# Patient Record
Sex: FEMALE | Race: WHITE | NOT HISPANIC OR LATINO | Employment: OTHER | URBAN - METROPOLITAN AREA
[De-identification: names, ages, dates, MRNs, and addresses within clinical notes are randomized per-mention and may not be internally consistent; named-entity substitution may affect disease eponyms.]

---

## 2022-03-04 ENCOUNTER — HOSPITAL ENCOUNTER (EMERGENCY)
Facility: MEDICAL CENTER | Age: 67
End: 2022-03-04
Attending: EMERGENCY MEDICINE
Payer: MEDICARE

## 2022-03-04 VITALS
HEIGHT: 66 IN | OXYGEN SATURATION: 93 % | HEART RATE: 88 BPM | SYSTOLIC BLOOD PRESSURE: 136 MMHG | DIASTOLIC BLOOD PRESSURE: 64 MMHG | TEMPERATURE: 97 F | WEIGHT: 267 LBS | BODY MASS INDEX: 42.91 KG/M2 | RESPIRATION RATE: 19 BRPM

## 2022-03-04 DIAGNOSIS — K31.84 GASTROPARESIS: ICD-10-CM

## 2022-03-04 DIAGNOSIS — E86.0 DEHYDRATION: ICD-10-CM

## 2022-03-04 LAB
ALBUMIN SERPL BCP-MCNC: 3.9 G/DL (ref 3.2–4.9)
ALBUMIN/GLOB SERPL: 1.1 G/DL
ALP SERPL-CCNC: 81 U/L (ref 30–99)
ALT SERPL-CCNC: 20 U/L (ref 2–50)
ANION GAP SERPL CALC-SCNC: 14 MMOL/L (ref 7–16)
AST SERPL-CCNC: 16 U/L (ref 12–45)
BASOPHILS # BLD AUTO: 0.4 % (ref 0–1.8)
BASOPHILS # BLD: 0.03 K/UL (ref 0–0.12)
BILIRUB SERPL-MCNC: 0.7 MG/DL (ref 0.1–1.5)
BUN SERPL-MCNC: 29 MG/DL (ref 8–22)
CALCIUM SERPL-MCNC: 9.3 MG/DL (ref 8.5–10.5)
CHLORIDE SERPL-SCNC: 108 MMOL/L (ref 96–112)
CO2 SERPL-SCNC: 19 MMOL/L (ref 20–33)
CREAT SERPL-MCNC: 1.29 MG/DL (ref 0.5–1.4)
EOSINOPHIL # BLD AUTO: 0.07 K/UL (ref 0–0.51)
EOSINOPHIL NFR BLD: 0.8 % (ref 0–6.9)
ERYTHROCYTE [DISTWIDTH] IN BLOOD BY AUTOMATED COUNT: 47.5 FL (ref 35.9–50)
GLOBULIN SER CALC-MCNC: 3.4 G/DL (ref 1.9–3.5)
GLUCOSE SERPL-MCNC: 201 MG/DL (ref 65–99)
HCT VFR BLD AUTO: 44.9 % (ref 37–47)
HGB BLD-MCNC: 14.4 G/DL (ref 12–16)
IMM GRANULOCYTES # BLD AUTO: 0.02 K/UL (ref 0–0.11)
IMM GRANULOCYTES NFR BLD AUTO: 0.2 % (ref 0–0.9)
LIPASE SERPL-CCNC: 6 U/L (ref 11–82)
LYMPHOCYTES # BLD AUTO: 1.35 K/UL (ref 1–4.8)
LYMPHOCYTES NFR BLD: 15.8 % (ref 22–41)
MCH RBC QN AUTO: 28.7 PG (ref 27–33)
MCHC RBC AUTO-ENTMCNC: 32.1 G/DL (ref 33.6–35)
MCV RBC AUTO: 89.6 FL (ref 81.4–97.8)
MONOCYTES # BLD AUTO: 0.64 K/UL (ref 0–0.85)
MONOCYTES NFR BLD AUTO: 7.5 % (ref 0–13.4)
NEUTROPHILS # BLD AUTO: 6.44 K/UL (ref 2–7.15)
NEUTROPHILS NFR BLD: 75.3 % (ref 44–72)
NRBC # BLD AUTO: 0 K/UL
NRBC BLD-RTO: 0 /100 WBC
PLATELET # BLD AUTO: 344 K/UL (ref 164–446)
PMV BLD AUTO: 10.2 FL (ref 9–12.9)
POTASSIUM SERPL-SCNC: 4.7 MMOL/L (ref 3.6–5.5)
PROT SERPL-MCNC: 7.3 G/DL (ref 6–8.2)
RBC # BLD AUTO: 5.01 M/UL (ref 4.2–5.4)
SODIUM SERPL-SCNC: 141 MMOL/L (ref 135–145)
WBC # BLD AUTO: 8.6 K/UL (ref 4.8–10.8)

## 2022-03-04 PROCEDURE — 700111 HCHG RX REV CODE 636 W/ 250 OVERRIDE (IP): Performed by: EMERGENCY MEDICINE

## 2022-03-04 PROCEDURE — 83690 ASSAY OF LIPASE: CPT

## 2022-03-04 PROCEDURE — 96376 TX/PRO/DX INJ SAME DRUG ADON: CPT

## 2022-03-04 PROCEDURE — 96374 THER/PROPH/DIAG INJ IV PUSH: CPT

## 2022-03-04 PROCEDURE — 80053 COMPREHEN METABOLIC PANEL: CPT

## 2022-03-04 PROCEDURE — 85025 COMPLETE CBC W/AUTO DIFF WBC: CPT

## 2022-03-04 PROCEDURE — 99284 EMERGENCY DEPT VISIT MOD MDM: CPT

## 2022-03-04 PROCEDURE — 36415 COLL VENOUS BLD VENIPUNCTURE: CPT

## 2022-03-04 PROCEDURE — 96375 TX/PRO/DX INJ NEW DRUG ADDON: CPT

## 2022-03-04 RX ORDER — METOCLOPRAMIDE 10 MG/1
10 TABLET ORAL 4 TIMES DAILY PRN
Qty: 30 TABLET | Refills: 3 | Status: ON HOLD | OUTPATIENT
Start: 2022-03-04 | End: 2022-06-18

## 2022-03-04 RX ORDER — DIPHENHYDRAMINE HYDROCHLORIDE 50 MG/ML
25 INJECTION INTRAMUSCULAR; INTRAVENOUS ONCE
Status: COMPLETED | OUTPATIENT
Start: 2022-03-04 | End: 2022-03-04

## 2022-03-04 RX ORDER — HALOPERIDOL 5 MG/ML
5 INJECTION INTRAMUSCULAR ONCE
Status: COMPLETED | OUTPATIENT
Start: 2022-03-04 | End: 2022-03-04

## 2022-03-04 RX ORDER — DIPHENHYDRAMINE HYDROCHLORIDE 50 MG/ML
50 INJECTION INTRAMUSCULAR; INTRAVENOUS ONCE
Status: COMPLETED | OUTPATIENT
Start: 2022-03-04 | End: 2022-03-04

## 2022-03-04 RX ORDER — OMEPRAZOLE 40 MG/1
40 CAPSULE, DELAYED RELEASE ORAL DAILY
Qty: 30 CAPSULE | Refills: 3 | Status: ON HOLD | OUTPATIENT
Start: 2022-03-04 | End: 2022-06-18

## 2022-03-04 RX ADMIN — DIPHENHYDRAMINE HYDROCHLORIDE 50 MG: 50 INJECTION INTRAMUSCULAR; INTRAVENOUS at 14:14

## 2022-03-04 RX ADMIN — DIPHENHYDRAMINE HYDROCHLORIDE 25 MG: 50 INJECTION INTRAMUSCULAR; INTRAVENOUS at 13:18

## 2022-03-04 RX ADMIN — HALOPERIDOL LACTATE 5 MG: 5 INJECTION, SOLUTION INTRAMUSCULAR at 13:19

## 2022-03-04 ASSESSMENT — PAIN DESCRIPTION - DESCRIPTORS: DESCRIPTORS: CRAMPING;SHARP

## 2022-03-04 NOTE — ED TRIAGE NOTES
.  Chief Complaint   Patient presents with   • Abdominal Pain     X 2-3 days upper abdomen    • Nausea   • Diarrhea     Denies blood in stool   pt to triage with s/o. Above c/c .

## 2022-03-04 NOTE — DISCHARGE INSTRUCTIONS
There is a good chance that the marijuana your smoking is contributing to your pain and your vomiting.  This is a syndrome called cannabinoid hyperemesis syndrome.  The only way to be sure that it is not the contributing factor is to abstain completely, meaning do not smoke at all for the next 6 months.  If your symptoms persist despite this then it may be developing gastroparesis.  I have given you follow-up information for our gastroenterologist.  Otherwise please follow-up with your primary care physician

## 2022-03-04 NOTE — ED PROVIDER NOTES
ED Provider Note    CHIEF COMPLAINT  Chief Complaint   Patient presents with   • Abdominal Pain     X 2-3 days upper abdomen    • Nausea   • Diarrhea     Denies blood in stool       HPI  Yaima Willis is a 66 y.o. female who presents with ongoing longstanding epigastric and left upper quadrant abdominal pain.  Patient reports she has had these symptoms for over years and years.  She reports that she takes Pepcid occasionally and smokes marijuana for her symptoms.  She reports they are ongoing.  Patient does not take a antacid daily.  Patient reports that when she vomits it is very loud and painful.  She reports that it is typically dry heaving or minimal fluids.  She denies any hematemesis or bilious emesis.  Patient reports that she has had diarrhea for around the last week.  Patient denies any bloody or black stool.  Patient denies any fevers or chills.  Patient is status post cholecystectomy and appendectomy years ago.  Patient denies any associated chest pain.  She denies any associated shortness of breath.  She denies any association of the pain with exertion.  Patient reports symptoms are identical to prior episodes of this.  She continues to smoke marijuana at least 4 times a day.  Patient has longstanding history of diabetes.  Patient reports that Reglan was actually very helpful for her symptoms but she ran out a while back.    REVIEW OF SYSTEMS  ROS    See HPI for further details. All other systems are negative.     PAST MEDICAL HISTORY   has a past medical history of Diabetes, Hypertension, and Seizure disorder (HCC).    SOCIAL HISTORY  Social History     Tobacco Use   • Smoking status: Current Every Day Smoker   • Smokeless tobacco: Not on file   Substance and Sexual Activity   • Alcohol use: Yes   • Drug use: No   • Sexual activity: Not on file       SURGICAL HISTORY  patient denies any surgical history    CURRENT MEDICATIONS  Home Medications     Reviewed by Pearl Dela Cruz R.N. (Registered Nurse)  "on 03/04/22 at 1118  Med List Status: Partial   Medication Last Dose Status   hydrocodone-acetaminophen (VICODIN) 5-500 MG TABS  Active                ALLERGIES  Allergies   Allergen Reactions   • Other Drug      Pt states she is allergic to \"ALL\" -javad like lidocaine       PHYSICAL EXAM  Vitals:    03/04/22 1112   BP: 110/74   Pulse: 91   Resp: 16   Temp: 36.1 °C (97 °F)   SpO2: 91%       Physical Exam  Constitutional:       Appearance: She is well-developed.   HENT:      Head: Normocephalic and atraumatic.   Eyes:      Conjunctiva/sclera: Conjunctivae normal.   Cardiovascular:      Rate and Rhythm: Normal rate and regular rhythm.   Pulmonary:      Effort: Pulmonary effort is normal.      Breath sounds: Normal breath sounds.   Abdominal:      General: Bowel sounds are normal. There is no distension.      Palpations: Abdomen is soft.      Tenderness: There is abdominal tenderness in the epigastric area and left upper quadrant. There is no rebound.   Musculoskeletal:      Cervical back: Normal range of motion and neck supple.   Skin:     General: Skin is warm and dry.      Findings: No rash.   Neurological:      Mental Status: She is alert and oriented to person, place, and time.   Psychiatric:         Behavior: Behavior normal.           DIAGNOSTIC STUDIES / PROCEDURES      LABS  Results for orders placed or performed during the hospital encounter of 03/04/22   CBC WITH DIFFERENTIAL   Result Value Ref Range    WBC 8.6 4.8 - 10.8 K/uL    RBC 5.01 4.20 - 5.40 M/uL    Hemoglobin 14.4 12.0 - 16.0 g/dL    Hematocrit 44.9 37.0 - 47.0 %    MCV 89.6 81.4 - 97.8 fL    MCH 28.7 27.0 - 33.0 pg    MCHC 32.1 (L) 33.6 - 35.0 g/dL    RDW 47.5 35.9 - 50.0 fL    Platelet Count 344 164 - 446 K/uL    MPV 10.2 9.0 - 12.9 fL    Neutrophils-Polys 75.30 (H) 44.00 - 72.00 %    Lymphocytes 15.80 (L) 22.00 - 41.00 %    Monocytes 7.50 0.00 - 13.40 %    Eosinophils 0.80 0.00 - 6.90 %    Basophils 0.40 0.00 - 1.80 %    Immature Granulocytes " 0.20 0.00 - 0.90 %    Nucleated RBC 0.00 /100 WBC    Neutrophils (Absolute) 6.44 2.00 - 7.15 K/uL    Lymphs (Absolute) 1.35 1.00 - 4.80 K/uL    Monos (Absolute) 0.64 0.00 - 0.85 K/uL    Eos (Absolute) 0.07 0.00 - 0.51 K/uL    Baso (Absolute) 0.03 0.00 - 0.12 K/uL    Immature Granulocytes (abs) 0.02 0.00 - 0.11 K/uL    NRBC (Absolute) 0.00 K/uL   COMP METABOLIC PANEL   Result Value Ref Range    Sodium 141 135 - 145 mmol/L    Potassium 4.7 3.6 - 5.5 mmol/L    Chloride 108 96 - 112 mmol/L    Co2 19 (L) 20 - 33 mmol/L    Anion Gap 14.0 7.0 - 16.0    Glucose 201 (H) 65 - 99 mg/dL    Bun 29 (H) 8 - 22 mg/dL    Creatinine 1.29 0.50 - 1.40 mg/dL    Calcium 9.3 8.5 - 10.5 mg/dL    AST(SGOT) 16 12 - 45 U/L    ALT(SGPT) 20 2 - 50 U/L    Alkaline Phosphatase 81 30 - 99 U/L    Total Bilirubin 0.7 0.1 - 1.5 mg/dL    Albumin 3.9 3.2 - 4.9 g/dL    Total Protein 7.3 6.0 - 8.2 g/dL    Globulin 3.4 1.9 - 3.5 g/dL    A-G Ratio 1.1 g/dL   LIPASE   Result Value Ref Range    Lipase 6 (L) 11 - 82 U/L   ESTIMATED GFR   Result Value Ref Range    GFR If African American 50 (A) >60 mL/min/1.73 m 2    GFR If Non  41 (A) >60 mL/min/1.73 m 2         RADIOLOGY  No orders to display           COURSE & MEDICAL DECISION MAKING  Pertinent Labs & Imaging studies reviewed. (See chart for details)    Patient with symptoms most consistent with gastritis versus pancreatitis versus gastroparesis versus cannabinoid hyperemesis syndrome.  Patient without any cardiopulmonary symptoms.  Patient will have basic labs for further risk stratification.  Given my high suspicion of gastroparesis versus cannabinoid hyperemesis syndrome I have consented patient for Haldol and Benadryl for symptom management.  She reports she has had this in the past and this did help her symptoms significantly.  Patient reports she is very anxious as her  is also here in the emergency department being seen for hernia.  Patient is otherwise well-appearing with  reassuring vitals.  Patient feeling improved following Haldol but she does have some mild akathisia.  Her Benadryl has been repeated which is resolved her symptoms.  Have stressed the importance of her abstaining from marijuana.  I have given her prescription for Reglan.  Patient has been able to eat and drink here in the emergency department.  Patient also prescribed omeprazole.  I have placed a referral for gastroenterology and the ED follow-up order so patient can get a primary care physician for ongoing management.       The patient will return for worsening symptoms and is stable at the time of discharge. The patient verbalizes understanding and will comply.    FINAL IMPRESSION    1. Dehydration    2. Gastroparesis               Electronically signed by: Haroon King M.D., 3/4/2022 1:03 PM

## 2022-06-11 ENCOUNTER — APPOINTMENT (OUTPATIENT)
Dept: CARDIOLOGY | Facility: MEDICAL CENTER | Age: 67
DRG: 637 | End: 2022-06-11
Attending: INTERNAL MEDICINE
Payer: MEDICARE

## 2022-06-11 ENCOUNTER — APPOINTMENT (OUTPATIENT)
Dept: RADIOLOGY | Facility: MEDICAL CENTER | Age: 67
DRG: 637 | End: 2022-06-11
Attending: INTERNAL MEDICINE
Payer: MEDICARE

## 2022-06-11 ENCOUNTER — HOSPITAL ENCOUNTER (INPATIENT)
Facility: MEDICAL CENTER | Age: 67
LOS: 7 days | DRG: 637 | End: 2022-06-18
Attending: INTERNAL MEDICINE | Admitting: INTERNAL MEDICINE
Payer: MEDICARE

## 2022-06-11 DIAGNOSIS — E10.10 DKA, TYPE 1, NOT AT GOAL (HCC): ICD-10-CM

## 2022-06-11 DIAGNOSIS — I10 HYPERTENSION, UNSPECIFIED TYPE: ICD-10-CM

## 2022-06-11 DIAGNOSIS — K29.71 GASTRITIS WITH HEMORRHAGE, UNSPECIFIED CHRONICITY, UNSPECIFIED GASTRITIS TYPE: ICD-10-CM

## 2022-06-11 DIAGNOSIS — I95.9 HYPOTENSION, UNSPECIFIED HYPOTENSION TYPE: ICD-10-CM

## 2022-06-11 DIAGNOSIS — N17.9 ACUTE RENAL FAILURE, UNSPECIFIED ACUTE RENAL FAILURE TYPE (HCC): ICD-10-CM

## 2022-06-11 DIAGNOSIS — R53.1 WEAKNESS: ICD-10-CM

## 2022-06-11 PROBLEM — E11.10 DKA (DIABETIC KETOACIDOSIS) (HCC): Status: ACTIVE | Noted: 2022-06-11

## 2022-06-11 PROBLEM — K92.0 HEMATEMESIS: Status: ACTIVE | Noted: 2022-06-11

## 2022-06-11 PROBLEM — A41.9 SEPSIS (HCC): Status: ACTIVE | Noted: 2022-06-11

## 2022-06-11 PROBLEM — R07.9 CHEST PAIN: Status: ACTIVE | Noted: 2022-06-11

## 2022-06-11 LAB
ABO + RH BLD: NORMAL
ABO GROUP BLD: NORMAL
ALBUMIN SERPL BCP-MCNC: 2.9 G/DL (ref 3.2–4.9)
ALBUMIN/GLOB SERPL: 1 G/DL
ALP SERPL-CCNC: 102 U/L (ref 30–99)
ALT SERPL-CCNC: 8 U/L (ref 2–50)
ANION GAP SERPL CALC-SCNC: 12 MMOL/L (ref 7–16)
ANION GAP SERPL CALC-SCNC: 17 MMOL/L (ref 7–16)
ANION GAP SERPL CALC-SCNC: 9 MMOL/L (ref 7–16)
APPEARANCE UR: CLEAR
AST SERPL-CCNC: 21 U/L (ref 12–45)
BACTERIA #/AREA URNS HPF: ABNORMAL /HPF
BASE EXCESS BLDA CALC-SCNC: -1 MMOL/L (ref -4–3)
BASE EXCESS BLDV CALC-SCNC: -2 MMOL/L
BASOPHILS # BLD AUTO: 0.1 % (ref 0–1.8)
BASOPHILS # BLD AUTO: 0.3 % (ref 0–1.8)
BASOPHILS # BLD: 0.03 K/UL (ref 0–0.12)
BASOPHILS # BLD: 0.09 K/UL (ref 0–0.12)
BILIRUB SERPL-MCNC: 0.3 MG/DL (ref 0.1–1.5)
BILIRUB UR QL STRIP.AUTO: ABNORMAL
BLD GP AB SCN SERPL QL: NORMAL
BODY TEMPERATURE: 96.8 CENTIGRADE
BODY TEMPERATURE: ABNORMAL DEGREES
BUN SERPL-MCNC: 60 MG/DL (ref 8–22)
BUN SERPL-MCNC: 65 MG/DL (ref 8–22)
BUN SERPL-MCNC: 74 MG/DL (ref 8–22)
CALCIUM SERPL-MCNC: 8.1 MG/DL (ref 8.4–10.2)
CALCIUM SERPL-MCNC: 8.1 MG/DL (ref 8.4–10.2)
CALCIUM SERPL-MCNC: 8.2 MG/DL (ref 8.4–10.2)
CHLORIDE SERPL-SCNC: 103 MMOL/L (ref 96–112)
CHLORIDE SERPL-SCNC: 104 MMOL/L (ref 96–112)
CHLORIDE SERPL-SCNC: 99 MMOL/L (ref 96–112)
CHOLEST SERPL-MCNC: 101 MG/DL (ref 100–199)
CO2 BLDA-SCNC: 26 MMOL/L (ref 20–33)
CO2 SERPL-SCNC: 19 MMOL/L (ref 20–33)
CO2 SERPL-SCNC: 22 MMOL/L (ref 20–33)
CO2 SERPL-SCNC: 24 MMOL/L (ref 20–33)
COLOR UR: YELLOW
CREAT SERPL-MCNC: 1.61 MG/DL (ref 0.5–1.4)
CREAT SERPL-MCNC: 1.69 MG/DL (ref 0.5–1.4)
CREAT SERPL-MCNC: 1.99 MG/DL (ref 0.5–1.4)
EKG IMPRESSION: NORMAL
EOSINOPHIL # BLD AUTO: 0 K/UL (ref 0–0.51)
EOSINOPHIL # BLD AUTO: 0.03 K/UL (ref 0–0.51)
EOSINOPHIL NFR BLD: 0 % (ref 0–6.9)
EOSINOPHIL NFR BLD: 0.1 % (ref 0–6.9)
ERYTHROCYTE [DISTWIDTH] IN BLOOD BY AUTOMATED COUNT: 43.8 FL (ref 35.9–50)
ERYTHROCYTE [DISTWIDTH] IN BLOOD BY AUTOMATED COUNT: 46.3 FL (ref 35.9–50)
FLUAV RNA SPEC QL NAA+PROBE: NEGATIVE
FLUBV RNA SPEC QL NAA+PROBE: NEGATIVE
GFR SERPLBLD CREATININE-BSD FMLA CKD-EPI: 27 ML/MIN/1.73 M 2
GFR SERPLBLD CREATININE-BSD FMLA CKD-EPI: 33 ML/MIN/1.73 M 2
GFR SERPLBLD CREATININE-BSD FMLA CKD-EPI: 35 ML/MIN/1.73 M 2
GLOBULIN SER CALC-MCNC: 3 G/DL (ref 1.9–3.5)
GLUCOSE BLD STRIP.AUTO-MCNC: 173 MG/DL (ref 65–99)
GLUCOSE BLD STRIP.AUTO-MCNC: 179 MG/DL (ref 65–99)
GLUCOSE BLD STRIP.AUTO-MCNC: 179 MG/DL (ref 65–99)
GLUCOSE BLD STRIP.AUTO-MCNC: 210 MG/DL (ref 65–99)
GLUCOSE BLD STRIP.AUTO-MCNC: 263 MG/DL (ref 65–99)
GLUCOSE BLD STRIP.AUTO-MCNC: 300 MG/DL (ref 65–99)
GLUCOSE BLD STRIP.AUTO-MCNC: 334 MG/DL (ref 65–99)
GLUCOSE BLD STRIP.AUTO-MCNC: 375 MG/DL (ref 65–99)
GLUCOSE BLD STRIP.AUTO-MCNC: 380 MG/DL (ref 65–99)
GLUCOSE BLD STRIP.AUTO-MCNC: 384 MG/DL (ref 65–99)
GLUCOSE SERPL-MCNC: 195 MG/DL (ref 65–99)
GLUCOSE SERPL-MCNC: 243 MG/DL (ref 65–99)
GLUCOSE SERPL-MCNC: 410 MG/DL (ref 65–99)
GLUCOSE UR STRIP.AUTO-MCNC: >=1000 MG/DL
HCO3 BLDA-SCNC: 25.1 MMOL/L (ref 17–25)
HCO3 BLDV-SCNC: 23 MMOL/L (ref 24–28)
HCT VFR BLD AUTO: 28.9 % (ref 37–47)
HCT VFR BLD AUTO: 31.3 % (ref 37–47)
HCT VFR BLD AUTO: 32.8 % (ref 37–47)
HDLC SERPL-MCNC: 31 MG/DL
HGB BLD-MCNC: 10.4 G/DL (ref 12–16)
HGB BLD-MCNC: 11.2 G/DL (ref 12–16)
HGB BLD-MCNC: 9.6 G/DL (ref 12–16)
HYALINE CASTS #/AREA URNS LPF: ABNORMAL /LPF
IMM GRANULOCYTES # BLD AUTO: 0.23 K/UL (ref 0–0.11)
IMM GRANULOCYTES # BLD AUTO: 0.4 K/UL (ref 0–0.11)
IMM GRANULOCYTES NFR BLD AUTO: 1 % (ref 0–0.9)
IMM GRANULOCYTES NFR BLD AUTO: 1.5 % (ref 0–0.9)
KETONES UR STRIP.AUTO-MCNC: 15 MG/DL
LACTATE BLD-SCNC: 1.1 MMOL/L (ref 0.5–2)
LACTATE BLD-SCNC: 2.4 MMOL/L (ref 0.5–2)
LDLC SERPL CALC-MCNC: 47 MG/DL
LEUKOCYTE ESTERASE UR QL STRIP.AUTO: NEGATIVE
LIPASE SERPL-CCNC: 8 U/L (ref 7–58)
LV EJECT FRACT  99904: 70
LV EJECT FRACT MOD 2C 99903: 64.46
LV EJECT FRACT MOD 4C 99902: 73.24
LV EJECT FRACT MOD BP 99901: 67.5
LYMPHOCYTES # BLD AUTO: 1.64 K/UL (ref 1–4.8)
LYMPHOCYTES # BLD AUTO: 2.3 K/UL (ref 1–4.8)
LYMPHOCYTES NFR BLD: 10.3 % (ref 22–41)
LYMPHOCYTES NFR BLD: 6.1 % (ref 22–41)
MAGNESIUM SERPL-MCNC: 2 MG/DL (ref 1.5–2.5)
MAGNESIUM SERPL-MCNC: 2.1 MG/DL (ref 1.5–2.5)
MCH RBC QN AUTO: 27.2 PG (ref 27–33)
MCH RBC QN AUTO: 27.4 PG (ref 27–33)
MCHC RBC AUTO-ENTMCNC: 33.2 G/DL (ref 33.6–35)
MCHC RBC AUTO-ENTMCNC: 34.1 G/DL (ref 33.6–35)
MCV RBC AUTO: 79.6 FL (ref 81.4–97.8)
MCV RBC AUTO: 82.4 FL (ref 81.4–97.8)
MICRO URNS: ABNORMAL
MONOCYTES # BLD AUTO: 1.2 K/UL (ref 0–0.85)
MONOCYTES # BLD AUTO: 1.54 K/UL (ref 0–0.85)
MONOCYTES NFR BLD AUTO: 5.4 % (ref 0–13.4)
MONOCYTES NFR BLD AUTO: 5.7 % (ref 0–13.4)
MUCOUS THREADS #/AREA URNS HPF: ABNORMAL /HPF
NEUTROPHILS # BLD AUTO: 18.48 K/UL (ref 2–7.15)
NEUTROPHILS # BLD AUTO: 23.18 K/UL (ref 2–7.15)
NEUTROPHILS NFR BLD: 83.1 % (ref 44–72)
NEUTROPHILS NFR BLD: 86.4 % (ref 44–72)
NITRITE UR QL STRIP.AUTO: POSITIVE
NRBC # BLD AUTO: 0 K/UL
NRBC # BLD AUTO: 0 K/UL
NRBC BLD-RTO: 0 /100 WBC
NRBC BLD-RTO: 0 /100 WBC
PCO2 BLDA: 46.6 MMHG (ref 26–37)
PCO2 BLDV: 41.3 MMHG (ref 41–51)
PH BLDA: 7.34 [PH] (ref 7.4–7.5)
PH BLDV: 7.37 [PH] (ref 7.31–7.45)
PH TEMP ADJ BLDA: 7.34 [PH] (ref 7.4–7.5)
PH UR STRIP.AUTO: 5.5 [PH] (ref 5–8)
PHOSPHATE SERPL-MCNC: 2.7 MG/DL (ref 2.5–4.5)
PHOSPHATE SERPL-MCNC: 3.2 MG/DL (ref 2.5–4.5)
PLATELET # BLD AUTO: 250 K/UL (ref 164–446)
PLATELET # BLD AUTO: 275 K/UL (ref 164–446)
PMV BLD AUTO: 11.7 FL (ref 9–12.9)
PMV BLD AUTO: 12 FL (ref 9–12.9)
PO2 BLDA: 65 MMHG (ref 64–87)
PO2 BLDV: 54.8 MMHG (ref 25–40)
PO2 TEMP ADJ BLDA: 65 MMHG (ref 64–87)
POTASSIUM SERPL-SCNC: 3.4 MMOL/L (ref 3.6–5.5)
POTASSIUM SERPL-SCNC: 3.5 MMOL/L (ref 3.6–5.5)
POTASSIUM SERPL-SCNC: 4 MMOL/L (ref 3.6–5.5)
PROCALCITONIN SERPL-MCNC: 1.17 NG/ML
PROT SERPL-MCNC: 5.9 G/DL (ref 6–8.2)
PROT UR QL STRIP: NEGATIVE MG/DL
RBC # BLD AUTO: 3.8 M/UL (ref 4.2–5.4)
RBC # BLD AUTO: 4.12 M/UL (ref 4.2–5.4)
RBC # URNS HPF: ABNORMAL /HPF
RBC UR QL AUTO: ABNORMAL
RH BLD: NORMAL
RSV RNA SPEC QL NAA+PROBE: NEGATIVE
SAO2 % BLDA: 91 % (ref 93–99)
SAO2 % BLDV: 86.8 %
SARS-COV-2 RNA RESP QL NAA+PROBE: NOTDETECTED
SODIUM SERPL-SCNC: 135 MMOL/L (ref 135–145)
SODIUM SERPL-SCNC: 137 MMOL/L (ref 135–145)
SODIUM SERPL-SCNC: 137 MMOL/L (ref 135–145)
SP GR UR STRIP.AUTO: 1.01
SPECIMEN DRAWN FROM PATIENT: ABNORMAL
SPECIMEN SOURCE: 3
TRIGL SERPL-MCNC: 115 MG/DL (ref 0–149)
TROPONIN T SERPL-MCNC: 424 NG/L (ref 6–19)
TROPONIN T SERPL-MCNC: 724 NG/L (ref 6–19)
UNIDENT CRYS URNS QL MICRO: ABNORMAL /HPF
WBC # BLD AUTO: 22.3 K/UL (ref 4.8–10.8)
WBC # BLD AUTO: 26.9 K/UL (ref 4.8–10.8)
WBC #/AREA URNS HPF: ABNORMAL /HPF

## 2022-06-11 PROCEDURE — 82962 GLUCOSE BLOOD TEST: CPT | Mod: 91

## 2022-06-11 PROCEDURE — 70450 CT HEAD/BRAIN W/O DYE: CPT | Mod: MG

## 2022-06-11 PROCEDURE — C9803 HOPD COVID-19 SPEC COLLECT: HCPCS | Performed by: INTERNAL MEDICINE

## 2022-06-11 PROCEDURE — 71045 X-RAY EXAM CHEST 1 VIEW: CPT

## 2022-06-11 PROCEDURE — 86900 BLOOD TYPING SEROLOGIC ABO: CPT

## 2022-06-11 PROCEDURE — C1751 CATH, INF, PER/CENT/MIDLINE: HCPCS

## 2022-06-11 PROCEDURE — 85014 HEMATOCRIT: CPT

## 2022-06-11 PROCEDURE — 93005 ELECTROCARDIOGRAM TRACING: CPT | Performed by: INTERNAL MEDICINE

## 2022-06-11 PROCEDURE — 700102 HCHG RX REV CODE 250 W/ 637 OVERRIDE(OP): Performed by: INTERNAL MEDICINE

## 2022-06-11 PROCEDURE — 80061 LIPID PANEL: CPT

## 2022-06-11 PROCEDURE — 86850 RBC ANTIBODY SCREEN: CPT

## 2022-06-11 PROCEDURE — 83036 HEMOGLOBIN GLYCOSYLATED A1C: CPT

## 2022-06-11 PROCEDURE — 84100 ASSAY OF PHOSPHORUS: CPT | Mod: 91

## 2022-06-11 PROCEDURE — A9270 NON-COVERED ITEM OR SERVICE: HCPCS | Performed by: INTERNAL MEDICINE

## 2022-06-11 PROCEDURE — 93010 ELECTROCARDIOGRAM REPORT: CPT | Performed by: INTERNAL MEDICINE

## 2022-06-11 PROCEDURE — 770022 HCHG ROOM/CARE - ICU (200)

## 2022-06-11 PROCEDURE — 700111 HCHG RX REV CODE 636 W/ 250 OVERRIDE (IP): Performed by: INTERNAL MEDICINE

## 2022-06-11 PROCEDURE — 36556 INSERT NON-TUNNEL CV CATH: CPT

## 2022-06-11 PROCEDURE — 36556 INSERT NON-TUNNEL CV CATH: CPT | Mod: RT | Performed by: INTERNAL MEDICINE

## 2022-06-11 PROCEDURE — 85025 COMPLETE CBC W/AUTO DIFF WBC: CPT | Mod: 91

## 2022-06-11 PROCEDURE — 84145 PROCALCITONIN (PCT): CPT

## 2022-06-11 PROCEDURE — 82272 OCCULT BLD FECES 1-3 TESTS: CPT

## 2022-06-11 PROCEDURE — 700105 HCHG RX REV CODE 258: Performed by: INTERNAL MEDICINE

## 2022-06-11 PROCEDURE — 99291 CRITICAL CARE FIRST HOUR: CPT | Mod: 25 | Performed by: INTERNAL MEDICINE

## 2022-06-11 PROCEDURE — 80053 COMPREHEN METABOLIC PANEL: CPT

## 2022-06-11 PROCEDURE — 86901 BLOOD TYPING SEROLOGIC RH(D): CPT

## 2022-06-11 PROCEDURE — 83735 ASSAY OF MAGNESIUM: CPT | Mod: 91

## 2022-06-11 PROCEDURE — 93306 TTE W/DOPPLER COMPLETE: CPT

## 2022-06-11 PROCEDURE — 36600 WITHDRAWAL OF ARTERIAL BLOOD: CPT

## 2022-06-11 PROCEDURE — 87040 BLOOD CULTURE FOR BACTERIA: CPT | Mod: 91

## 2022-06-11 PROCEDURE — 81001 URINALYSIS AUTO W/SCOPE: CPT

## 2022-06-11 PROCEDURE — 93306 TTE W/DOPPLER COMPLETE: CPT | Mod: 26 | Performed by: INTERNAL MEDICINE

## 2022-06-11 PROCEDURE — 94760 N-INVAS EAR/PLS OXIMETRY 1: CPT

## 2022-06-11 PROCEDURE — 80048 BASIC METABOLIC PNL TOTAL CA: CPT | Mod: 91

## 2022-06-11 PROCEDURE — 83690 ASSAY OF LIPASE: CPT

## 2022-06-11 PROCEDURE — 02HV33Z INSERTION OF INFUSION DEVICE INTO SUPERIOR VENA CAVA, PERCUTANEOUS APPROACH: ICD-10-PCS | Performed by: INTERNAL MEDICINE

## 2022-06-11 PROCEDURE — 82803 BLOOD GASES ANY COMBINATION: CPT

## 2022-06-11 PROCEDURE — 0241U HCHG SARS-COV-2 COVID-19 NFCT DS RESP RNA 4 TRGT MIC: CPT

## 2022-06-11 PROCEDURE — C9113 INJ PANTOPRAZOLE SODIUM, VIA: HCPCS | Performed by: INTERNAL MEDICINE

## 2022-06-11 PROCEDURE — 85018 HEMOGLOBIN: CPT

## 2022-06-11 PROCEDURE — 83605 ASSAY OF LACTIC ACID: CPT

## 2022-06-11 PROCEDURE — 84484 ASSAY OF TROPONIN QUANT: CPT | Mod: 91

## 2022-06-11 RX ORDER — POTASSIUM CHLORIDE 7.45 MG/ML
10 INJECTION INTRAVENOUS
Status: COMPLETED | OUTPATIENT
Start: 2022-06-11 | End: 2022-06-11

## 2022-06-11 RX ORDER — SODIUM CHLORIDE, SODIUM LACTATE, POTASSIUM CHLORIDE, CALCIUM CHLORIDE 600; 310; 30; 20 MG/100ML; MG/100ML; MG/100ML; MG/100ML
INJECTION, SOLUTION INTRAVENOUS CONTINUOUS
Status: DISCONTINUED | OUTPATIENT
Start: 2022-06-11 | End: 2022-06-12

## 2022-06-11 RX ORDER — LORAZEPAM 2 MG/ML
0.5 INJECTION INTRAMUSCULAR
Status: DISCONTINUED | OUTPATIENT
Start: 2022-06-11 | End: 2022-06-12

## 2022-06-11 RX ORDER — PANTOPRAZOLE SODIUM 40 MG/10ML
40 INJECTION, POWDER, LYOPHILIZED, FOR SOLUTION INTRAVENOUS 2 TIMES DAILY
Status: DISCONTINUED | OUTPATIENT
Start: 2022-06-11 | End: 2022-06-18 | Stop reason: HOSPADM

## 2022-06-11 RX ORDER — DEXTROSE AND SODIUM CHLORIDE 10; .45 G/100ML; G/100ML
INJECTION, SOLUTION INTRAVENOUS CONTINUOUS
Status: DISCONTINUED | OUTPATIENT
Start: 2022-06-11 | End: 2022-06-12

## 2022-06-11 RX ORDER — BISACODYL 10 MG
10 SUPPOSITORY, RECTAL RECTAL
Status: DISCONTINUED | OUTPATIENT
Start: 2022-06-11 | End: 2022-06-12

## 2022-06-11 RX ORDER — ACETAMINOPHEN 325 MG/1
650 TABLET ORAL EVERY 6 HOURS PRN
Status: DISCONTINUED | OUTPATIENT
Start: 2022-06-11 | End: 2022-06-12

## 2022-06-11 RX ORDER — SODIUM CHLORIDE 9 MG/ML
2000 INJECTION, SOLUTION INTRAVENOUS ONCE
Status: DISCONTINUED | OUTPATIENT
Start: 2022-06-11 | End: 2022-06-11

## 2022-06-11 RX ORDER — HYDRALAZINE HYDROCHLORIDE 20 MG/ML
10 INJECTION INTRAMUSCULAR; INTRAVENOUS EVERY 4 HOURS PRN
Status: DISCONTINUED | OUTPATIENT
Start: 2022-06-11 | End: 2022-06-12

## 2022-06-11 RX ORDER — AZITHROMYCIN 250 MG/1
500 TABLET, FILM COATED ORAL DAILY
Status: DISCONTINUED | OUTPATIENT
Start: 2022-06-11 | End: 2022-06-11

## 2022-06-11 RX ORDER — SODIUM CHLORIDE 9 MG/ML
1000 INJECTION, SOLUTION INTRAVENOUS ONCE
Status: COMPLETED | OUTPATIENT
Start: 2022-06-11 | End: 2022-06-11

## 2022-06-11 RX ORDER — NOREPINEPHRINE BITARTRATE 0.03 MG/ML
INJECTION, SOLUTION INTRAVENOUS
Status: ACTIVE
Start: 2022-06-11 | End: 2022-06-12

## 2022-06-11 RX ORDER — MAGNESIUM SULFATE HEPTAHYDRATE 40 MG/ML
4 INJECTION, SOLUTION INTRAVENOUS
Status: DISCONTINUED | OUTPATIENT
Start: 2022-06-11 | End: 2022-06-12

## 2022-06-11 RX ORDER — ONDANSETRON 2 MG/ML
4 INJECTION INTRAMUSCULAR; INTRAVENOUS EVERY 4 HOURS PRN
Status: DISCONTINUED | OUTPATIENT
Start: 2022-06-11 | End: 2022-06-17

## 2022-06-11 RX ORDER — HEPARIN SODIUM 5000 [USP'U]/ML
5000 INJECTION, SOLUTION INTRAVENOUS; SUBCUTANEOUS EVERY 8 HOURS
Status: DISCONTINUED | OUTPATIENT
Start: 2022-06-11 | End: 2022-06-11

## 2022-06-11 RX ORDER — LORAZEPAM 2 MG/ML
INJECTION INTRAMUSCULAR
Status: ACTIVE
Start: 2022-06-11 | End: 2022-06-12

## 2022-06-11 RX ORDER — ATORVASTATIN CALCIUM 40 MG/1
40 TABLET, FILM COATED ORAL EVERY EVENING
Status: DISCONTINUED | OUTPATIENT
Start: 2022-06-11 | End: 2022-06-12

## 2022-06-11 RX ORDER — POLYETHYLENE GLYCOL 3350 17 G/17G
1 POWDER, FOR SOLUTION ORAL
Status: DISCONTINUED | OUTPATIENT
Start: 2022-06-11 | End: 2022-06-12

## 2022-06-11 RX ORDER — MAGNESIUM SULFATE HEPTAHYDRATE 40 MG/ML
2 INJECTION, SOLUTION INTRAVENOUS
Status: DISCONTINUED | OUTPATIENT
Start: 2022-06-11 | End: 2022-06-12

## 2022-06-11 RX ORDER — NYSTATIN 100000 [USP'U]/G
POWDER TOPICAL 2 TIMES DAILY
Status: COMPLETED | OUTPATIENT
Start: 2022-06-11 | End: 2022-06-13

## 2022-06-11 RX ORDER — AMOXICILLIN 250 MG
2 CAPSULE ORAL 2 TIMES DAILY
Status: DISCONTINUED | OUTPATIENT
Start: 2022-06-11 | End: 2022-06-12

## 2022-06-11 RX ORDER — SODIUM CHLORIDE, SODIUM LACTATE, POTASSIUM CHLORIDE, AND CALCIUM CHLORIDE .6; .31; .03; .02 G/100ML; G/100ML; G/100ML; G/100ML
2000 INJECTION, SOLUTION INTRAVENOUS ONCE
Status: COMPLETED | OUTPATIENT
Start: 2022-06-11 | End: 2022-06-11

## 2022-06-11 RX ORDER — DEXTROSE, SODIUM CHLORIDE, SODIUM LACTATE, POTASSIUM CHLORIDE, AND CALCIUM CHLORIDE 5; .6; .31; .03; .02 G/100ML; G/100ML; G/100ML; G/100ML; G/100ML
INJECTION, SOLUTION INTRAVENOUS CONTINUOUS
Status: DISCONTINUED | OUTPATIENT
Start: 2022-06-11 | End: 2022-06-12

## 2022-06-11 RX ORDER — ONDANSETRON 4 MG/1
4 TABLET, ORALLY DISINTEGRATING ORAL EVERY 4 HOURS PRN
Status: DISCONTINUED | OUTPATIENT
Start: 2022-06-11 | End: 2022-06-17

## 2022-06-11 RX ADMIN — SODIUM CHLORIDE 5 UNITS/HR: 9 INJECTION, SOLUTION INTRAVENOUS at 13:00

## 2022-06-11 RX ADMIN — CEFTRIAXONE SODIUM 2 G: 2 INJECTION, POWDER, FOR SOLUTION INTRAMUSCULAR; INTRAVENOUS at 14:47

## 2022-06-11 RX ADMIN — SODIUM CHLORIDE, SODIUM LACTATE, POTASSIUM CHLORIDE, CALCIUM CHLORIDE AND DEXTROSE MONOHYDRATE: 5; 600; 310; 30; 20 INJECTION, SOLUTION INTRAVENOUS at 18:08

## 2022-06-11 RX ADMIN — POTASSIUM CHLORIDE 10 MEQ: 7.46 INJECTION, SOLUTION INTRAVENOUS at 20:01

## 2022-06-11 RX ADMIN — NYSTATIN: 100000 POWDER TOPICAL at 18:39

## 2022-06-11 RX ADMIN — PANTOPRAZOLE SODIUM 40 MG: 40 INJECTION, POWDER, FOR SOLUTION INTRAVENOUS at 18:39

## 2022-06-11 RX ADMIN — POTASSIUM CHLORIDE 10 MEQ: 7.46 INJECTION, SOLUTION INTRAVENOUS at 15:48

## 2022-06-11 RX ADMIN — POTASSIUM CHLORIDE 10 MEQ: 7.46 INJECTION, SOLUTION INTRAVENOUS at 21:20

## 2022-06-11 RX ADMIN — SODIUM CHLORIDE 1000 ML: 9 INJECTION, SOLUTION INTRAVENOUS at 22:27

## 2022-06-11 RX ADMIN — AZITHROMYCIN MONOHYDRATE 500 MG: 500 INJECTION, POWDER, LYOPHILIZED, FOR SOLUTION INTRAVENOUS at 16:06

## 2022-06-11 RX ADMIN — INSULIN GLARGINE-YFGN 15 UNITS: 100 INJECTION, SOLUTION SUBCUTANEOUS at 21:15

## 2022-06-11 RX ADMIN — SODIUM CHLORIDE, POTASSIUM CHLORIDE, SODIUM LACTATE AND CALCIUM CHLORIDE: 600; 310; 30; 20 INJECTION, SOLUTION INTRAVENOUS at 13:01

## 2022-06-11 RX ADMIN — SODIUM CHLORIDE, POTASSIUM CHLORIDE, SODIUM LACTATE AND CALCIUM CHLORIDE 2000 ML: 600; 310; 30; 20 INJECTION, SOLUTION INTRAVENOUS at 12:34

## 2022-06-11 RX ADMIN — POTASSIUM CHLORIDE 10 MEQ: 7.46 INJECTION, SOLUTION INTRAVENOUS at 13:51

## 2022-06-11 ASSESSMENT — ENCOUNTER SYMPTOMS
ABDOMINAL PAIN: 1
NECK PAIN: 1
CLAUDICATION: 0
MYALGIAS: 1
FEVER: 0
HEMOPTYSIS: 0
SHORTNESS OF BREATH: 1
SPUTUM PRODUCTION: 0
ORTHOPNEA: 0
CONSTIPATION: 0
VOMITING: 1
PALPITATIONS: 0
WEIGHT LOSS: 0
DIARRHEA: 1
BACK PAIN: 1
CHILLS: 1
COUGH: 0
NAUSEA: 1

## 2022-06-11 ASSESSMENT — PAIN DESCRIPTION - PAIN TYPE
TYPE: ACUTE PAIN

## 2022-06-11 ASSESSMENT — COGNITIVE AND FUNCTIONAL STATUS - GENERAL
SUGGESTED CMS G CODE MODIFIER DAILY ACTIVITY: CL
CLIMB 3 TO 5 STEPS WITH RAILING: A LOT
DAILY ACTIVITIY SCORE: 12
PERSONAL GROOMING: A LOT
MOBILITY SCORE: 12
EATING MEALS: A LOT
MOVING FROM LYING ON BACK TO SITTING ON SIDE OF FLAT BED: A LOT
TOILETING: A LOT
WALKING IN HOSPITAL ROOM: A LOT
STANDING UP FROM CHAIR USING ARMS: A LOT
MOVING TO AND FROM BED TO CHAIR: A LOT
TURNING FROM BACK TO SIDE WHILE IN FLAT BAD: A LOT
HELP NEEDED FOR BATHING: A LOT
SUGGESTED CMS G CODE MODIFIER MOBILITY: CL
DRESSING REGULAR UPPER BODY CLOTHING: A LOT
DRESSING REGULAR LOWER BODY CLOTHING: A LOT

## 2022-06-11 ASSESSMENT — LIFESTYLE VARIABLES
HOW MANY TIMES IN THE PAST YEAR HAVE YOU HAD 5 OR MORE DRINKS IN A DAY: 0
TOTAL SCORE: 0
HAVE YOU EVER FELT YOU SHOULD CUT DOWN ON YOUR DRINKING: NO
ALCOHOL_USE: NO
TOTAL SCORE: 0
TOTAL SCORE: 0
AVERAGE NUMBER OF DAYS PER WEEK YOU HAVE A DRINK CONTAINING ALCOHOL: 0
HAVE PEOPLE ANNOYED YOU BY CRITICIZING YOUR DRINKING: NO
EVER FELT BAD OR GUILTY ABOUT YOUR DRINKING: NO
EVER HAD A DRINK FIRST THING IN THE MORNING TO STEADY YOUR NERVES TO GET RID OF A HANGOVER: NO
ON A TYPICAL DAY WHEN YOU DRINK ALCOHOL HOW MANY DRINKS DO YOU HAVE: 0
CONSUMPTION TOTAL: NEGATIVE

## 2022-06-11 ASSESSMENT — PATIENT HEALTH QUESTIONNAIRE - PHQ9
SUM OF ALL RESPONSES TO PHQ9 QUESTIONS 1 AND 2: 0
1. LITTLE INTEREST OR PLEASURE IN DOING THINGS: NOT AT ALL
2. FEELING DOWN, DEPRESSED, IRRITABLE, OR HOPELESS: NOT AT ALL

## 2022-06-11 ASSESSMENT — FIBROSIS 4 INDEX: FIB4 SCORE: 0.69

## 2022-06-11 NOTE — PROGRESS NOTES
Patient brought up by transport on [facility]. Patient confused and A/O to self. Patient able to follow some commands. Unable to complete admission profile at this time. Explained plan of care. Patient did not have any questions. Bed alarm on and call light within reach.

## 2022-06-11 NOTE — PROGRESS NOTES
Megan from Lab called with critical result of Troponin at 424. Critical lab result read back to Megan.   Dr. Nicole notified of critical lab result at 1229.  Critical lab result read back by Dr. Nicole.

## 2022-06-11 NOTE — H&P
Pulmonary History & Physical Note    Date of Service  6/11/2022    Primary Care Physician  Pcp Pt States None    Consultants  None     Code Status  Full Code    Chief Complaint  DKA     History of Presenting Illness  Yaima Willis is a 67 y.o. female who was transferred from [facility] ED on 6/11/2022 for DKA management. Patient with significant history of DM, morbid obesity, and hypertension who presents to [facility] ED with complaints of feeling ill for the past week. Associated with generalized pain, chest pain, diarrhea, SOB, and chills. EKG showed A fib w/ HR ~127 and ST depression in lead I and avL. Labs from OSH notable for WBC 24.8, glucose 939, BUN 76, Cr 2.4, Na 127, K 4.7, Cl 88, Co2 7, and AG 32. She was resuscitated with three liters crystalloid and started on insulin infusion ~10 units/hour. While in the ER awaiting for transfer, RN reported patient had an episode of hematemesis. Transferred to [facility] for further management.    Upon arrival to [facility], patient is confuse but able to answer simple questions. Endorse having chest pain, diffuse body aches, chills and nausea. Ordered labs, 1 liter LR bolus, and started on insulin infusion 0.05 units//kg/hr.        I discussed the plan of care with patient and bedside RN.    Review of Systems  Review of Systems   Constitutional: Positive for chills and malaise/fatigue. Negative for fever and weight loss.   Respiratory: Positive for shortness of breath. Negative for cough, hemoptysis and sputum production.    Cardiovascular: Positive for chest pain. Negative for palpitations, orthopnea and claudication.   Gastrointestinal: Positive for abdominal pain, diarrhea, nausea and vomiting. Negative for constipation.   Genitourinary: Negative for dysuria, frequency and urgency.   Musculoskeletal: Positive for back pain, joint pain, myalgias and neck pain.   All other systems reviewed and are negative.      Past Medical History   has a past medical history of Diabetes,  "Hypertension, and Seizure disorder (HCC).    Surgical History   has no past surgical history on file.     Family History  family history is not on file.   Family history reviewed with patient. There is no family history that is pertinent to the chief complaint.     Social History   reports that she has been smoking. She does not have any smokeless tobacco history on file. She reports current alcohol use. She reports that she does not use drugs.    Allergies  Allergies   Allergen Reactions   • Other Drug      Pt states she is allergic to \"ALL\" -javad like lidocaine       Medications  Prior to Admission Medications   Prescriptions Last Dose Informant Patient Reported? Taking?   hydrocodone-acetaminophen (VICODIN) 5-500 MG TABS   No No   Sig: Take 1-2 Tabs by mouth every four hours as needed for 20 doses.   metoclopramide (REGLAN) 10 MG Tab   No No   Sig: Take 1 Tablet by mouth 4 times a day as needed.   omeprazole (PRILOSEC) 40 MG delayed-release capsule   No No   Sig: Take 1 Capsule by mouth every day.      Facility-Administered Medications: None       Physical Exam  Temp:  [36.6 °C (97.9 °F)] 36.6 °C (97.9 °F)  Pulse:  [83] 83  Resp:  [23] 23  BP: (107)/(57) 107/57  SpO2:  [94 %] 94 %  Blood Pressure : 107/57   Temperature: 36.6 °C (97.9 °F)   Pulse: 83   Respiration: (!) 23   Pulse Oximetry: 94 %       Physical Exam  Constitutional:       Appearance: She is obese. She is ill-appearing.      Comments: Awake and confuse      HENT:      Head: Normocephalic.      Nose: Nose normal.      Mouth/Throat:      Mouth: Mucous membranes are dry.   Eyes:      Pupils: Pupils are equal, round, and reactive to light.   Cardiovascular:      Rate and Rhythm: Normal rate and regular rhythm.      Pulses: Normal pulses.      Heart sounds: No murmur heard.     Comments: Tender to palpation      Pulmonary:      Effort: Pulmonary effort is normal.      Comments: Decrease BS at the base     Abdominal:      Comments: Obese, NT to " palpation, hypoactive bowel sounds      Musculoskeletal:         General: Normal range of motion.      Cervical back: Normal range of motion.      Right lower leg: No edema.   Skin:     General: Skin is dry.      Comments: Poor skin turgor   Neurological:      Comments: Oriented to self but confuse to situation; moving all four extremities            Laboratory:          No results for input(s): ALTSGPT, ASTSGOT, ALKPHOSPHAT, TBILIRUBIN, DBILIRUBIN, GAMMAGT, AMYLASE, LIPASE, ALB, PREALBUMIN, GLUCOSE in the last 72 hours.      No results for input(s): NTPROBNP in the last 72 hours.      No results for input(s): TROPONINT in the last 72 hours.    Imaging:  OUTSIDE IMAGES-DX CHEST   Final Result      DX-CHEST-PORTABLE (1 VIEW)    (Results Pending)       EKG:  My impression is: SR with no evidence of ST T changes     Assessment/Plan:  Justification for Admission Status  I anticipate this patient will require at least two midnights for appropriate medical management, necessitating inpatient admission because DKA    Hematemesis  Assessment & Plan  -- Possible related to intractable N/V causing nasrin dorman tear. Other ddx includes AVM vs PUD.   -- Start protonix 40 IV BID   -- NPO   -- Trend H/H every 6 hours   -- Type and screen   -- Goal Hb > 7      Sepsis (HCC)  Assessment & Plan  This is Severe Sepsis Present on admission  SIRS criteria identified on my evaluation include: Tachycardia, with heart rate greater than 90 BPM, Tachypnea, with respirations greater than 20 per minute and Leukocytosis, with WBC greater than 12,000  Clinical indicators of end organ dysfunction include Creatinine >2.0 (Without ESRD or CKD)  Source is PNA vs UTI   Sepsis protocol initiated  Crystalloid Fluid Administration: Fluid resuscitation ordered per standard protocol - 30 mL/kg per current or ideal body weight  IV antibiotics as appropriate for source of sepsis  Reassessment: I have reassessed the patient's hemodynamic status    Cont  aggressive IVF resuscitation   Start CAP coverage   Check COVID and flu    Check urinary legionella/strep   Check blood and resp cx   Follow up cx data       Chest pain  Assessment & Plan  -- Unclear etiology. Differentials include ACS vs musculoskeletal vs pleuritis.   -- Sepsis workup   -- EKG showed SR w/ no acute ST T changes   -- Trend troponin X 3   -- Start lipitor; holding ASA due to hematemesis    -- Check TTE   -- Monitor on telemetry       Hypertension  Assessment & Plan  -- Not on any home BP meds    -- Goal SBP < 140      Acute renal failure (ARF) ([facility])  Assessment & Plan  -- Secondary to dehydration and severe DKA   -- Cont aggressive crystalloid resuscitation   -- Avoid nephrotoxin agents   -- Daily BMP   -- Monitor UOP   -- Meds dose to GFR       DKA (diabetic ketoacidosis) ([facility])  Assessment & Plan  -- Possible related to sepsis vs NSTEMI vs noncompliance   -- Sepsis workup as below   -- Check troponin and lipase   -- Cont aggressive IVF resuscitation   -- DKA protocol with insulin drip at 0.05 units/kg/hr  -- Every hour Accu-Cheks, every 4 hour BMP, mag, phos  -- Goal Magnesium: >2, Phosphorus: 2, K >4  -- Will transition to sliding scale insulin when anion gap <12, CO2 > 17     Called and spoke to patient's daughter Krystyna. Updated her about patient's critically ill condition with severe DKA complicated with acute encephalopathy and TAO. Confirmed code status to be DNR/DNI but okay for vasopressor, IV fluids, and abx.     The patient remains critically ill.  Critical care time = 49 minutes in directly providing and coordinating critical care and extensive data review.  No time overlap and excludes procedures.      VTE prophylaxis: SCDs/TEDs

## 2022-06-11 NOTE — ASSESSMENT & PLAN NOTE
-- Secondary to dehydration and severe DKA   -- Improving slowly    -- Avoid nephrotoxin agents   -- Daily BMP   -- Monitor UOP   -- Meds dose to GFR

## 2022-06-11 NOTE — ASSESSMENT & PLAN NOTE
This is Severe Sepsis Present on admission  SIRS criteria identified on my evaluation include: Tachycardia, with heart rate greater than 90 BPM, Tachypnea, with respirations greater than 20 per minute and Leukocytosis, with WBC greater than 12,000  Clinical indicators of end organ dysfunction include Creatinine >2.0 (Without ESRD or CKD)  Source is PNA vs UTI   Sepsis protocol initiated  Crystalloid Fluid Administration: Fluid resuscitation ordered per standard protocol - 30 mL/kg per current or ideal body weight  IV antibiotics as appropriate for source of sepsis  Reassessment: I have reassessed the patient's hemodynamic status    On CAP coverage   Follow up urinary legionella/strep   Cx negative to date   Follow up cx data

## 2022-06-11 NOTE — ASSESSMENT & PLAN NOTE
-- Not on any home BP meds    -- Added hydralazine 5 mg IV q4 hr prn SBP > 180  -- Goal SBP < 140

## 2022-06-11 NOTE — PROGRESS NOTES
4 Eyes Skin Assessment Completed by WILLY Crawford and WILLY Sullivan.    Head WDL  Ears WDL  Nose WDL  Mouth WDL  Neck WDL  Breast/Chest WDL  Shoulder Blades WDL  Spine WDL  (R) Arm/Elbow/Hand WDL  (L) Arm/Elbow/Hand WDL  Abdomen WDL  Groin Excoriation  Scrotum/Coccyx/Buttocks Blanching  (R) Leg WDL  (L) Leg WDL  (R) Heel/Foot/Toe Scab  (L) Heel/Foot/Toe WDL          Devices In Places ECG, Blood Pressure Cuff and Pulse Ox      Interventions In Place Pillows and Low Air Loss Mattress    Possible Skin Injury No    Pictures Uploaded Into Epic N/A  Wound Consult Placed N/A  RN Wound Prevention Protocol Ordered No

## 2022-06-11 NOTE — ASSESSMENT & PLAN NOTE
-- Possible related to intractable N/V causing nasrin dorman tear. Other ddx includes AVM vs PUD.   -- On protonix 40 IV BID   -- H/H stable   -- Trend H/H every 12 hours   -- Goal Hb > 7

## 2022-06-11 NOTE — ASSESSMENT & PLAN NOTE
-- Unclear etiology. Differentials include ACS vs musculoskeletal vs pleuritis.   -- Sepsis workup   -- TTE showed normal EF with no WMA    -- Cont lipitor; holding ASA due to hematemesis    -- Monitor on telemetry

## 2022-06-12 ENCOUNTER — APPOINTMENT (OUTPATIENT)
Dept: RADIOLOGY | Facility: MEDICAL CENTER | Age: 67
DRG: 637 | End: 2022-06-12
Attending: INTERNAL MEDICINE
Payer: MEDICARE

## 2022-06-12 PROBLEM — E11.9 DM (DIABETES MELLITUS) (HCC): Status: ACTIVE | Noted: 2022-06-11

## 2022-06-12 PROBLEM — T17.800A ASPIRATION INTO LOWER RESPIRATORY TRACT: Status: ACTIVE | Noted: 2022-06-12

## 2022-06-12 LAB
ANION GAP SERPL CALC-SCNC: 9 MMOL/L (ref 7–16)
BUN SERPL-MCNC: 55 MG/DL (ref 8–22)
CALCIUM SERPL-MCNC: 8.3 MG/DL (ref 8.4–10.2)
CHLORIDE SERPL-SCNC: 106 MMOL/L (ref 96–112)
CO2 SERPL-SCNC: 23 MMOL/L (ref 20–33)
CREAT SERPL-MCNC: 1.58 MG/DL (ref 0.5–1.4)
EST. AVERAGE GLUCOSE BLD GHB EST-MCNC: 306 MG/DL
GFR SERPLBLD CREATININE-BSD FMLA CKD-EPI: 36 ML/MIN/1.73 M 2
GLUCOSE BLD STRIP.AUTO-MCNC: 212 MG/DL (ref 65–99)
GLUCOSE BLD STRIP.AUTO-MCNC: 237 MG/DL (ref 65–99)
GLUCOSE BLD STRIP.AUTO-MCNC: 296 MG/DL (ref 65–99)
GLUCOSE BLD STRIP.AUTO-MCNC: 322 MG/DL (ref 65–99)
GLUCOSE BLD STRIP.AUTO-MCNC: 347 MG/DL (ref 65–99)
GLUCOSE SERPL-MCNC: 237 MG/DL (ref 65–99)
HBA1C MFR BLD: 12.3 % (ref 4–5.6)
HCT VFR BLD AUTO: 32 % (ref 37–47)
HCT VFR BLD AUTO: 32 % (ref 37–47)
HCT VFR BLD AUTO: 32.9 % (ref 37–47)
HCT VFR BLD AUTO: 34.5 % (ref 37–47)
HEMOCCULT STL QL: POSITIVE
HGB BLD-MCNC: 10.3 G/DL (ref 12–16)
HGB BLD-MCNC: 10.3 G/DL (ref 12–16)
HGB BLD-MCNC: 10.6 G/DL (ref 12–16)
HGB BLD-MCNC: 11.4 G/DL (ref 12–16)
LACTATE BLD-SCNC: 1.4 MMOL/L (ref 0.5–2)
POTASSIUM SERPL-SCNC: 3.6 MMOL/L (ref 3.6–5.5)
SODIUM SERPL-SCNC: 138 MMOL/L (ref 135–145)
TROPONIN T SERPL-MCNC: 567 NG/L (ref 6–19)

## 2022-06-12 PROCEDURE — 83605 ASSAY OF LACTIC ACID: CPT

## 2022-06-12 PROCEDURE — 74176 CT ABD & PELVIS W/O CONTRAST: CPT | Mod: ME

## 2022-06-12 PROCEDURE — A9270 NON-COVERED ITEM OR SERVICE: HCPCS | Performed by: INTERNAL MEDICINE

## 2022-06-12 PROCEDURE — 700111 HCHG RX REV CODE 636 W/ 250 OVERRIDE (IP): Performed by: INTERNAL MEDICINE

## 2022-06-12 PROCEDURE — 85014 HEMATOCRIT: CPT

## 2022-06-12 PROCEDURE — 99233 SBSQ HOSP IP/OBS HIGH 50: CPT | Performed by: INTERNAL MEDICINE

## 2022-06-12 PROCEDURE — C9113 INJ PANTOPRAZOLE SODIUM, VIA: HCPCS | Performed by: INTERNAL MEDICINE

## 2022-06-12 PROCEDURE — 700105 HCHG RX REV CODE 258: Performed by: INTERNAL MEDICINE

## 2022-06-12 PROCEDURE — 700102 HCHG RX REV CODE 250 W/ 637 OVERRIDE(OP): Performed by: INTERNAL MEDICINE

## 2022-06-12 PROCEDURE — 85018 HEMOGLOBIN: CPT | Mod: 91

## 2022-06-12 PROCEDURE — 770020 HCHG ROOM/CARE - TELE (206)

## 2022-06-12 PROCEDURE — 82962 GLUCOSE BLOOD TEST: CPT | Mod: 91

## 2022-06-12 RX ORDER — ATORVASTATIN CALCIUM 40 MG/1
40 TABLET, FILM COATED ORAL EVERY EVENING
Status: DISCONTINUED | OUTPATIENT
Start: 2022-06-12 | End: 2022-06-18 | Stop reason: HOSPADM

## 2022-06-12 RX ORDER — POLYETHYLENE GLYCOL 3350 17 G/17G
1 POWDER, FOR SOLUTION ORAL
Status: DISCONTINUED | OUTPATIENT
Start: 2022-06-12 | End: 2022-06-18 | Stop reason: HOSPADM

## 2022-06-12 RX ORDER — BISACODYL 10 MG
10 SUPPOSITORY, RECTAL RECTAL
Status: DISCONTINUED | OUTPATIENT
Start: 2022-06-12 | End: 2022-06-18 | Stop reason: HOSPADM

## 2022-06-12 RX ORDER — HYDROMORPHONE HYDROCHLORIDE 1 MG/ML
0.5 INJECTION, SOLUTION INTRAMUSCULAR; INTRAVENOUS; SUBCUTANEOUS ONCE
Status: COMPLETED | OUTPATIENT
Start: 2022-06-12 | End: 2022-06-12

## 2022-06-12 RX ORDER — LORAZEPAM 2 MG/ML
0.5 INJECTION INTRAMUSCULAR ONCE
Status: COMPLETED | OUTPATIENT
Start: 2022-06-12 | End: 2022-06-12

## 2022-06-12 RX ORDER — AMOXICILLIN 250 MG
2 CAPSULE ORAL 2 TIMES DAILY
Status: DISCONTINUED | OUTPATIENT
Start: 2022-06-12 | End: 2022-06-18 | Stop reason: HOSPADM

## 2022-06-12 RX ORDER — LORAZEPAM 2 MG/ML
0.5 INJECTION INTRAMUSCULAR
Status: DISCONTINUED | OUTPATIENT
Start: 2022-06-11 | End: 2022-06-17

## 2022-06-12 RX ORDER — HYDRALAZINE HYDROCHLORIDE 20 MG/ML
5 INJECTION INTRAMUSCULAR; INTRAVENOUS EVERY 4 HOURS PRN
Status: DISCONTINUED | OUTPATIENT
Start: 2022-06-12 | End: 2022-06-18 | Stop reason: HOSPADM

## 2022-06-12 RX ORDER — HYDRALAZINE HYDROCHLORIDE 20 MG/ML
5 INJECTION INTRAMUSCULAR; INTRAVENOUS ONCE
Status: COMPLETED | OUTPATIENT
Start: 2022-06-12 | End: 2022-06-12

## 2022-06-12 RX ORDER — INSULIN LISPRO 100 [IU]/ML
2-9 INJECTION, SOLUTION INTRAVENOUS; SUBCUTANEOUS EVERY 6 HOURS
Status: DISCONTINUED | OUTPATIENT
Start: 2022-06-12 | End: 2022-06-13

## 2022-06-12 RX ORDER — ACETAMINOPHEN 325 MG/1
650 TABLET ORAL EVERY 6 HOURS PRN
Status: DISCONTINUED | OUTPATIENT
Start: 2022-06-12 | End: 2022-06-18 | Stop reason: HOSPADM

## 2022-06-12 RX ADMIN — PANTOPRAZOLE SODIUM 40 MG: 40 INJECTION, POWDER, FOR SOLUTION INTRAVENOUS at 05:43

## 2022-06-12 RX ADMIN — ONDANSETRON 4 MG: 2 INJECTION INTRAMUSCULAR; INTRAVENOUS at 05:25

## 2022-06-12 RX ADMIN — HYDRALAZINE HYDROCHLORIDE 5 MG: 20 INJECTION INTRAMUSCULAR; INTRAVENOUS at 18:31

## 2022-06-12 RX ADMIN — INSULIN LISPRO 6 UNITS: 100 INJECTION, SOLUTION INTRAVENOUS; SUBCUTANEOUS at 05:49

## 2022-06-12 RX ADMIN — LORAZEPAM 0.5 MG: 2 INJECTION INTRAMUSCULAR; INTRAVENOUS at 23:26

## 2022-06-12 RX ADMIN — PANTOPRAZOLE SODIUM 40 MG: 40 INJECTION, POWDER, FOR SOLUTION INTRAVENOUS at 17:31

## 2022-06-12 RX ADMIN — LORAZEPAM 0.5 MG: 2 INJECTION INTRAMUSCULAR; INTRAVENOUS at 00:01

## 2022-06-12 RX ADMIN — LORAZEPAM 0.5 MG: 2 INJECTION INTRAMUSCULAR; INTRAVENOUS at 14:00

## 2022-06-12 RX ADMIN — INSULIN LISPRO 5 UNITS: 100 INJECTION, SOLUTION INTRAVENOUS; SUBCUTANEOUS at 17:37

## 2022-06-12 RX ADMIN — NYSTATIN: 100000 POWDER TOPICAL at 05:57

## 2022-06-12 RX ADMIN — HYDRALAZINE HYDROCHLORIDE 5 MG: 20 INJECTION INTRAMUSCULAR; INTRAVENOUS at 22:47

## 2022-06-12 RX ADMIN — ATORVASTATIN CALCIUM 40 MG: 40 TABLET, FILM COATED ORAL at 17:30

## 2022-06-12 RX ADMIN — INSULIN LISPRO 6 UNITS: 100 INJECTION, SOLUTION INTRAVENOUS; SUBCUTANEOUS at 11:41

## 2022-06-12 RX ADMIN — AZITHROMYCIN MONOHYDRATE 500 MG: 500 INJECTION, POWDER, LYOPHILIZED, FOR SOLUTION INTRAVENOUS at 05:45

## 2022-06-12 RX ADMIN — LORAZEPAM 0.5 MG: 2 INJECTION INTRAMUSCULAR; INTRAVENOUS at 04:24

## 2022-06-12 RX ADMIN — HYDRALAZINE HYDROCHLORIDE 5 MG: 20 INJECTION INTRAMUSCULAR; INTRAVENOUS at 10:29

## 2022-06-12 RX ADMIN — INSULIN LISPRO 5 UNITS: 100 INJECTION, SOLUTION INTRAVENOUS; SUBCUTANEOUS at 23:13

## 2022-06-12 RX ADMIN — NYSTATIN: 100000 POWDER TOPICAL at 17:31

## 2022-06-12 RX ADMIN — CEFTRIAXONE SODIUM 2 G: 2 INJECTION, POWDER, FOR SOLUTION INTRAMUSCULAR; INTRAVENOUS at 05:43

## 2022-06-12 RX ADMIN — ACETAMINOPHEN 650 MG: 325 TABLET, FILM COATED ORAL at 16:16

## 2022-06-12 RX ADMIN — HYDROMORPHONE HYDROCHLORIDE 0.5 MG: 1 INJECTION, SOLUTION INTRAMUSCULAR; INTRAVENOUS; SUBCUTANEOUS at 13:32

## 2022-06-12 ASSESSMENT — PAIN DESCRIPTION - PAIN TYPE
TYPE: ACUTE PAIN
TYPE: ACUTE PAIN;CHRONIC PAIN
TYPE: ACUTE PAIN

## 2022-06-12 ASSESSMENT — ENCOUNTER SYMPTOMS
CLAUDICATION: 0
NAUSEA: 1
COUGH: 1
NECK PAIN: 1
CHILLS: 0
BACK PAIN: 1
PALPITATIONS: 0
SHORTNESS OF BREATH: 1
MYALGIAS: 1
DIZZINESS: 1
VOMITING: 0
HEADACHES: 1
SPUTUM PRODUCTION: 0
ORTHOPNEA: 0
DIARRHEA: 0
ABDOMINAL PAIN: 0
FEVER: 0

## 2022-06-12 NOTE — PROCEDURES
Central Line Insertion    Date/Time: 6/11/2022 6:08 PM  Performed by: Jacqueline Nicole M.D.  Authorized by: Jacqueline Nicole M.D.     Consent:     Consent obtained:  Verbal    Consent given by: Daughter     Risks discussed:  Incorrect placement, arterial puncture, bleeding, infection, pneumothorax and nerve damage    Alternatives discussed:  Delayed treatment, alternative treatment, observation and no treatment  Pre-procedure details:     Hand hygiene: Hand hygiene performed prior to insertion      Sterile barrier technique: All elements of maximal sterile technique followed      Skin preparation:  ChloraPrep    Skin preparation agent: Skin preparation agent completely dried prior to procedure    Anesthesia:     Anesthesia method:  Local infiltration    Local anesthetic:  Lidocaine 1% w/o epi  Procedure details:     Location:  R internal jugular    Procedural supplies:  Triple lumen    Catheter size:  7 Fr    Landmarks identified: yes      Ultrasound guidance: yes      Sterile ultrasound techniques: Sterile gel and sterile probe covers were used      Number of attempts:  1  Post-procedure details:     Post-procedure:  Dressing applied and line sutured    Assessment:  Blood return through all ports and free fluid flow (Pending CXR )    Patient tolerance of procedure:  Tolerated well, no immediate complications

## 2022-06-12 NOTE — PROGRESS NOTES
Updated  via phone on pt condition, labs and to clarify orders regarding insulin drip and parameters. Order received to stop insulin drip and continue D5LR fluids due to pt vomiting and no po intake. Continue trending H&H and troponins.

## 2022-06-12 NOTE — CARE PLAN
The patient is     Shift Goals  Clinical Goals: GAP < 12, CO2 >17  Patient Goals: FRANCISCO    Progress made toward(s) clinical / shift goals:  ongoing    Patient is not progressing towards the following goals:      Problem: Knowledge Deficit - Standard  Goal: Patient and family/care givers will demonstrate understanding of plan of care, disease process/condition, diagnostic tests and medications  6/11/2022 1912 by Evie Grimm R.N.  Outcome: Not Met  6/11/2022 1911 by Evie Grimm, R.N.  Outcome: Not Met     Problem: Pain - Standard  Goal: Alleviation of pain or a reduction in pain to the patient’s comfort goal  6/11/2022 1912 by Evie Grimm R.N.  Outcome: Not Met  6/11/2022 1911 by Evie Grimm, R.N.  Outcome: Not Met     Problem: Skin Integrity  Goal: Skin integrity is maintained or improved  6/11/2022 1912 by Evie Grimm, R.N.  Outcome: Not Met  6/11/2022 1911 by Evie Grimm, R.N.  Outcome: Not Met     Problem: Fall Risk  Goal: Patient will remain free from falls  6/11/2022 1912 by Evie Grimm, R.N.  Outcome: Not Met  6/11/2022 1911 by Evie Grimm, R.N.  Outcome: Not Met

## 2022-06-12 NOTE — PALLIATIVE CARE
PALLIATIVE CARE:    Dr. Nicole phoned PC Office to discuss pt.     Reviewed pt's POLST over the phone with primary RN Yvette. Apparently signed by pt yesterday with OSH MD for DNR/comfort but then family told MD that pt was confused about comfort care.    Plan:  Palliative Care to complete full ACP consult tomorrow.    Thank you for allowing Palliative Care to support this pt and her family.  Contact [phone] for additional assistance, patient status change, questions or concerns.

## 2022-06-12 NOTE — CARE PLAN
The patient is {Patient Stability:[id number]}    Shift Goals  Clinical Goals: GAP < 12, CO2 >17  Patient Goals: FRANCISCO    Progress made toward(s) clinical / shift goals:  ***    Patient is not progressing towards the following goals:      Problem: Knowledge Deficit - Standard  Goal: Patient and family/care givers will demonstrate understanding of plan of care, disease process/condition, diagnostic tests and medications  Outcome: Not Met     Problem: Pain - Standard  Goal: Alleviation of pain or a reduction in pain to the patient’s comfort goal  Outcome: Not Met     Problem: Skin Integrity  Goal: Skin integrity is maintained or improved  Outcome: Not Met     Problem: Fall Risk  Goal: Patient will remain free from falls  Outcome: Not Met

## 2022-06-12 NOTE — PROGRESS NOTES
Updated  regarding stopping IVF bolus due to pt having increased crackles bilat as well as increase O2 needs from 8L to 12L oxymask. New orders received.

## 2022-06-12 NOTE — PROGRESS NOTES
4 Eyes Skin Assessment Completed by WILLY Martin and Aleshia RN.    Head WDL  Ears WDL  Nose WDL  Mouth WDL  Neck WDL  Breast/Chest WDL  Shoulder Blades WDL  Spine Bruising  (R) Arm/Elbow/Hand WDL  (L) Arm/Elbow/Hand WDL  Abdomen WDL  Groin Redness, rash  Scrotum/Coccyx/Buttocks WDL  (R) Leg WDL  (L) Leg WDL  (R) Heel/Foot/Toe Scab  (L) Heel/Foot/Toe WDL          Devices In Places Oxy Mask      Interventions In Place Low Air Loss Mattress    Possible Skin Injury No    Pictures Uploaded Into Epic N/A  Wound Consult Placed N/A  RN Wound Prevention Protocol Ordered No

## 2022-06-12 NOTE — THERAPY
Missed Therapy     Patient Name: Yaima Willis  Age:  67 y.o., Sex:  female  Medical Record #: [id number]  Today's Date: 6/12/2022 06/12/22 1505   Interdisciplinary Plan of Care Collaboration   IDT Collaboration with  Nursing   Collaboration Comments Order received for a CSE, chart reviewed. Spoke with RN, who reported that pt was given medication and was not awake enough to actively/safely participate. Cortrak intact. Will hold and f/u tomorrow. Thank you.

## 2022-06-12 NOTE — PROGRESS NOTES
Patient found with opened ice pack near mouth area and ice/water all over gown and bed. Oxymask off of patient and O2 sats in upper 80's. When asked if patient had been drinking water out of ice pack, patient said no. Patient also coughing. Reinforced strict NPO status and removed all ice packs from room as precautionary measures. O2 sats back in mid 90s with oxymask back on. Will continue to monitor.

## 2022-06-12 NOTE — PROGRESS NOTES
12-hour chart check complete.    Monitor Summary  Rhythm: SR w/ rare PAC  Rate: 64  Ectopy: PAC  Measurements: 0.16/0.07/0.55

## 2022-06-12 NOTE — PROGRESS NOTES
1740: Patient's BP read 77/44. Nurse went into room to adjust cuff and retake pressure. Repeat BP 91/43. Patient also nonarousable to voice. Patient able to open eyes to sternal rub but would not follow commands. Dr. Nicole notified and ordered STAT ABG. Patient now nonarousable to sternal rub. Dr. Nicole at bedside. Dr. Nicole called family. STAT CT ordered and central line inserted for vasopressor therapy. Patient awoke during central line procedure. Patient responsive and following commands. Patient then taken to CT.     Family updated by charge RN.

## 2022-06-12 NOTE — PROGRESS NOTES
12-hour chart check complete. Patient responsive to voice, following commands, and resting in bed.    Monitor Summary  Rhythm: SR  Rate: 65-77  Ectopy: fPACs, rPVCs  Measurements: 0.20/0.08/0.38

## 2022-06-12 NOTE — ASSESSMENT & PLAN NOTE
-- Concern for aspiration PNA as patient failed bedside swallow evaluation   -- Will make patient strict NPO  -- Cortrack placement    -- SLP consultation   -- HOB elevation   -- Aspiration precaution

## 2022-06-12 NOTE — PROGRESS NOTES
Pulmonary Progress Note    Date of admission  6/11/2022    Chief Complaint  67 y.o. female admitted 6/11/2022 with DKA.     Hospital Course  Yaima Willis is a 67 y.o. female who was transferred from [facility] ED on 6/11/2022 for DKA management. Patient with significant history of DM, morbid obesity, and hypertension who presents to [facility] ED with complaints of feeling ill for the past week. Associated with generalized pain, chest pain, diarrhea, SOB, and chills. EKG showed A fib w/ HR ~127 and ST depression in lead I and avL. Labs from OSH notable for WBC 24.8, glucose 939, BUN 76, Cr 2.4, Na 127, K 4.7, Cl 88, Co2 7, and AG 32. She was resuscitated with three liters crystalloid and started on insulin infusion ~10 units/hour. While in the ER awaiting for transfer, RN reported patient had an episode of hematemesis. Transferred to [facility] for further management.     Upon arrival to [facility], patient is confuse but able to answer simple questions. Endorse having chest pain, diffuse body aches, chills and nausea. Ordered labs, 1 liter LR bolus, and started on insulin infusion 0.05 units//kg/hr.    6/11: Patient became acutely unresponsive with GCS 5 X 15 minutes but protecting airway. ABG -> 7.33/46/65. Emergent RIJ CVC placed and patient woke up following simple commands. Stat CTH showed no acute abnormality. AG closed and stopped insulin infusion. Failed bedside swallow evaluation.     6/12: No acute issues overnight. Renal function improving slowly. On 6 liters oxymask. Net positive 2.4 liters. Cx negative to date. On CAP coverage. Patient is more awake and able to follow simple commands. Patient is requesting to be kept comfortable but does not understanding risks/benefits of comfort care. Spoke to patient's son at bedside and they are planning for a family meeting this afternoon. Palliative care consulted.     Interval Problem Update  Reviewed last 24 hour events:  As above     Review of Systems  Review of  Systems   Constitutional: Positive for malaise/fatigue. Negative for chills and fever.   Respiratory: Positive for cough and shortness of breath. Negative for sputum production.    Cardiovascular: Positive for chest pain. Negative for palpitations, orthopnea and claudication.   Gastrointestinal: Positive for nausea. Negative for abdominal pain, diarrhea and vomiting.   Genitourinary: Negative for dysuria, frequency and urgency.   Musculoskeletal: Positive for back pain, myalgias and neck pain.   Neurological: Positive for dizziness and headaches.   All other systems reviewed and are negative.       Vital Signs for last 24 hours   Temp:  [36 °C (96.8 °F)-37.8 °C (100 °F)] 37.8 °C (100 °F)  Pulse:  [60-84] 80  Resp:  [11-47] 29  BP: ()/() 185/71  SpO2:  [84 %-99 %] 96 %    Hemodynamic parameters for last 24 hours       Respiratory Information for the last 24 hours       Physical Exam   Physical Exam  Constitutional:       Appearance: She is obese. She is ill-appearing.      Comments: Oriented to self and location; not aware of current situation.      HENT:      Head: Normocephalic.      Nose: Nose normal.      Mouth/Throat:      Mouth: Mucous membranes are moist.   Eyes:      Pupils: Pupils are equal, round, and reactive to light.   Cardiovascular:      Rate and Rhythm: Normal rate and regular rhythm.      Pulses: Normal pulses.      Heart sounds: Normal heart sounds. No murmur heard.  Pulmonary:      Comments: Coarse BS bilaterally    Abdominal:      Comments: Obese, nontender, hypoactive bowel sounds   Musculoskeletal:         General: Normal range of motion.      Cervical back: Normal range of motion.      Right lower leg: No edema.      Left lower leg: No edema.   Neurological:      Cranial Nerves: Cranial nerve deficit present.      Comments: Oriented to self and location; not aware of current situation.          Medications  Current Facility-Administered Medications   Medication Dose Route Frequency  Provider Last Rate Last Admin   • LORazepam (ATIVAN) injection 0.5 mg  0.5 mg Intravenous Q3HRS PRN Herman Fields D.O.   0.5 mg at 06/12/22 0424   • insulin GLARGINE (Lantus,Semglee) injection  0.2 Units/kg/day Subcutaneous Q EVENING Jacqueline Nicole M.D.        And   • insulin lispro (AdmeLOG,HumaLOG) injection  2-9 Units Subcutaneous Q6HRS Jacqueline Nicole M.D.   6 Units at 06/12/22 0549    And   • dextrose 10 % BOLUS 25 g  25 g Intravenous Q15 MIN PRN Jacqueline Nicole M.D.       • senna-docusate (PERICOLACE or SENOKOT S) 8.6-50 MG per tablet 2 Tablet  2 Tablet Oral BID Jacqueline Nicole M.D.        And   • polyethylene glycol/lytes (MIRALAX) PACKET 1 Packet  1 Packet Oral QDAY PRN Jacqueline Nicole M.D.        And   • magnesium hydroxide (MILK OF MAGNESIA) suspension 30 mL  30 mL Oral QDAY PRN Jacqueline Nicole M.D.        And   • bisacodyl (DULCOLAX) suppository 10 mg  10 mg Rectal QDAY PRN Jacqueline Nicole M.D.       • acetaminophen (Tylenol) tablet 650 mg  650 mg Oral Q6HRS PRN Jacqueline Nicole M.D.       • ondansetron (ZOFRAN) syringe/vial injection 4 mg  4 mg Intravenous Q4HRS PRN Jacqueline Nicole M.D.   4 mg at 06/12/22 0525   • ondansetron (ZOFRAN ODT) dispertab 4 mg  4 mg Oral Q4HRS PRN Jacqueline Nicole M.D.       • pantoprazole (Protonix) injection 40 mg  40 mg Intravenous BID Jacqueline Nicole M.D.   40 mg at 06/12/22 0543   • cefTRIAXone (Rocephin) 2 g in  mL IVPB  2 g Intravenous Q24HRS Jacqueline Nicole M.D.   Stopped at 06/12/22 0613   • atorvastatin (LIPITOR) tablet 40 mg  40 mg Oral Q EVENING Jacqueline Nicole M.D.       • azithromycin (ZITHROMAX) 500 mg in  mL IVPB  500 mg Intravenous Q24HRS Jacqueline Nicole M.D.   Stopped at 06/12/22 0645   • hydrALAZINE (APRESOLINE) injection 10 mg  10 mg Intravenous Q4HRS PRN Jacqueline Nicole M.D.       • nystatin (MYCOSTATIN) powder   Topical BID Jacqueline Nicole M.D.   Given at 06/12/22 0557       Fluids    Intake/Output Summary (Last 24 hours) at 6/12/2022 1045  Last data filed at 6/12/2022 1000  Gross per 24 hour   Intake  5717.86 ml   Output 2835 ml   Net 2882.86 ml       Laboratory  Recent Labs     06/11/22  1755   ISTATAPH 7.339*   ISTATAPCO2 46.6*   ISTATAPO2 65   ISTATATCO2 26   RXBBDYJ2SPE 91*   ISTATARTHCO3 25.1*   ISTATARTBE -1   ISTATTEMP 98.6 F   ISTATSPEC Arterial   ISTATAPHTC 7.339*   [id number] 65         Recent Labs     06/11/22  1203 06/11/22  1721 06/11/22  1927 06/11/22  2322   SODIUM 135 137 137 138   POTASSIUM 4.0 3.5* 3.4* 3.6   CHLORIDE 99 103 104 106   CO2 19* 22 24 23   BUN 74* 65* 60* 55*   CREATININE 1.99* 1.69* 1.61* 1.58*   MAGNESIUM 2.1  --  2.0  --    PHOSPHORUS 3.2  --  2.7  --    CALCIUM 8.2* 8.1* 8.1* 8.3*     Recent Labs     06/11/22 1203 06/11/22 1721 06/11/22 1927 06/11/22 2322   ALTSGPT 8  --   --   --    ASTSGOT 21  --   --   --    ALKPHOSPHAT 102*  --   --   --    TBILIRUBIN 0.3  --   --   --    LIPASE  --  8  --   --    GLUCOSE 410* 243* 195* 237*     Recent Labs     06/11/22 1203 06/11/22 2322   WBC 26.9* 22.3*   NEUTSPOLYS 86.40* 83.10*   LYMPHOCYTES 6.10* 10.30*   MONOCYTES 5.70 5.40   EOSINOPHILS 0.00 0.10   BASOPHILS 0.30 0.10   ASTSGOT 21  --    ALTSGPT 8  --    ALKPHOSPHAT 102*  --    TBILIRUBIN 0.3  --      Recent Labs     06/11/22 1203 06/11/22 1927 06/11/22 2322 06/12/22  0550   RBC 4.12*  --  3.80*  --    HEMOGLOBIN 11.2* 9.6* 10.4* 11.4*   HEMATOCRIT 32.8* 28.9* 31.3* 34.5*   PLATELETCT 275  --  250  --        Imaging  CTH:   1.  No acute intracranial findings.     2.  Diffuse atrophy    CT abdomen/pelvis:  Moderate lower lobe consolidation on the right, peribronchial opacity on the left is worrisome for aspiration pneumonitis favored over pneumonia     Moderate pericardial effusion     Remote granulomatous disease     Subtle paraduodenal, peripancreatic fat stranding could indicate pancreatitis, duodenitis or may just be chronic, age related. Recommend correlation with pancreatic enzymes     Large intrauterine mass most likely is a leiomyoma but given the large size  recommended gynecologic surgical referral as there is risk of sarcomatous degeneration when large    TTE 6/11:   Normal left ventricular systolic function.  Moderate concentric left ventricular hypertrophy.  No pericardial effusion       Assessment/Plan  Aspiration into lower respiratory tract  Assessment & Plan  -- Concern for aspiration PNA as patient failed bedside swallow evaluation   -- Will make patient strict NPO  -- Cortrack placement    -- SLP consultation   -- HOB elevation   -- Aspiration precaution     Hematemesis  Assessment & Plan  -- Possible related to intractable N/V causing nasrin dorman tear. Other ddx includes AVM vs PUD.   -- On protonix 40 IV BID   -- H/H stable   -- Trend H/H every 12 hours   -- Goal Hb > 7      Sepsis (HCC)  Assessment & Plan  This is Severe Sepsis Present on admission  SIRS criteria identified on my evaluation include: Tachycardia, with heart rate greater than 90 BPM, Tachypnea, with respirations greater than 20 per minute and Leukocytosis, with WBC greater than 12,000  Clinical indicators of end organ dysfunction include Creatinine >2.0 (Without ESRD or CKD)  Source is PNA vs UTI   Sepsis protocol initiated  Crystalloid Fluid Administration: Fluid resuscitation ordered per standard protocol - 30 mL/kg per current or ideal body weight  IV antibiotics as appropriate for source of sepsis  Reassessment: I have reassessed the patient's hemodynamic status    On CAP coverage   Follow up urinary legionella/strep   Cx negative to date   Follow up cx data       Chest pain  Assessment & Plan  -- Unclear etiology. Differentials include ACS vs musculoskeletal vs pleuritis.   -- Sepsis workup   -- TTE showed normal EF with no WMA    -- Cont lipitor; holding ASA due to hematemesis    -- Monitor on telemetry       Hypertension  Assessment & Plan  -- Not on any home BP meds    -- Added hydralazine 5 mg IV q4 hr prn SBP > 180  -- Goal SBP < 140      Acute renal failure (ARF)  ([facility])  Assessment & Plan  -- Secondary to dehydration and severe DKA   -- Improving slowly    -- Avoid nephrotoxin agents   -- Daily BMP   -- Monitor UOP   -- Meds dose to GFR       DM (diabetes mellitus) ([facility])  Assessment & Plan  -- DKA resolve   -- On lantus 15 units   -- On ISS    -- Goal BS < 180      Palliative care consulted for further goals of care discussion.     VTE:  Contraindicated  Ulcer: PPI  Lines: Central Line  Ongoing indication addressed and Padilla Catheter  Ongoing indication addressed    I have performed a physical exam and reviewed and updated ROS and Plan today (6/12/2022). In review of yesterday's note (6/11/2022), there are no changes except as documented above.     Discussed patient condition and risk of morbidity and/or mortality with Family, RN, RT, Pharmacy and Patient

## 2022-06-12 NOTE — CARE PLAN
Problem: Pain - Standard  Goal: Alleviation of pain or a reduction in pain to the patient’s comfort goal  Outcome: Progressing  Flowsheets (Taken 6/12/2022 0050)  Pain Rating Scale (NPRS): 0  Note: Pt denies pain. C/o pain in abdomen after vomiting. Pt resting comfortably on left side     Problem: Skin Integrity  Goal: Skin integrity is maintained or improved  Outcome: Progressing  Note: Skin maintained clean and dry. Pt turned and repositioned     Problem: Fall Risk  Goal: Patient will remain free from falls  Outcome: Progressing  Note: Remains free from falls and injuries. Bed alarm on. Call light in reach   The patient is Unstable - High likelihood or risk of patient condition declining or worsening    Shift Goals  Clinical Goals: Maintain BP and MAP;Transition off insulin drip  Patient Goals: Sleep    Progress made toward(s) clinical / shift goals:  BP and MAP maintained stable

## 2022-06-13 ENCOUNTER — APPOINTMENT (OUTPATIENT)
Dept: RADIOLOGY | Facility: MEDICAL CENTER | Age: 67
DRG: 637 | End: 2022-06-13
Attending: FAMILY MEDICINE
Payer: MEDICARE

## 2022-06-13 PROBLEM — R93.5 ABNORMAL CT OF THE ABDOMEN: Status: ACTIVE | Noted: 2022-06-13

## 2022-06-13 PROBLEM — I48.91 NEW ONSET ATRIAL FIBRILLATION (HCC): Status: ACTIVE | Noted: 2022-06-13

## 2022-06-13 PROBLEM — I31.39 PERICARDIAL EFFUSION: Status: ACTIVE | Noted: 2022-06-13

## 2022-06-13 PROBLEM — J18.9 BILATERAL PNEUMONIA: Status: ACTIVE | Noted: 2022-06-13

## 2022-06-13 PROBLEM — E87.6 HYPOKALEMIA: Status: ACTIVE | Noted: 2022-06-13

## 2022-06-13 PROBLEM — E11.10 DKA (DIABETIC KETOACIDOSIS) (HCC): Status: ACTIVE | Noted: 2022-06-13

## 2022-06-13 PROBLEM — I21.4 NSTEMI (NON-ST ELEVATED MYOCARDIAL INFARCTION) (HCC): Status: ACTIVE | Noted: 2022-06-13

## 2022-06-13 PROBLEM — J96.01 ACUTE RESPIRATORY FAILURE WITH HYPOXEMIA (HCC): Status: ACTIVE | Noted: 2022-06-13

## 2022-06-13 PROBLEM — N85.8 UTERINE MASS: Status: ACTIVE | Noted: 2022-06-13

## 2022-06-13 LAB
ANION GAP SERPL CALC-SCNC: 12 MMOL/L (ref 7–16)
BASOPHILS # BLD AUTO: 0.1 % (ref 0–1.8)
BASOPHILS # BLD: 0.03 K/UL (ref 0–0.12)
BUN SERPL-MCNC: 30 MG/DL (ref 8–22)
CALCIUM SERPL-MCNC: 8.8 MG/DL (ref 8.4–10.2)
CHLORIDE SERPL-SCNC: 110 MMOL/L (ref 96–112)
CO2 SERPL-SCNC: 24 MMOL/L (ref 20–33)
CREAT SERPL-MCNC: 1.35 MG/DL (ref 0.5–1.4)
EOSINOPHIL # BLD AUTO: 0.01 K/UL (ref 0–0.51)
EOSINOPHIL NFR BLD: 0 % (ref 0–6.9)
ERYTHROCYTE [DISTWIDTH] IN BLOOD BY AUTOMATED COUNT: 49.3 FL (ref 35.9–50)
GFR SERPLBLD CREATININE-BSD FMLA CKD-EPI: 43 ML/MIN/1.73 M 2
GLUCOSE BLD STRIP.AUTO-MCNC: 258 MG/DL (ref 65–99)
GLUCOSE BLD STRIP.AUTO-MCNC: 272 MG/DL (ref 65–99)
GLUCOSE BLD STRIP.AUTO-MCNC: 280 MG/DL (ref 65–99)
GLUCOSE BLD STRIP.AUTO-MCNC: 343 MG/DL (ref 65–99)
GLUCOSE BLD STRIP.AUTO-MCNC: 343 MG/DL (ref 65–99)
GLUCOSE SERPL-MCNC: 273 MG/DL (ref 65–99)
HCT VFR BLD AUTO: 32.7 % (ref 37–47)
HCT VFR BLD AUTO: 33.9 % (ref 37–47)
HGB BLD-MCNC: 10.4 G/DL (ref 12–16)
HGB BLD-MCNC: 10.7 G/DL (ref 12–16)
IMM GRANULOCYTES # BLD AUTO: 0.34 K/UL (ref 0–0.11)
IMM GRANULOCYTES NFR BLD AUTO: 1.7 % (ref 0–0.9)
LYMPHOCYTES # BLD AUTO: 1.76 K/UL (ref 1–4.8)
LYMPHOCYTES NFR BLD: 8.6 % (ref 22–41)
MCH RBC QN AUTO: 27 PG (ref 27–33)
MCHC RBC AUTO-ENTMCNC: 31.6 G/DL (ref 33.6–35)
MCV RBC AUTO: 85.6 FL (ref 81.4–97.8)
MONOCYTES # BLD AUTO: 1.07 K/UL (ref 0–0.85)
MONOCYTES NFR BLD AUTO: 5.2 % (ref 0–13.4)
NEUTROPHILS # BLD AUTO: 17.24 K/UL (ref 2–7.15)
NEUTROPHILS NFR BLD: 84.4 % (ref 44–72)
NRBC # BLD AUTO: 0 K/UL
NRBC BLD-RTO: 0 /100 WBC
NT-PROBNP SERPL IA-MCNC: ABNORMAL PG/ML (ref 0–125)
PLATELET # BLD AUTO: 259 K/UL (ref 164–446)
PMV BLD AUTO: 12.2 FL (ref 9–12.9)
POTASSIUM SERPL-SCNC: 3.3 MMOL/L (ref 3.6–5.5)
RBC # BLD AUTO: 3.96 M/UL (ref 4.2–5.4)
SCCMEC + MECA PNL NOSE NAA+PROBE: NEGATIVE
SODIUM SERPL-SCNC: 146 MMOL/L (ref 135–145)
TROPONIN T SERPL-MCNC: 271 NG/L (ref 6–19)
TROPONIN T SERPL-MCNC: 280 NG/L (ref 6–19)
WBC # BLD AUTO: 20.5 K/UL (ref 4.8–10.8)

## 2022-06-13 PROCEDURE — 700105 HCHG RX REV CODE 258: Performed by: INTERNAL MEDICINE

## 2022-06-13 PROCEDURE — 87641 MR-STAPH DNA AMP PROBE: CPT

## 2022-06-13 PROCEDURE — A9270 NON-COVERED ITEM OR SERVICE: HCPCS | Performed by: FAMILY MEDICINE

## 2022-06-13 PROCEDURE — 83880 ASSAY OF NATRIURETIC PEPTIDE: CPT

## 2022-06-13 PROCEDURE — 71045 X-RAY EXAM CHEST 1 VIEW: CPT

## 2022-06-13 PROCEDURE — 84484 ASSAY OF TROPONIN QUANT: CPT

## 2022-06-13 PROCEDURE — 770020 HCHG ROOM/CARE - TELE (206)

## 2022-06-13 PROCEDURE — 700111 HCHG RX REV CODE 636 W/ 250 OVERRIDE (IP): Performed by: FAMILY MEDICINE

## 2022-06-13 PROCEDURE — 85018 HEMOGLOBIN: CPT

## 2022-06-13 PROCEDURE — 700111 HCHG RX REV CODE 636 W/ 250 OVERRIDE (IP): Performed by: INTERNAL MEDICINE

## 2022-06-13 PROCEDURE — 85014 HEMATOCRIT: CPT

## 2022-06-13 PROCEDURE — 92610 EVALUATE SWALLOWING FUNCTION: CPT

## 2022-06-13 PROCEDURE — 99221 1ST HOSP IP/OBS SF/LOW 40: CPT | Performed by: INTERNAL MEDICINE

## 2022-06-13 PROCEDURE — 700102 HCHG RX REV CODE 250 W/ 637 OVERRIDE(OP): Performed by: INTERNAL MEDICINE

## 2022-06-13 PROCEDURE — 80048 BASIC METABOLIC PNL TOTAL CA: CPT

## 2022-06-13 PROCEDURE — 700102 HCHG RX REV CODE 250 W/ 637 OVERRIDE(OP): Performed by: FAMILY MEDICINE

## 2022-06-13 PROCEDURE — 82962 GLUCOSE BLOOD TEST: CPT

## 2022-06-13 PROCEDURE — A9270 NON-COVERED ITEM OR SERVICE: HCPCS | Performed by: INTERNAL MEDICINE

## 2022-06-13 PROCEDURE — 85025 COMPLETE CBC W/AUTO DIFF WBC: CPT

## 2022-06-13 PROCEDURE — 99233 SBSQ HOSP IP/OBS HIGH 50: CPT | Performed by: FAMILY MEDICINE

## 2022-06-13 PROCEDURE — C9113 INJ PANTOPRAZOLE SODIUM, VIA: HCPCS | Performed by: INTERNAL MEDICINE

## 2022-06-13 RX ORDER — INSULIN LISPRO 100 [IU]/ML
5 INJECTION, SOLUTION INTRAVENOUS; SUBCUTANEOUS
Status: DISCONTINUED | OUTPATIENT
Start: 2022-06-14 | End: 2022-06-18 | Stop reason: HOSPADM

## 2022-06-13 RX ORDER — INSULIN LISPRO 100 [IU]/ML
2-9 INJECTION, SOLUTION INTRAVENOUS; SUBCUTANEOUS EVERY 6 HOURS
Status: DISCONTINUED | OUTPATIENT
Start: 2022-06-13 | End: 2022-06-18 | Stop reason: HOSPADM

## 2022-06-13 RX ORDER — CLONIDINE 0.1 MG/24H
1 PATCH, EXTENDED RELEASE TRANSDERMAL
Status: DISCONTINUED | OUTPATIENT
Start: 2022-06-13 | End: 2022-06-13

## 2022-06-13 RX ORDER — INSULIN LISPRO 100 [IU]/ML
2-9 INJECTION, SOLUTION INTRAVENOUS; SUBCUTANEOUS EVERY 6 HOURS
Status: DISCONTINUED | OUTPATIENT
Start: 2022-06-13 | End: 2022-06-13

## 2022-06-13 RX ORDER — DOXYCYCLINE 100 MG/1
100 TABLET ORAL EVERY 12 HOURS
Status: DISCONTINUED | OUTPATIENT
Start: 2022-06-14 | End: 2022-06-13

## 2022-06-13 RX ORDER — POTASSIUM CHLORIDE 7.45 MG/ML
10 INJECTION INTRAVENOUS
Status: COMPLETED | OUTPATIENT
Start: 2022-06-13 | End: 2022-06-13

## 2022-06-13 RX ORDER — DOXYCYCLINE 100 MG/1
100 TABLET ORAL EVERY 12 HOURS
Status: COMPLETED | OUTPATIENT
Start: 2022-06-14 | End: 2022-06-17

## 2022-06-13 RX ADMIN — POTASSIUM CHLORIDE 10 MEQ: 7.46 INJECTION, SOLUTION INTRAVENOUS at 10:53

## 2022-06-13 RX ADMIN — POTASSIUM CHLORIDE 10 MEQ: 7.46 INJECTION, SOLUTION INTRAVENOUS at 13:59

## 2022-06-13 RX ADMIN — PANTOPRAZOLE SODIUM 40 MG: 40 INJECTION, POWDER, FOR SOLUTION INTRAVENOUS at 17:50

## 2022-06-13 RX ADMIN — INSULIN LISPRO 6 UNITS: 100 INJECTION, SOLUTION INTRAVENOUS; SUBCUTANEOUS at 11:59

## 2022-06-13 RX ADMIN — HYDRALAZINE HYDROCHLORIDE 5 MG: 20 INJECTION INTRAMUSCULAR; INTRAVENOUS at 08:05

## 2022-06-13 RX ADMIN — AZITHROMYCIN MONOHYDRATE 500 MG: 500 INJECTION, POWDER, LYOPHILIZED, FOR SOLUTION INTRAVENOUS at 05:23

## 2022-06-13 RX ADMIN — METOPROLOL TARTRATE 12.5 MG: 25 TABLET, FILM COATED ORAL at 14:59

## 2022-06-13 RX ADMIN — HYDRALAZINE HYDROCHLORIDE 5 MG: 20 INJECTION INTRAMUSCULAR; INTRAVENOUS at 14:05

## 2022-06-13 RX ADMIN — ATORVASTATIN CALCIUM 40 MG: 40 TABLET, FILM COATED ORAL at 17:50

## 2022-06-13 RX ADMIN — CEFTRIAXONE SODIUM 2 G: 2 INJECTION, POWDER, FOR SOLUTION INTRAMUSCULAR; INTRAVENOUS at 05:23

## 2022-06-13 RX ADMIN — PANTOPRAZOLE SODIUM 40 MG: 40 INJECTION, POWDER, FOR SOLUTION INTRAVENOUS at 06:00

## 2022-06-13 RX ADMIN — ONDANSETRON 4 MG: 4 TABLET, ORALLY DISINTEGRATING ORAL at 14:59

## 2022-06-13 RX ADMIN — CLONIDINE 1 PATCH: 0.1 PATCH TRANSDERMAL at 09:28

## 2022-06-13 RX ADMIN — NYSTATIN: 100000 POWDER TOPICAL at 05:23

## 2022-06-13 RX ADMIN — LORAZEPAM 0.5 MG: 2 INJECTION INTRAMUSCULAR; INTRAVENOUS at 08:05

## 2022-06-13 RX ADMIN — INSULIN LISPRO 5 UNITS: 100 INJECTION, SOLUTION INTRAVENOUS; SUBCUTANEOUS at 05:32

## 2022-06-13 RX ADMIN — ACETAMINOPHEN 650 MG: 325 TABLET, FILM COATED ORAL at 03:33

## 2022-06-13 RX ADMIN — INSULIN LISPRO 6 UNITS: 100 INJECTION, SOLUTION INTRAVENOUS; SUBCUTANEOUS at 23:45

## 2022-06-13 RX ADMIN — INSULIN LISPRO 5 UNITS: 100 INJECTION, SOLUTION INTRAVENOUS; SUBCUTANEOUS at 17:53

## 2022-06-13 RX ADMIN — POTASSIUM CHLORIDE 10 MEQ: 7.46 INJECTION, SOLUTION INTRAVENOUS at 15:09

## 2022-06-13 RX ADMIN — ONDANSETRON 4 MG: 4 TABLET, ORALLY DISINTEGRATING ORAL at 09:27

## 2022-06-13 RX ADMIN — POTASSIUM CHLORIDE 10 MEQ: 7.46 INJECTION, SOLUTION INTRAVENOUS at 11:57

## 2022-06-13 ASSESSMENT — ENCOUNTER SYMPTOMS
ABDOMINAL PAIN: 0
VOMITING: 0
PALPITATIONS: 0
FEVER: 1
SHORTNESS OF BREATH: 1
NAUSEA: 0
ORTHOPNEA: 0
COUGH: 1
CHILLS: 1

## 2022-06-13 ASSESSMENT — PAIN DESCRIPTION - PAIN TYPE
TYPE: ACUTE PAIN;CHRONIC PAIN
TYPE: ACUTE PAIN

## 2022-06-13 NOTE — THERAPY
Speech Language Pathology   Clinical Swallow Evaluation     Patient Name: Yaima Willis  AGE:  67 y.o., SEX:  female  Medical Record #: [id number]  Today's Date: 6/13/2022     Precautions: Fall Risk, Swallow Precautions ( See Comments), Nasogastric Tube      HPI: The pt is a 66 y/o F who was admitted 6/11/22. The pt presented to [facility] ED on 6/11 for DKA management, transferred to [facility] for HLOC.     PMHx:  DM, morbid obesity, HTN    CXR 6/11 - 1.  Pulmonary vascular prominence with increased interstitial markings are suggestive of edema. There is likely a component of vessel crowding secondary to portable AP technique and low lung volumes.  2.  Right internal jugular catheter is noted with the tip projecting over the superior vena cava. No pneumothorax is identified.    CT Head 6/11 - 1.  No acute intracranial findings.  2.  Diffuse atrophy      Level of Consciousness: Alert, Awake  Affect/Behavior: Appropriate, Calm, Fatigued  Follows Directives: Yes  Orientation: Oriented x 4  Hearing: Functional hearing  Vision: Functional vision      Prior Living Situation & Level of Function:  Pt reported that she lives with her  and adult children. She denied any previous issues with swallowing.      Oral Mechanism Evaluation  Facial Symmetry: Equal  Facial Sensation: Equal  Labial Observations: WFL  Lingual Observations: Midline, Xerostomia  Dentition: Edentulous, Full dentures, Denture(s) not available at time of evaluation  Comments: Generalized weakness noted with xerostomia present. Pt was edentulous, no dentures present.      Voice  Quality: WFL  Resonance: WFL  Intensity: Soft  Cough: WFL  Comments:      Current Method of Nutrition   NGT/Cortrak - intact to ANEUDY Acosta  RN cleared the pt for speech tx, no acute changes reported. Pt was received asleep but was arousable to voice. She was pleasant and cooperative. Pt seen wearing right wrist restraint. No visitors present.        Assessment  Positioning: Semi Dominguez's (30-45 degrees)  Bolus Administration: SLP  Oxygen Requirements: 6L oxymask  Factor(s) Affecting Performance: None    Swallowing Trials  Ice: WFL  Thin Liquid (TN0): WFL  Pureed (PU4): WFL    Comments: More solid consistencies not attempted d/t lack of dentures and SLP concern for fatigue. Appropriate oral acceptance and sufficient containment. Presumed slowed oral manipulation and lingual motion. Complete oral clearance. Pt observed with hyperextension of her neck, requiring verbal cues to tilt her head in a more neutral position. No overt signs of aspiration noted. Voice was dry pre and post oral intake. Pt endorsed mild swallow discomfort, which appeared r/t NGT.         Clinical Impressions  The pt presents with a grossly functional oropharyngeal swallow for consistencies assessed. No pharyngeal dysphagia suspected however may be impacted by positioning and bolus volume. Aspiration risk from oral intake appears mildly elevated with indicated for a short-term modified diet.       Recommendations  1.  Pureed solids (PU4), Thin liquids (TN0)  2.  Instrumentation: None indicated at this time  3.  Swallowing Instructions & Precautions:   Supervision: 1:1 feeding with constant supervision  Positioning: Fully upright and midline during oral intake  Medication: Crush with applesauce or puree, as appropriate, Non Oral , As tolerated  Strategies: Small bites/sips, Alternate bites and sips, Slow rate of intake  Oral Care: Q4h  4.  SLP to follow for dysphagia management.    Results and recs d/w pt and RN. Thank you.    Plan  Recommend Speech Therapy 3 times per week until therapy goals are met for the following treatments:  Dysphagia Training and Patient / Family / Caregiver Education.  Discharge Recommendations:  (TBD pending clinical progress)       06/13/22 0919   Charge Group   SLP Oral Pharyngeal Evaluation Oral Pharyngeal Evaluation   Total Treatment Time   SLP Time Spent Yes  "  SLP Oral Pharyngeal Evaluation (Mins) 21   Vitals   O2 (LPM) 6   O2 Delivery Device Oxymask   Pain 0 - 10 Group   Therapist Pain Assessment Post Activity Pain Same as Prior to Activity   Prior Living Situation   Prior Services None   Lives with - Patient's Self Care Capacity Spouse;Adult Children   Prior Level Of Function   Communication Within Functional Limits   Swallow Within Functional Limits   Dentition Edentulous   Dentures Uppers;Lowers   Hearing Within Functional Limits for Evaluation   Hearing Aid None   Vision Within Functional Limits for Evaluation   Patient's Primary Language English   Patient / Family Goals   Patient / Family Goal #1 \"Can I have some milk?\"   Short Term Goals   Short Term Goal # 1 Pt will consume PU4/TN0 without overt signs of aspiration given modA.     "

## 2022-06-13 NOTE — ASSESSMENT & PLAN NOTE
New a fib seen at OSH, but not seen on EKG's and telemetry  Cardiology recommend stopping eliquis, eliquis stopped  Recommend cardiology outpatient follow up for cardiac monitoring.

## 2022-06-13 NOTE — CARE PLAN
The patient is Watcher - Medium risk of patient condition declining or worsening    Shift Goals  Clinical Goals: SBP<180  Patient Goals: drink water  Family Goals: updates    Progress made toward(s) clinical / shift goals:    Problem: Respiratory  Goal: Patient will achieve/maintain optimum respiratory ventilation and gas exchange  Outcome: Not Progressing  Note: Coarse crackles in lungs. O2 demands increased overnight and throughout the day at times.      Problem: Knowledge Deficit - Standard  Goal: Patient and family/care givers will demonstrate understanding of plan of care, disease process/condition, diagnostic tests and medications  6/12/2022 1815 by Yvette Fisher R.N.  Outcome: Progressing  Note: Family at bedside actively engaging in care today.   6/12/2022 1814 by Yvette Fisher R.N.  Outcome: Progressing  Note: Family at bedside today actively engaged in care.      Problem: Diabetes Management  Goal: Patient will achieve and maintain glucose in satisfactory range  Outcome: Progressing  Note: Patient's glucose in 200-300's range but being managed with sliding scale and long acting.        Patient is not progressing towards the following goals:      Problem: Respiratory  Goal: Patient will achieve/maintain optimum respiratory ventilation and gas exchange  Outcome: Not Progressing  Note: Coarse crackles in lungs. O2 demands increased overnight and throughout the day at times.

## 2022-06-13 NOTE — CONSULTS
Cardiology Consult Note:    Jericho Ramírez M.D.  Date & Time note created:    6/13/2022   4:02 PM     Referring MD:  Dr. Sommer    Patient ID:   Name:             Asmita Willis   YOB: 1955  Age:                 67 y.o.  female   MRN:               [id number]                                                             Reason for Consult:      Pos trop    History of Present Illness:    67-year-old female with a past medical history of diabetes morbid obesity and hypertension h was admitted to the outside hospital with symptoms of shortness of breath.  She was found to be in A. fib with RVR with a heart rate in the 130s with some ST depressions.  She was also noted to have an elevated white blood cell count and was hypotensive.  She received fluid fluids and was deemed to have sepsis from pneumonia.  She was arrived here and intubated for airway protection.  She was also found to be in DKA.  She has been now medically stabilized and transferred to the medical floors.  Of note patient had a troponin that became elevated.  She is asymptomatic with this.  Her echocardiogram was essentially normal.    Review of Systems:      Constitutional: Denies fevers, Denies weight changes  Eyes: Denies changes in vision, no eye pain  Ears/Nose/Throat/Mouth: Denies nasal congestion or sore throat   Cardiovascular: no chest pain, no palpitations   Respiratory: no shortness of breath , Denies cough  Gastrointestinal/Hepatic: Denies abdominal pain, nausea, vomiting, diarrhea, constipation or GI bleeding   Genitourinary: Denies dysuria or frequency  Musculoskeletal/Rheum: Denies  joint pain and swelling, no edema  Skin: Denies rash  Neurological: Denies headache, confusion, memory loss or focal weakness/parasthesias  Psychiatric: denies mood disorder   Endocrine: Selena thyroid problems  Heme/Oncology/Lymph Nodes: Denies enlarged lymph nodes, denies brusing or known bleeding disorder  All other systems were  reviewed and are negative (AMA/CMS criteria)                Past Medical History:   Past Medical History:   Diagnosis Date   • Diabetes    • Hypertension    • Seizure disorder ([facility])      Active Hospital Problems    Diagnosis    • Hypokalemia [E87.6]    • NSTEMI (non-ST elevated myocardial infarction) ([facility]) [I21.4]    • Pericardial effusion [I31.3]    • Abnormal CT of the abdomen [R93.5]    • Uterine mass [N85.8]    • New onset atrial fibrillation ([facility]) [I48.91]    • DKA (diabetic ketoacidosis) ([facility]) [E11.10]    • Bilateral pneumonia [J18.9]    • Acute respiratory failure with hypoxemia ([facility]) [J96.01]    • Aspiration into lower respiratory tract [T17.800A]    • DM (diabetes mellitus) ([facility]) [E11.9]    • Acute renal failure (ARF) ([facility]) [N17.9]    • Hypertension [I10]    • Chest pain [R07.9]    • Sepsis ([facility]) [A41.9]    • Hematemesis [K92.0]    • DKA, type 1, not at goal ([facility]) [E10.10]        Past Surgical History:  No past surgical history on file.    Hospital Medications:  Current Facility-Administered Medications   Medication Dose   • [START ON 6/14/2022] doxycycline monohydrate (ADOXA) tablet 100 mg  100 mg   • potassium chloride (KCL) ivpb 10 mEq  10 mEq   • [START ON 6/14/2022] ampicillin/sulbactam (UNASYN) 3 g in  mL IVPB  3 g   • insulin GLARGINE (Lantus,Semglee) injection  27 Units    And   • insulin lispro (AdmeLOG,HumaLOG) injection  2-9 Units    And   • dextrose 10 % BOLUS 25 g  25 g   • metoprolol tartrate (LOPRESSOR) tablet 12.5 mg  12.5 mg   • LORazepam (ATIVAN) injection 0.5 mg  0.5 mg   • hydrALAZINE (APRESOLINE) injection 5 mg  5 mg   • atorvastatin (LIPITOR) tablet 40 mg  40 mg   • senna-docusate (PERICOLACE or SENOKOT S) 8.6-50 MG per tablet 2 Tablet  2 Tablet    And   • polyethylene glycol/lytes (MIRALAX) PACKET 1 Packet  1 Packet    And   • magnesium hydroxide (MILK OF MAGNESIA) suspension 30 mL  30 mL    And   • bisacodyl (DULCOLAX) suppository 10 mg  10 mg   • acetaminophen (Tylenol) tablet  "650 mg  650 mg   • ondansetron (ZOFRAN) syringe/vial injection 4 mg  4 mg   • ondansetron (ZOFRAN ODT) dispertab 4 mg  4 mg   • pantoprazole (Protonix) injection 40 mg  40 mg         Current Outpatient Medications:  Medications Prior to Admission   Medication Sig Dispense Refill Last Dose   • metoclopramide (REGLAN) 10 MG Tab Take 1 Tablet by mouth 4 times a day as needed. 30 Tablet 3    • omeprazole (PRILOSEC) 40 MG delayed-release capsule Take 1 Capsule by mouth every day. 30 Capsule 3    • hydrocodone-acetaminophen (VICODIN) 5-500 MG TABS Take 1-2 Tabs by mouth every four hours as needed for 20 doses. 20 Each 0        Medication Allergy:  Allergies   Allergen Reactions   • Other Drug      Pt states she is allergic to \"ALL\" -javad like lidocaine       Family History:  No family history on file.    Social History:  Social History     Socioeconomic History   • Marital status:      Spouse name: Not on file   • Number of children: Not on file   • Years of education: Not on file   • Highest education level: Not on file   Occupational History   • Not on file   Tobacco Use   • Smoking status: Current Every Day Smoker   • Smokeless tobacco: Not on file   Substance and Sexual Activity   • Alcohol use: Yes   • Drug use: No   • Sexual activity: Not on file   Other Topics Concern   • Not on file   Social History Narrative   • Not on file     Social Determinants of Health     Financial Resource Strain: Not on file   Food Insecurity: Not on file   Transportation Needs: Not on file   Physical Activity: Not on file   Stress: Not on file   Social Connections: Not on file   Intimate Partner Violence: Not on file   Housing Stability: Not on file         Physical Exam:  Vitals/ General Appearance:   Weight/BMI: Body mass index is 37.47 kg/m².  BP (!) 183/69   Pulse 82   Temp 37.9 °C (100.2 °F) (Bladder)   Resp (!) 22   Ht 1.676 m (5' 6\")   Wt 105 kg (232 lb 2.3 oz)   SpO2 94%   Vitals:    06/13/22 0852 06/13/22 1000 " 06/13/22 1200 06/13/22 1459   BP: (!) 155/67 (!) 159/75  (!) 183/69   Pulse: 84 79 80 82   Resp: (!) 35 (!) 21 (!) 22    Temp:   37.9 °C (100.2 °F)    TempSrc:   Bladder    SpO2: 96% 96% 94%    Weight:       Height:         Oxygen Therapy:  Pulse Oximetry: 94 %, O2 (LPM): 2, O2 Delivery Device: Nasal Cannula    Constitutional:   Well developed, Well nourished, No acute distress  HENMT:  Normocephalic, Atraumatic, Oropharynx moist mucous membranes, No oral exudates, Nose normal.  No thyromegaly.  Eyes:  EOMI, Conjunctiva normal, No discharge.  Neck:  Normal range of motion, No cervical tenderness,  no JVD.  Cardiovascular:  Normal heart rate, Normal rhythm, No murmurs, No rubs, No gallops.   Extremitites with intact distal pulses, no cyanosis, or edema.  Lungs:  Normal breath sounds, breath sounds clear to auscultation bilaterally,  no crackles, no wheezing.   Abdomen: Bowel sounds normal, Soft, No tenderness, No guarding, No rebound, No masses, No hepatosplenomegaly.  Skin: Warm, Dry, No erythema, No rash, no induration.  Neurologic: Alert & oriented x 3, No focal deficits noted, cranial nerves II through X are grossly intact.  Psychiatric: Affect normal, Judgment normal, Mood normal.      MDM (Data Review):     Records reviewed and summarized in current documentation    Lab Data Review:  Recent Results (from the past 24 hour(s))   POCT glucose device results    Collection Time: 06/12/22  5:36 PM   Result Value Ref Range    POC Glucose, Blood 296 (H) 65 - 99 mg/dL   HEMOGLOBIN AND HEMATOCRIT    Collection Time: 06/12/22  5:50 PM   Result Value Ref Range    Hemoglobin 10.3 (L) 12.0 - 16.0 g/dL    Hematocrit 32.0 (L) 37.0 - 47.0 %   HEMOGLOBIN AND HEMATOCRIT    Collection Time: 06/12/22 11:02 PM   Result Value Ref Range    Hemoglobin 10.3 (L) 12.0 - 16.0 g/dL    Hematocrit 32.0 (L) 37.0 - 47.0 %   POCT glucose device results    Collection Time: 06/12/22 11:05 PM   Result Value Ref Range    POC Glucose, Blood 258 (H)  65 - 99 mg/dL   BASIC METABOLIC PANEL    Collection Time: 06/13/22  5:20 AM   Result Value Ref Range    Sodium 146 (H) 135 - 145 mmol/L    Potassium 3.3 (L) 3.6 - 5.5 mmol/L    Chloride 110 96 - 112 mmol/L    Co2 24 20 - 33 mmol/L    Glucose 273 (H) 65 - 99 mg/dL    Bun 30 (H) 8 - 22 mg/dL    Creatinine 1.35 0.50 - 1.40 mg/dL    Calcium 8.8 8.4 - 10.2 mg/dL    Anion Gap 12.0 7.0 - 16.0   HEMOGLOBIN AND HEMATOCRIT    Collection Time: 06/13/22  5:20 AM   Result Value Ref Range    Hemoglobin 10.4 (L) 12.0 - 16.0 g/dL    Hematocrit 32.7 (L) 37.0 - 47.0 %   ESTIMATED GFR    Collection Time: 06/13/22  5:20 AM   Result Value Ref Range    GFR (CKD-EPI) 43 (A) >60 mL/min/1.73 m 2   POCT glucose device results    Collection Time: 06/13/22  5:30 AM   Result Value Ref Range    POC Glucose, Blood 272 (H) 65 - 99 mg/dL   proBrain Natriuretic Peptide, NT    Collection Time: 06/13/22 10:51 AM   Result Value Ref Range    NT-proBNP 30149 (H) 0 - 125 pg/mL   CBC WITH DIFFERENTIAL    Collection Time: 06/13/22 10:51 AM   Result Value Ref Range    WBC 20.5 (H) 4.8 - 10.8 K/uL    RBC 3.96 (L) 4.20 - 5.40 M/uL    Hemoglobin 10.7 (L) 12.0 - 16.0 g/dL    Hematocrit 33.9 (L) 37.0 - 47.0 %    MCV 85.6 81.4 - 97.8 fL    MCH 27.0 27.0 - 33.0 pg    MCHC 31.6 (L) 33.6 - 35.0 g/dL    RDW 49.3 35.9 - 50.0 fL    Platelet Count 259 164 - 446 K/uL    MPV 12.2 9.0 - 12.9 fL    Neutrophils-Polys 84.40 (H) 44.00 - 72.00 %    Lymphocytes 8.60 (L) 22.00 - 41.00 %    Monocytes 5.20 0.00 - 13.40 %    Eosinophils 0.00 0.00 - 6.90 %    Basophils 0.10 0.00 - 1.80 %    Immature Granulocytes 1.70 (H) 0.00 - 0.90 %    Nucleated RBC 0.00 /100 WBC    Neutrophils (Absolute) 17.24 (H) 2.00 - 7.15 K/uL    Lymphs (Absolute) 1.76 1.00 - 4.80 K/uL    Monos (Absolute) 1.07 (H) 0.00 - 0.85 K/uL    Eos (Absolute) 0.01 0.00 - 0.51 K/uL    Baso (Absolute) 0.03 0.00 - 0.12 K/uL    Immature Granulocytes (abs) 0.34 (H) 0.00 - 0.11 K/uL    NRBC (Absolute) 0.00 K/uL   POCT glucose  device results    Collection Time: 06/13/22 11:55 AM   Result Value Ref Range    POC Glucose, Blood 343 (H) 65 - 99 mg/dL   TROPONIN    Collection Time: 06/13/22  3:05 PM   Result Value Ref Range    Troponin T 280 (H) 6 - 19 ng/L       Imaging/Procedures Review:    Chest Xray:  Reviewed    EKG:   Dated 6/11/2022 personally reviewed inter myself showing normal sinus rhythm with possible inferior infarct age undetermined    ECHO:  Dated 6/11/2022 personally viewed inter myself showing normal LV systolic function no valvular heart disease.    MDM (Assessment and Plan):     Active Hospital Problems    Diagnosis    • Hypokalemia [E87.6]    • NSTEMI (non-ST elevated myocardial infarction) ([facility]) [I21.4]    • Pericardial effusion [I31.3]    • Abnormal CT of the abdomen [R93.5]    • Uterine mass [N85.8]    • New onset atrial fibrillation ([facility]) [I48.91]    • DKA (diabetic ketoacidosis) ([facility]) [E11.10]    • Bilateral pneumonia [J18.9]    • Acute respiratory failure with hypoxemia ([facility]) [J96.01]    • Aspiration into lower respiratory tract [T17.800A]    • DM (diabetes mellitus) ([facility]) [E11.9]    • Acute renal failure (ARF) ([facility]) [N17.9]    • Hypertension [I10]    • Chest pain [R07.9]    • Sepsis ([facility]) [A41.9]    • Hematemesis [K92.0]    • DKA, type 1, not at goal ([facility]) [E10.10]      67-year-old female with DKA in the setting of pneumonia longstanding type I to type 2 diabetes and a positive troponin.  It is possible that this is an NSTEMI however it is also possible it is a type II infarct.  I will see her more stable before pursuing further testing.  When she is more stable off oxygen then we can pursue a nuclear stress test for restratification.  If she has no significant ischemia then she will be cleared for her GI procedure.  For now given her debility I would not recommend a cardiac catheterization given the risks versus benefits.  I discussed this with the daughter and the patient and I both agree.

## 2022-06-13 NOTE — ASSESSMENT & PLAN NOTE
- Question of pancreatitis vs duodenitis vs normal age related variant  - Lipase normal  - ?? Duodenal ulcer as she also had hematemesis  - BID protonix  -EGD showed duodenitis, gastritis and esophagitis

## 2022-06-13 NOTE — ASSESSMENT & PLAN NOTE
This is Sepsis Present on admission  SIRS criteria identified on my evaluation include: Fever, with temperature greater than 101 deg F, Tachycardia, with heart rate greater than 90 BPM, Tachypnea, with respirations greater than 20 per minute and Leukocytosis, with WBC greater than 12,000  Source is Aspiration PNA  Sepsis protocol initiated  Fluid resuscitation ordered per protocol  Crystalloid Fluid Administration: Fluid resuscitation ordered per standard protocol - 30 mL/kg per current or ideal body weight  IV antibiotics as appropriate for source of sepsis  Reassessment: I have reassessed the patient's hemodynamic status    Sputum culture pending, blood cultures negative. Unasyn/Doxy as unclear currently if this is CAP or Aspiration PNA, and pt continues to spike fevers on just Rocephin/Azithro.

## 2022-06-13 NOTE — PROGRESS NOTES
Hospital Medicine Daily Progress Note    Date of Service  6/13/2022    Chief Complaint  Yaima Willis is a 67 y.o. female admitted 6/11/2022 with DKA    Hospital Course  This is a 68yo female with a histoy of chronic low back pain, diabetes, morbid obesity and HTN who presented to [facility] with generalized pain, chest pain, diarrhea and chills.  She was found to have new onset afib there along with sepsis and ATO. While awaiting transfer to [facility], she also had an episode of hematemesis.  She was admitted to our ICU and DKA resolved.     Interval Problem Update  6/13 - Discussed goals of care with pt and family, pt is DNR/DNI but does not want to pursue comfort care, would be ok with GI scoping if needed. SLP seeing pt this am as there was concern for aspiration as she failed her bedside swallow, but did pass for purees with them. She remains febrile today, will add Doxy for atypical and MRSA coverage as well.  Is on 6L oxymask. Clonidine patch given for HTN, now that pt can eat, will dc Cortrak and add PO Metoprolol.  Regarding the hematemesis PTA, hemoglobins remain stable - question of duodenitis on CT, may have a duodenal ulcer. Will discuss with GI.  Replace K+ IV today, check mag and phos. Pt continues to complain of chest pain, Dr Ramírez to see pt today, appreciated - avoiding ASA for now given her hematemesis and FOB positive status. Discussed wit Dr He, given stability of hemoglobin, plan for outpt scoping unless Cards wanted to perform LHC with possible stents, and then they would consider inpt scoping prior to LHC.     I have personally seen and examined the patient at bedside. I discussed the plan of care with patient, family, bedside RN and cardiology.    Consultants/Specialty  cardiology    Code Status  DNAR/DNI    Disposition  Patient is not medically cleared for discharge.   Anticipate discharge to to skilled nursing facility.  I have placed the appropriate orders for post-discharge  needs.    Review of Systems  Review of Systems   Constitutional: Positive for chills, fever and malaise/fatigue.   Respiratory: Positive for cough and shortness of breath.    Cardiovascular: Positive for chest pain. Negative for palpitations, orthopnea and leg swelling.   Gastrointestinal: Negative for abdominal pain, nausea and vomiting.   All other systems reviewed and are negative.       Physical Exam  Temp:  [37.9 °C (100.2 °F)-38.6 °C (101.5 °F)] 38.6 °C (101.5 °F)  Pulse:  [76-94] 84  Resp:  [5-63] 35  BP: (126-212)/(60-98) 155/67  SpO2:  [92 %-98 %] 96 %    Physical Exam  Vitals and nursing note reviewed.   Constitutional:       Appearance: She is obese. She is ill-appearing and toxic-appearing.   HENT:      Head: Normocephalic and atraumatic.      Mouth/Throat:      Mouth: Mucous membranes are moist.   Cardiovascular:      Rate and Rhythm: Normal rate and regular rhythm.      Heart sounds: No murmur heard.  Pulmonary:      Effort: No respiratory distress.      Breath sounds: Rales present.      Comments: Very mild increased work of breathing    Abdominal:      General: Abdomen is flat. Bowel sounds are normal. There is no distension.      Palpations: Abdomen is soft.      Tenderness: There is no abdominal tenderness. There is no guarding.   Musculoskeletal:         General: No swelling or deformity.   Skin:     General: Skin is warm and dry.      Coloration: Skin is not jaundiced.   Neurological:      General: No focal deficit present.      Mental Status: She is alert and oriented to person, place, and time. Mental status is at baseline.   Psychiatric:         Mood and Affect: Mood normal.         Behavior: Behavior normal.         Thought Content: Thought content normal.         Fluids    Intake/Output Summary (Last 24 hours) at 6/13/2022 0938  Last data filed at 6/13/2022 0800  Gross per 24 hour   Intake 765 ml   Output 3340 ml   Net -2575 ml       Laboratory  Recent Labs     06/11/22  1203 06/11/22  1927  06/11/22  2322 06/12/22  0550 06/12/22  1750 06/12/22  2302 06/13/22  0520   WBC 26.9*  --  22.3*  --   --   --   --    RBC 4.12*  --  3.80*  --   --   --   --    HEMOGLOBIN 11.2*   < > 10.4*   < > 10.3* 10.3* 10.4*   HEMATOCRIT 32.8*   < > 31.3*   < > 32.0* 32.0* 32.7*   MCV 79.6*  --  82.4  --   --   --   --    MCH 27.2  --  27.4  --   --   --   --    MCHC 34.1  --  33.2*  --   --   --   --    RDW 43.8  --  46.3  --   --   --   --    PLATELETCT 275  --  250  --   --   --   --    MPV 12.0  --  11.7  --   --   --   --     < > = values in this interval not displayed.     Recent Labs     06/11/22  1927 06/11/22  2322 06/13/22  0520   SODIUM 137 138 146*   POTASSIUM 3.4* 3.6 3.3*   CHLORIDE 104 106 110   CO2 24 23 24   GLUCOSE 195* 237* 273*   BUN 60* 55* 30*   CREATININE 1.61* 1.58* 1.35   CALCIUM 8.1* 8.3* 8.8             Recent Labs     06/11/22  1721   TRIGLYCERIDE 115   HDL 31*   LDL 47       Imaging  DX-ABDOMEN FOR TUBE PLACEMENT   Final Result      Nasogastric tube terminates within the gastric body.      CT-ABDOMEN-PELVIS W/O   Final Result      Moderate lower lobe consolidation on the right, peribronchial opacity on the left is worrisome for aspiration pneumonitis favored over pneumonia      Moderate pericardial effusion      Remote granulomatous disease      Subtle paraduodenal, peripancreatic fat stranding could indicate pancreatitis, duodenitis or may just be chronic, age related. Recommend correlation with pancreatic enzymes      Large intrauterine mass most likely is a leiomyoma but given the large size recommended gynecologic surgical referral as there is risk of sarcomatous degeneration when large      Benign right adrenal adenoma does not require follow-up      DX-CHEST-PORTABLE (1 VIEW)   Final Result      1.  Pulmonary vascular prominence with increased interstitial markings are suggestive of edema. There is likely a component of vessel crowding secondary to portable AP technique and low lung  volumes.      2.  Right internal jugular catheter is noted with the tip projecting over the superior vena cava. No pneumothorax is identified.      CT-HEAD W/O   Final Result      1.  No acute intracranial findings.      2.  Diffuse atrophy               EC-ECHOCARDIOGRAM COMPLETE W/O CONT   Final Result      DX-CHEST-PORTABLE (1 VIEW)   Final Result      Bibasilar pulmonary opacities, left greater than right, which could be related to atelectasis and/or pneumonitis. Small left pleural effusion.      OUTSIDE IMAGES-DX CHEST   Final Result      DX-CHEST-PORTABLE (1 VIEW)    (Results Pending)        Assessment/Plan  Acute respiratory failure with hypoxemia (HCC)  Assessment & Plan  - Secondary to bilateral PNA  - Continue antibiotics  - Recheck CXR pending, pt does not appear overloaded on exam    Bilateral pneumonia  Assessment & Plan  - Change Rocephin to Unasyn/Doxy, unclear if this is aspiration (more likely), or CAP, but pt still spiking fevers on Rocephin only   - Check MRSA nares     DKA (diabetic ketoacidosis) (HCC)  Assessment & Plan  - A1c 12.3  - Unknown home regimen - nursing calling pharmacy to reconcile, pt states she takes 70NPH and 15u regular BID   -  this am with 21u Semglee last evening - increase to 28u  - Daughter is going to [facility] to meet with diabetes educator as pt does need further education given her DKA     New onset atrial fibrillation (HCC)  Assessment & Plan  - Not anticoagulating due to FOB positive hematemesis   - Start oral metoprolol today    Uterine mass  Assessment & Plan  - Likely leiomyoma, will refer to Gyn Onc as an outpatient, I did let patient and family know of the finding    Abnormal CT of the abdomen  Assessment & Plan  - Question of pancreatitis vs duodenitis vs normal age related variant  - Lipase normal  - ?? Duodenal ulcer as she also had hematemesis  - BID protonix  - Call is out to [facility] to discuss medical management vs scoping    Pericardial effusion  Assessment  & Plan  - Seen on CT but not on echo   - No evidence of tamponade clinically       NSTEMI (non-ST elevated myocardial infarction) ([facility])  Assessment & Plan  - No anticoagulation currently given hematemesis, will optimize medically  - Start beta blocker today - if Bps and renal function stable in the am, start Losartan  - Consult placed to Dr Ramírez   - Peak trop of 724, trending down - recheck today given continued chest pain, but also may be referred from UGI issues and hematemesis    - Echo without WMA     Hypokalemia  Assessment & Plan  - Replace IV today, check mag and phos    Aspiration into lower respiratory tract  Assessment & Plan  - Failed bedside swallow with nursing, SLP eval ok for purees    Hematemesis  Assessment & Plan  - FOB positive  - Duodenitis seen on CT, suspect possible duodenal ulcer  - Discussed with Dr He, plan for outpatient scoping given stability of H/H unless Cards wants to pursue LHC with possible stenting, in which case we could reach out to him for inpt scoping in order to facilitate Plavix/ASA if needed   - BID PPI  - Hemoglobins are stable, stop q6hr H/H    Sepsis ([facility])  Assessment & Plan  This is Sepsis Present on admission  SIRS criteria identified on my evaluation include: Fever, with temperature greater than 101 deg F, Tachycardia, with heart rate greater than 90 BPM, Tachypnea, with respirations greater than 20 per minute and Leukocytosis, with WBC greater than 12,000  Source is Aspiration PNA  Sepsis protocol initiated  Fluid resuscitation ordered per protocol  Crystalloid Fluid Administration: Fluid resuscitation ordered per standard protocol - 30 mL/kg per current or ideal body weight  IV antibiotics as appropriate for source of sepsis  Reassessment: I have reassessed the patient's hemodynamic status    Sputum culture pending, blood cultures negative. Unasyn/Doxy as unclear currently if this is CAP or Aspiration PNA, and pt continues to spike fevers on just  Rocephin/Sandi.      Hypertension  Assessment & Plan  - Unknown home meds  - Start beta blocker for afib, add Losartan if renal function stable in the am    Acute renal failure (ARF) (HCC)  Assessment & Plan  - Pre renal due to sepsis, now resolved           VTE prophylaxis: SCDs/TEDs    I have performed a physical exam and reviewed and updated ROS and Plan today (6/13/2022). In review of yesterday's note (6/12/2022), there are no changes except as documented above.

## 2022-06-13 NOTE — ASSESSMENT & PLAN NOTE
- A1c 12.3  - Unknown home regimen - nursing calling pharmacy to reconcile, pt states she takes 70NPH and 15u regular BID   -  this am with 21u Semglee last evening - increase to 28u  - Daughter is going to [facility] to meet with diabetes educator as pt does need further education given her DKA

## 2022-06-13 NOTE — ASSESSMENT & PLAN NOTE
- Likely leiomyoma, will refer to Gyn Onc as an outpatient, I did let patient and family know of the finding

## 2022-06-13 NOTE — ASSESSMENT & PLAN NOTE
- FOB positive  - Duodenitis seen on CT, suspect possible duodenal ulcer  - GI: EGD showed esophagitis, gastritis and duodenitis, polyps and hemorrhoids  - PPI  - Hemoglobins are stable

## 2022-06-13 NOTE — CARE PLAN
The patient is     Shift Goals  Clinical Goals: safety  Patient Goals: safety  Family Goals: updates    Progress made toward(s) clinical / shift goals:  ongoing    Patient is not progressing towards the following goals:

## 2022-06-13 NOTE — PROGRESS NOTES
12-hour chart check complete.    Monitor Summary  Rhythm: SR  Rate: 76-84  Ectopy: r-o-fPAC,rPVC,rCoup  Measurements: 0.18/0.10/0.44
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
## 2022-06-13 NOTE — PROGRESS NOTES
Assumed pt care. Pt is alert and oriented X4 but forgetful and restless. Pt.denied any pain. Assessment completed.  Safety precautions in place. Call light within reach. Pt instructed to notify RN of any needs. Hourly rounding in place.

## 2022-06-13 NOTE — ASSESSMENT & PLAN NOTE
- No anticoagulation currently given hematemesis, will optimize medically  - Start beta blocker today - if Bps and renal function stable in the am, start Losartan  - Consult placed to Dr Ramírez   - Peak trop of 724, trending down - recheck today given continued chest pain, but also may be referred from UGI issues and hematemesis    - Echo without WMA   - Card :wants inpatient NPI work up

## 2022-06-14 LAB
ANION GAP SERPL CALC-SCNC: 8 MMOL/L (ref 7–16)
BASOPHILS # BLD AUTO: 0.2 % (ref 0–1.8)
BASOPHILS # BLD: 0.03 K/UL (ref 0–0.12)
BUN SERPL-MCNC: 21 MG/DL (ref 8–22)
CALCIUM SERPL-MCNC: 8.3 MG/DL (ref 8.4–10.2)
CHLORIDE SERPL-SCNC: 101 MMOL/L (ref 96–112)
CO2 SERPL-SCNC: 27 MMOL/L (ref 20–33)
CREAT SERPL-MCNC: 1.09 MG/DL (ref 0.5–1.4)
EKG IMPRESSION: NORMAL
EOSINOPHIL # BLD AUTO: 0.11 K/UL (ref 0–0.51)
EOSINOPHIL NFR BLD: 0.7 % (ref 0–6.9)
ERYTHROCYTE [DISTWIDTH] IN BLOOD BY AUTOMATED COUNT: 49.4 FL (ref 35.9–50)
GFR SERPLBLD CREATININE-BSD FMLA CKD-EPI: 56 ML/MIN/1.73 M 2
GLUCOSE BLD STRIP.AUTO-MCNC: 204 MG/DL (ref 65–99)
GLUCOSE BLD STRIP.AUTO-MCNC: 248 MG/DL (ref 65–99)
GLUCOSE BLD STRIP.AUTO-MCNC: 253 MG/DL (ref 65–99)
GLUCOSE BLD STRIP.AUTO-MCNC: 266 MG/DL (ref 65–99)
GLUCOSE SERPL-MCNC: 308 MG/DL (ref 65–99)
HCT VFR BLD AUTO: 32.7 % (ref 37–47)
HGB BLD-MCNC: 10.4 G/DL (ref 12–16)
IMM GRANULOCYTES # BLD AUTO: 0.15 K/UL (ref 0–0.11)
IMM GRANULOCYTES NFR BLD AUTO: 1 % (ref 0–0.9)
LYMPHOCYTES # BLD AUTO: 2.7 K/UL (ref 1–4.8)
LYMPHOCYTES NFR BLD: 18.3 % (ref 22–41)
MAGNESIUM SERPL-MCNC: 2 MG/DL (ref 1.5–2.5)
MCH RBC QN AUTO: 27.2 PG (ref 27–33)
MCHC RBC AUTO-ENTMCNC: 31.8 G/DL (ref 33.6–35)
MCV RBC AUTO: 85.4 FL (ref 81.4–97.8)
MONOCYTES # BLD AUTO: 0.89 K/UL (ref 0–0.85)
MONOCYTES NFR BLD AUTO: 6 % (ref 0–13.4)
NEUTROPHILS # BLD AUTO: 10.91 K/UL (ref 2–7.15)
NEUTROPHILS NFR BLD: 73.8 % (ref 44–72)
NRBC # BLD AUTO: 0 K/UL
NRBC BLD-RTO: 0 /100 WBC
PHOSPHATE SERPL-MCNC: 2.6 MG/DL (ref 2.5–4.5)
PLATELET # BLD AUTO: 232 K/UL (ref 164–446)
PMV BLD AUTO: 11.6 FL (ref 9–12.9)
POTASSIUM SERPL-SCNC: 3.6 MMOL/L (ref 3.6–5.5)
PROCALCITONIN SERPL-MCNC: 0.38 NG/ML
RBC # BLD AUTO: 3.83 M/UL (ref 4.2–5.4)
SARS-COV+SARS-COV-2 AG RESP QL IA.RAPID: NOTDETECTED
SODIUM SERPL-SCNC: 136 MMOL/L (ref 135–145)
SPECIMEN SOURCE: NORMAL
WBC # BLD AUTO: 14.8 K/UL (ref 4.8–10.8)

## 2022-06-14 PROCEDURE — 80048 BASIC METABOLIC PNL TOTAL CA: CPT

## 2022-06-14 PROCEDURE — 700102 HCHG RX REV CODE 250 W/ 637 OVERRIDE(OP): Performed by: INTERNAL MEDICINE

## 2022-06-14 PROCEDURE — 84145 PROCALCITONIN (PCT): CPT

## 2022-06-14 PROCEDURE — 94760 N-INVAS EAR/PLS OXIMETRY 1: CPT

## 2022-06-14 PROCEDURE — 97162 PT EVAL MOD COMPLEX 30 MIN: CPT

## 2022-06-14 PROCEDURE — 700101 HCHG RX REV CODE 250: Performed by: INTERNAL MEDICINE

## 2022-06-14 PROCEDURE — 82962 GLUCOSE BLOOD TEST: CPT

## 2022-06-14 PROCEDURE — A9270 NON-COVERED ITEM OR SERVICE: HCPCS | Performed by: INTERNAL MEDICINE

## 2022-06-14 PROCEDURE — C9113 INJ PANTOPRAZOLE SODIUM, VIA: HCPCS | Performed by: INTERNAL MEDICINE

## 2022-06-14 PROCEDURE — 83735 ASSAY OF MAGNESIUM: CPT

## 2022-06-14 PROCEDURE — 99233 SBSQ HOSP IP/OBS HIGH 50: CPT | Performed by: INTERNAL MEDICINE

## 2022-06-14 PROCEDURE — 770020 HCHG ROOM/CARE - TELE (206)

## 2022-06-14 PROCEDURE — 700102 HCHG RX REV CODE 250 W/ 637 OVERRIDE(OP): Performed by: FAMILY MEDICINE

## 2022-06-14 PROCEDURE — 93010 ELECTROCARDIOGRAM REPORT: CPT | Performed by: INTERNAL MEDICINE

## 2022-06-14 PROCEDURE — A9270 NON-COVERED ITEM OR SERVICE: HCPCS | Performed by: FAMILY MEDICINE

## 2022-06-14 PROCEDURE — 700105 HCHG RX REV CODE 258: Performed by: FAMILY MEDICINE

## 2022-06-14 PROCEDURE — 700111 HCHG RX REV CODE 636 W/ 250 OVERRIDE (IP): Performed by: INTERNAL MEDICINE

## 2022-06-14 PROCEDURE — 93005 ELECTROCARDIOGRAM TRACING: CPT | Performed by: INTERNAL MEDICINE

## 2022-06-14 PROCEDURE — 84100 ASSAY OF PHOSPHORUS: CPT

## 2022-06-14 PROCEDURE — 700111 HCHG RX REV CODE 636 W/ 250 OVERRIDE (IP): Performed by: FAMILY MEDICINE

## 2022-06-14 PROCEDURE — 85025 COMPLETE CBC W/AUTO DIFF WBC: CPT

## 2022-06-14 PROCEDURE — 87426 SARSCOV CORONAVIRUS AG IA: CPT

## 2022-06-14 RX ORDER — GABAPENTIN 300 MG/1
300 CAPSULE ORAL
COMMUNITY
End: 2022-10-30

## 2022-06-14 RX ORDER — PRAZOSIN HYDROCHLORIDE 1 MG/1
1 CAPSULE ORAL
COMMUNITY
End: 2022-10-30

## 2022-06-14 RX ORDER — PANTOPRAZOLE SODIUM 40 MG/1
40 TABLET, DELAYED RELEASE ORAL DAILY
Status: ON HOLD | COMMUNITY
End: 2022-06-18 | Stop reason: SDUPTHER

## 2022-06-14 RX ORDER — ONDANSETRON 4 MG/1
4 TABLET, ORALLY DISINTEGRATING ORAL PRN
COMMUNITY
End: 2022-10-30

## 2022-06-14 RX ORDER — TIZANIDINE 4 MG/1
4 TABLET ORAL PRN
COMMUNITY
End: 2022-10-30

## 2022-06-14 RX ORDER — HYDROXYZINE HYDROCHLORIDE 25 MG/1
25 TABLET, FILM COATED ORAL
Status: ON HOLD | COMMUNITY
End: 2022-06-18

## 2022-06-14 RX ORDER — MIRTAZAPINE 15 MG/1
15 TABLET, FILM COATED ORAL NIGHTLY
COMMUNITY
End: 2022-10-30

## 2022-06-14 RX ORDER — BUSPIRONE HYDROCHLORIDE 10 MG/1
10 TABLET ORAL 2 TIMES DAILY
COMMUNITY
End: 2022-10-30

## 2022-06-14 RX ORDER — LISINOPRIL 2.5 MG/1
2.5 TABLET ORAL
Status: DISCONTINUED | OUTPATIENT
Start: 2022-06-14 | End: 2022-06-17

## 2022-06-14 RX ORDER — SUCRALFATE ORAL 1 G/10ML
10 SUSPENSION ORAL PRN
COMMUNITY
End: 2022-10-30

## 2022-06-14 RX ORDER — ATORVASTATIN CALCIUM 10 MG/1
10 TABLET, FILM COATED ORAL NIGHTLY
COMMUNITY
End: 2022-10-30

## 2022-06-14 RX ORDER — AMLODIPINE BESYLATE 10 MG/1
10 TABLET ORAL DAILY
COMMUNITY
End: 2022-10-30

## 2022-06-14 RX ORDER — PROCHLORPERAZINE 25 MG
25 SUPPOSITORY, RECTAL RECTAL PRN
COMMUNITY
End: 2022-10-30

## 2022-06-14 RX ADMIN — INSULIN LISPRO 5 UNITS: 100 INJECTION, SOLUTION INTRAVENOUS; SUBCUTANEOUS at 17:50

## 2022-06-14 RX ADMIN — DOXYCYCLINE 100 MG: 100 TABLET, FILM COATED ORAL at 17:37

## 2022-06-14 RX ADMIN — PANTOPRAZOLE SODIUM 40 MG: 40 INJECTION, POWDER, FOR SOLUTION INTRAVENOUS at 05:35

## 2022-06-14 RX ADMIN — METOPROLOL TARTRATE 12.5 MG: 25 TABLET, FILM COATED ORAL at 17:37

## 2022-06-14 RX ADMIN — INSULIN LISPRO 3 UNITS: 100 INJECTION, SOLUTION INTRAVENOUS; SUBCUTANEOUS at 11:51

## 2022-06-14 RX ADMIN — PANTOPRAZOLE SODIUM 40 MG: 40 INJECTION, POWDER, FOR SOLUTION INTRAVENOUS at 17:37

## 2022-06-14 RX ADMIN — INSULIN LISPRO 5 UNITS: 100 INJECTION, SOLUTION INTRAVENOUS; SUBCUTANEOUS at 11:50

## 2022-06-14 RX ADMIN — METOPROLOL TARTRATE 12.5 MG: 25 TABLET, FILM COATED ORAL at 05:47

## 2022-06-14 RX ADMIN — INSULIN LISPRO 5 UNITS: 100 INJECTION, SOLUTION INTRAVENOUS; SUBCUTANEOUS at 05:50

## 2022-06-14 RX ADMIN — SENNOSIDES AND DOCUSATE SODIUM 2 TABLET: 50; 8.6 TABLET ORAL at 05:34

## 2022-06-14 RX ADMIN — POLYETHYLENE GLYCOL 3350, SODIUM SULFATE ANHYDROUS, SODIUM BICARBONATE, SODIUM CHLORIDE, POTASSIUM CHLORIDE 4 L: 236; 22.74; 6.74; 5.86; 2.97 POWDER, FOR SOLUTION ORAL at 15:40

## 2022-06-14 RX ADMIN — ONDANSETRON 4 MG: 2 INJECTION INTRAMUSCULAR; INTRAVENOUS at 03:34

## 2022-06-14 RX ADMIN — SODIUM CHLORIDE 3 G: 900 INJECTION INTRAVENOUS at 11:46

## 2022-06-14 RX ADMIN — SODIUM CHLORIDE 3 G: 900 INJECTION INTRAVENOUS at 05:35

## 2022-06-14 RX ADMIN — ONDANSETRON 4 MG: 2 INJECTION INTRAMUSCULAR; INTRAVENOUS at 12:14

## 2022-06-14 RX ADMIN — ACETAMINOPHEN 650 MG: 325 TABLET, FILM COATED ORAL at 12:04

## 2022-06-14 RX ADMIN — ATORVASTATIN CALCIUM 40 MG: 40 TABLET, FILM COATED ORAL at 17:37

## 2022-06-14 RX ADMIN — SODIUM CHLORIDE 3 G: 900 INJECTION INTRAVENOUS at 17:38

## 2022-06-14 RX ADMIN — LISINOPRIL 2.5 MG: 2.5 TABLET ORAL at 08:28

## 2022-06-14 RX ADMIN — ONDANSETRON 4 MG: 2 INJECTION INTRAMUSCULAR; INTRAVENOUS at 21:24

## 2022-06-14 RX ADMIN — INSULIN LISPRO 5 UNITS: 100 INJECTION, SOLUTION INTRAVENOUS; SUBCUTANEOUS at 07:46

## 2022-06-14 RX ADMIN — DOXYCYCLINE 100 MG: 100 TABLET, FILM COATED ORAL at 05:34

## 2022-06-14 RX ADMIN — SODIUM CHLORIDE 3 G: 900 INJECTION INTRAVENOUS at 23:51

## 2022-06-14 ASSESSMENT — GAIT ASSESSMENTS: GAIT LEVEL OF ASSIST: UNABLE TO PARTICIPATE

## 2022-06-14 ASSESSMENT — ENCOUNTER SYMPTOMS
SEIZURES: 0
SORE THROAT: 0
ABDOMINAL PAIN: 0
NECK PAIN: 0
ORTHOPNEA: 0
PALPITATIONS: 0
DIZZINESS: 0
CONSTIPATION: 0
SPUTUM PRODUCTION: 0
BLOOD IN STOOL: 0
CHILLS: 1
BACK PAIN: 0
EYE DISCHARGE: 0
NAUSEA: 0
COUGH: 1
WHEEZING: 0
HEARTBURN: 0
SHORTNESS OF BREATH: 1
CLAUDICATION: 0
VOMITING: 0
STRIDOR: 0
SINUS PAIN: 0
HEADACHES: 0
EYE PAIN: 0
DIARRHEA: 0
WEIGHT LOSS: 0
EYE REDNESS: 0
BLURRED VISION: 0
FOCAL WEAKNESS: 0
FEVER: 1
MYALGIAS: 0

## 2022-06-14 ASSESSMENT — COGNITIVE AND FUNCTIONAL STATUS - GENERAL
MOVING TO AND FROM BED TO CHAIR: A LOT
WALKING IN HOSPITAL ROOM: TOTAL
HELP NEEDED FOR BATHING: A LOT
EATING MEALS: A LOT
SUGGESTED CMS G CODE MODIFIER MOBILITY: CL
TOILETING: A LOT
MOBILITY SCORE: 10
DAILY ACTIVITIY SCORE: 12
DRESSING REGULAR LOWER BODY CLOTHING: A LOT
WALKING IN HOSPITAL ROOM: TOTAL
STANDING UP FROM CHAIR USING ARMS: A LOT
TURNING FROM BACK TO SIDE WHILE IN FLAT BAD: A LOT
SUGGESTED CMS G CODE MODIFIER DAILY ACTIVITY: CL
SUGGESTED CMS G CODE MODIFIER MOBILITY: CL
MOBILITY SCORE: 10
MOVING FROM LYING ON BACK TO SITTING ON SIDE OF FLAT BED: A LOT
MOVING FROM LYING ON BACK TO SITTING ON SIDE OF FLAT BED: A LOT
PERSONAL GROOMING: A LOT
STANDING UP FROM CHAIR USING ARMS: A LOT
CLIMB 3 TO 5 STEPS WITH RAILING: TOTAL
MOVING TO AND FROM BED TO CHAIR: A LOT
DRESSING REGULAR UPPER BODY CLOTHING: A LOT
CLIMB 3 TO 5 STEPS WITH RAILING: TOTAL
TURNING FROM BACK TO SIDE WHILE IN FLAT BAD: A LOT

## 2022-06-14 ASSESSMENT — PAIN DESCRIPTION - PAIN TYPE
TYPE: ACUTE PAIN

## 2022-06-14 NOTE — CARE PLAN
The patient is Watcher - Medium risk of patient condition declining or worsening    Shift Goals  Clinical Goals: systolic BP <180  Patient Goals: safety  Family Goals: updates    Progress made toward(s) clinical / shift goals:    Problem: Knowledge Deficit - Standard  Goal: Patient and family/care givers will demonstrate understanding of plan of care, disease process/condition, diagnostic tests and medications  Outcome: Progressing     Problem: Pain - Standard  Goal: Alleviation of pain or a reduction in pain to the patient’s comfort goal  Outcome: Progressing     Problem: Skin Integrity  Goal: Skin integrity is maintained or improved  Outcome: Progressing     Problem: Fall Risk  Goal: Patient will remain free from falls  Outcome: Progressing     Problem: Diabetes Management  Goal: Patient will achieve and maintain glucose in satisfactory range  Outcome: Progressing     Problem: Knowledge Deficit - Diabetes  Goal: Patient will demonstrate knowledge of insulin injection, symptoms, and treatment of hypoglycemia and diet prior to discharge  Outcome: Progressing     Problem: Discharge Planning - Diabetes  Goal: Patient's continuum of care needs will be met  Outcome: Progressing     Problem: Skin Integrity - Diabetes  Goal: Patient's skin on legs and feet will remain intact while hospitalized  Outcome: Progressing     Problem: Infection - Diabetes  Goal: Patient will remain free from signs and symptoms of infection  Outcome: Progressing  Goal: Promotion wound healing, line and drain management  Outcome: Progressing     Problem: Respiratory  Goal: Patient will achieve/maintain optimum respiratory ventilation and gas exchange  Outcome: Progressing     Problem: Fluid Balance or Risk for Fluid Volume Deficit  Goal: Patient will demonstrate adequate hydration and vital signs  Outcome: Progressing     Problem: Nutrition Deficit - Diabetes  Goal: Patient will demonstrate adequate hydration and vital signs  Outcome:  Progressing     Problem: Diabetic Ulcer  Goal: Early identification of diabetic foot wound and initiation of appropriate interventions  Outcome: Progressing     Problem: Risk for Fluid Volume Deficit Related to Bleeding  Goal: Fluid volume balance will be maintained  Outcome: Progressing  Goal: Patient will show no signs and symptoms of excessive bleeding  Outcome: Progressing     Problem: Risk for Bleeding  Goal: Patient will take measures to prevent bleeding and recognizes signs of bleeding that need to be reported immediately to a health care professional  Outcome: Progressing  Goal: Patient will not experience bleeding as evidenced by normal blood pressure, stable hematocrit and hemoglobin levels and desired ranges for coagulation profiles  Outcome: Progressing     Problem: Safety - Medical Restraint  Goal: Remains free of injury from restraints (Restraint for Interference with Medical Device)  Outcome: Progressing  Goal: Free from restraint(s) (Restraint for Interference with Medical Device)  Outcome: Progressing     Patient is not progressing towards the following goals:

## 2022-06-14 NOTE — PROGRESS NOTES
12-hour chart check complete.    Monitor Summary  Rhythm: SR  Rate: 64-90  Ectopy: rPVC  Measurements: .16/.08/.44

## 2022-06-14 NOTE — PROGRESS NOTES
Interval: No acute events. No recurrent hematemesis.     Medications / Drug list prior to admission:  No current facility-administered medications on file prior to encounter.     Current Outpatient Medications on File Prior to Encounter   Medication Sig Dispense Refill   • metoclopramide (REGLAN) 10 MG Tab Take 1 Tablet by mouth 4 times a day as needed. 30 Tablet 3   • omeprazole (PRILOSEC) 40 MG delayed-release capsule Take 1 Capsule by mouth every day. 30 Capsule 3   • hydrocodone-acetaminophen (VICODIN) 5-500 MG TABS Take 1-2 Tabs by mouth every four hours as needed for 20 doses. 20 Each 0       Current list of administered Medications:    Current Facility-Administered Medications:   •  ampicillin/sulbactam (UNASYN) 3 g in  mL IVPB, 3 g, Intravenous, Q6HRS, Fabiola Sommer M.D., Stopped at 06/14/22 0605  •  insulin GLARGINE (Lantus,Semglee) injection, 27 Units, Subcutaneous, Q EVENING, 27 Units at 06/13/22 1755 **AND** insulin lispro (AdmeLOG,HumaLOG) injection, 2-9 Units, Subcutaneous, Q6HRS, 5 Units at 06/14/22 0550 **AND** POC blood glucose manual result, , , Q6H **AND** NOTIFY MD and PharmD, , , Once **AND** Administer 20 grams of glucose (approximately 8 ounces of fruit juice) every 15 minutes PRN FSBG less than 70 mg/dL, , , PRN **AND** dextrose 10 % BOLUS 25 g, 25 g, Intravenous, Q15 MIN PRN, Fabiola Sommer M.D.  •  metoprolol tartrate (LOPRESSOR) tablet 12.5 mg, 12.5 mg, Oral, TWICE DAILY, Fabiola Sommer M.D., 12.5 mg at 06/14/22 0547  •  doxycycline monohydrate (ADOXA) tablet 100 mg, 100 mg, Oral, Q12HRS, Fabiola Sommer M.D., 100 mg at 06/14/22 0534  •  insulin lispro (AdmeLOG,HumaLOG) injection, 5 Units, Subcutaneous, TID AC, Fabiola Sommer M.D., 5 Units at 06/14/22 0746  •  LORazepam (ATIVAN) injection 0.5 mg, 0.5 mg, Intravenous, Q3HRS PRN, Herman Fields D.O., 0.5 mg at 06/13/22 0805  •  hydrALAZINE (APRESOLINE) injection 5 mg, 5 mg, Intravenous, Q4HRS PRN, Jacqueline Nicole M.D., 5 mg at  06/13/22 1405  •  atorvastatin (LIPITOR) tablet 40 mg, 40 mg, Enteral Tube, Q EVENING, Jacqueline Nicole M.D., 40 mg at 06/13/22 1750  •  senna-docusate (PERICOLACE or SENOKOT S) 8.6-50 MG per tablet 2 Tablet, 2 Tablet, Enteral Tube, BID, 2 Tablet at 06/14/22 0534 **AND** polyethylene glycol/lytes (MIRALAX) PACKET 1 Packet, 1 Packet, Enteral Tube, QDAY PRN **AND** magnesium hydroxide (MILK OF MAGNESIA) suspension 30 mL, 30 mL, Enteral Tube, QDAY PRN **AND** bisacodyl (DULCOLAX) suppository 10 mg, 10 mg, Rectal, QDAY PRN, Jacqueline Nicole M.D.  •  acetaminophen (Tylenol) tablet 650 mg, 650 mg, Enteral Tube, Q6HRS PRN, Jacqueline Nicole M.D., 650 mg at 06/13/22 0333  •  ondansetron (ZOFRAN) syringe/vial injection 4 mg, 4 mg, Intravenous, Q4HRS PRN, Jacqueline Nicole M.D., 4 mg at 06/14/22 0334  •  ondansetron (ZOFRAN ODT) dispertab 4 mg, 4 mg, Oral, Q4HRS PRN, Jacqueline Nicole M.D., 4 mg at 06/13/22 1459  •  pantoprazole (Protonix) injection 40 mg, 40 mg, Intravenous, BID, Jacqueline Nicole M.D., 40 mg at 06/14/22 0535    Past Medical History:   Diagnosis Date   • Diabetes    • Hypertension    • Seizure disorder (HCC)        No past surgical history on file.    No family history on file.  Patient family history was personally reviewed, no pertinent family history to current presentation    Social History     Socioeconomic History   • Marital status:      Spouse name: Not on file   • Number of children: Not on file   • Years of education: Not on file   • Highest education level: Not on file   Occupational History   • Not on file   Tobacco Use   • Smoking status: Current Every Day Smoker   • Smokeless tobacco: Not on file   Substance and Sexual Activity   • Alcohol use: Yes   • Drug use: No   • Sexual activity: Not on file   Other Topics Concern   • Not on file   Social History Narrative   • Not on file     Social Determinants of Health     Financial Resource Strain: Not on file   Food Insecurity: Not on file   Transportation Needs: Not on file  "  Physical Activity: Not on file   Stress: Not on file   Social Connections: Not on file   Intimate Partner Violence: Not on file   Housing Stability: Not on file       ALLERGIES:  Allergies   Allergen Reactions   • Other Drug      Pt states she is allergic to \"ALL\" -javad like lidocaine       Review of systems:  A complete review of symptoms was reviewed with patient. This is reviewed in H&P and PMH. ALL OTHERS reviewed and negative    Physical exam:  Patient Vitals for the past 24 hrs:   BP Temp Temp src Pulse Resp SpO2   06/14/22 0500 128/51 -- -- (!) 59 18 96 %   06/14/22 0400 128/51 -- -- 62 15 95 %   06/14/22 0000 (!) 179/61 -- -- 69 15 95 %   06/13/22 2300 -- -- -- 61 18 96 %   06/13/22 2200 -- -- -- 67 20 93 %   06/13/22 2000 (!) 96/36 -- -- 70 17 92 %   06/13/22 1600 (!) 158/64 37.8 °C (100 °F) Bladder 66 (!) 24 94 %   06/13/22 1500 (!) 186/87 -- -- 83 (!) 35 93 %   06/13/22 1459 (!) 183/69 -- -- 82 -- --   06/13/22 1400 -- -- -- 67 (!) 27 100 %   06/13/22 1200 (!) 186/67 37.9 °C (100.2 °F) Bladder 80 (!) 22 94 %   06/13/22 1000 (!) 159/75 -- -- 79 (!) 21 96 %   06/13/22 0852 (!) 155/67 -- -- 84 (!) 35 96 %   06/13/22 0800 (!) 212/80 (!) 38.6 °C (101.5 °F) Bladder 89 (!) 63 97 %     General: No acute distress.   EYES: no jaundice  HEENT: OP clear   Neck: No bruits No JVD.   CVS:  RRR. S1 + S2. No M/R/G. No edema.  Resp: CTAB. No wheezing or crackles/rhonchi.  Abdomen: Soft, NT, ND,  Skin: Grossly nothing acute no obvious rashes  Neurological: Alert, Moves all extremities, no cranial nerve defects on limited exam  Extremities: Pulse 2+ in b/l LE. No cyanosis.     Data:  Laboratory studies personally reviewed by me:  Recent Results (from the past 24 hour(s))   MRSA By PCR (Amp)    Collection Time: 06/13/22 10:51 AM    Specimen: Nares; Respirate   Result Value Ref Range    MRSA by PCR Negative Negative   proBrain Natriuretic Peptide, NT    Collection Time: 06/13/22 10:51 AM   Result Value Ref Range    " NT-proBNP 65009 (H) 0 - 125 pg/mL   CBC WITH DIFFERENTIAL    Collection Time: 06/13/22 10:51 AM   Result Value Ref Range    WBC 20.5 (H) 4.8 - 10.8 K/uL    RBC 3.96 (L) 4.20 - 5.40 M/uL    Hemoglobin 10.7 (L) 12.0 - 16.0 g/dL    Hematocrit 33.9 (L) 37.0 - 47.0 %    MCV 85.6 81.4 - 97.8 fL    MCH 27.0 27.0 - 33.0 pg    MCHC 31.6 (L) 33.6 - 35.0 g/dL    RDW 49.3 35.9 - 50.0 fL    Platelet Count 259 164 - 446 K/uL    MPV 12.2 9.0 - 12.9 fL    Neutrophils-Polys 84.40 (H) 44.00 - 72.00 %    Lymphocytes 8.60 (L) 22.00 - 41.00 %    Monocytes 5.20 0.00 - 13.40 %    Eosinophils 0.00 0.00 - 6.90 %    Basophils 0.10 0.00 - 1.80 %    Immature Granulocytes 1.70 (H) 0.00 - 0.90 %    Nucleated RBC 0.00 /100 WBC    Neutrophils (Absolute) 17.24 (H) 2.00 - 7.15 K/uL    Lymphs (Absolute) 1.76 1.00 - 4.80 K/uL    Monos (Absolute) 1.07 (H) 0.00 - 0.85 K/uL    Eos (Absolute) 0.01 0.00 - 0.51 K/uL    Baso (Absolute) 0.03 0.00 - 0.12 K/uL    Immature Granulocytes (abs) 0.34 (H) 0.00 - 0.11 K/uL    NRBC (Absolute) 0.00 K/uL   POCT glucose device results    Collection Time: 06/13/22 11:55 AM   Result Value Ref Range    POC Glucose, Blood 343 (H) 65 - 99 mg/dL   TROPONIN    Collection Time: 06/13/22  3:05 PM   Result Value Ref Range    Troponin T 280 (H) 6 - 19 ng/L   POCT glucose device results    Collection Time: 06/13/22  5:53 PM   Result Value Ref Range    POC Glucose, Blood 280 (H) 65 - 99 mg/dL   TROPONIN    Collection Time: 06/13/22  6:10 PM   Result Value Ref Range    Troponin T 271 (H) 6 - 19 ng/L   POCT glucose device results    Collection Time: 06/13/22 11:38 PM   Result Value Ref Range    POC Glucose, Blood 343 (H) 65 - 99 mg/dL   MAGNESIUM    Collection Time: 06/14/22  3:00 AM   Result Value Ref Range    Magnesium 2.0 1.5 - 2.5 mg/dL   PHOSPHORUS    Collection Time: 06/14/22  3:00 AM   Result Value Ref Range    Phosphorus 2.6 2.5 - 4.5 mg/dL   CBC WITH DIFFERENTIAL    Collection Time: 06/14/22  3:00 AM   Result Value Ref Range     WBC 14.8 (H) 4.8 - 10.8 K/uL    RBC 3.83 (L) 4.20 - 5.40 M/uL    Hemoglobin 10.4 (L) 12.0 - 16.0 g/dL    Hematocrit 32.7 (L) 37.0 - 47.0 %    MCV 85.4 81.4 - 97.8 fL    MCH 27.2 27.0 - 33.0 pg    MCHC 31.8 (L) 33.6 - 35.0 g/dL    RDW 49.4 35.9 - 50.0 fL    Platelet Count 232 164 - 446 K/uL    MPV 11.6 9.0 - 12.9 fL    Neutrophils-Polys 73.80 (H) 44.00 - 72.00 %    Lymphocytes 18.30 (L) 22.00 - 41.00 %    Monocytes 6.00 0.00 - 13.40 %    Eosinophils 0.70 0.00 - 6.90 %    Basophils 0.20 0.00 - 1.80 %    Immature Granulocytes 1.00 (H) 0.00 - 0.90 %    Nucleated RBC 0.00 /100 WBC    Neutrophils (Absolute) 10.91 (H) 2.00 - 7.15 K/uL    Lymphs (Absolute) 2.70 1.00 - 4.80 K/uL    Monos (Absolute) 0.89 (H) 0.00 - 0.85 K/uL    Eos (Absolute) 0.11 0.00 - 0.51 K/uL    Baso (Absolute) 0.03 0.00 - 0.12 K/uL    Immature Granulocytes (abs) 0.15 (H) 0.00 - 0.11 K/uL    NRBC (Absolute) 0.00 K/uL   Basic Metabolic Panel    Collection Time: 06/14/22  3:00 AM   Result Value Ref Range    Sodium 136 135 - 145 mmol/L    Potassium 3.6 3.6 - 5.5 mmol/L    Chloride 101 96 - 112 mmol/L    Co2 27 20 - 33 mmol/L    Glucose 308 (H) 65 - 99 mg/dL    Bun 21 8 - 22 mg/dL    Creatinine 1.09 0.50 - 1.40 mg/dL    Calcium 8.3 (L) 8.4 - 10.2 mg/dL    Anion Gap 8.0 7.0 - 16.0   PROCALCITONIN    Collection Time: 06/14/22  3:00 AM   Result Value Ref Range    Procalcitonin 0.38 (H) <0.25 ng/mL   ESTIMATED GFR    Collection Time: 06/14/22  3:00 AM   Result Value Ref Range    GFR (CKD-EPI) 56 (A) >60 mL/min/1.73 m 2   POCT glucose device results    Collection Time: 06/14/22  5:48 AM   Result Value Ref Range    POC Glucose, Blood 266 (H) 65 - 99 mg/dL       EKG 6/11/22 interpreted by me sinus, nonspecific st wave changes, artifact    All pertinent features of laboratory and imaging reviewed including primary images where applicable    TTE 6/11/22  CONCLUSIONS  Normal left ventricular systolic function.  Moderate concentric left ventricular hypertrophy.  Left  Ventricle  Normal left ventricular chamber size. Moderate concentric left   ventricular hypertrophy. Normal left ventricular systolic function. The   left ventricular ejection fraction is visually estimated to be 70%.   Normal regional wall motion. Indeterminate diastolic function.    Principal Problem:    DKA, type 1, not at goal ([facility]) POA: Yes  Active Problems:    DM (diabetes mellitus) ([facility]) POA: Unknown    Acute renal failure (ARF) ([facility]) POA: Unknown    Hypertension POA: Unknown    Chest pain POA: Unknown    Sepsis ([facility]) POA: Unknown    Hematemesis POA: Unknown    Aspiration into lower respiratory tract POA: Unknown    Hypokalemia POA: Unknown    NSTEMI (non-ST elevated myocardial infarction) ([facility]) POA: Unknown    Pericardial effusion POA: Unknown    Abnormal CT of the abdomen POA: Unknown    Uterine mass POA: Unknown    New onset atrial fibrillation ([facility]) POA: Unknown    DKA (diabetic ketoacidosis) ([facility]) POA: Unknown    Bilateral pneumonia POA: Unknown    Acute respiratory failure with hypoxemia ([facility]) POA: Unknown  Resolved Problems:    * No resolved hospital problems. *      Assessment / Plan:  67 year old woman with diabetes, obesity, hypertension presents with new onset atrial fibrillation with RVR and hypertensive emergency. Also found DKA. Consult for nstemi, deemed likely demand type 2. Stay c/b GIB.    -blood pressure control with ACE or ARB goal 120/80 at least  -when able from bleeding standpoint add doac for cva prevention. Additional p2y12 such as plavix would also be optimal from nstemi perspective unless contraindicated.  - - please coordinate with GI for inpatient endoscopy  - - please scan in strips/evidence of atrial fibrillation  -continue rate control with metoprolol  -repeat ekg today  -mpi spect nuclear pharm stress test when other medical conditions stabilized    I personally discussed her case with Dr Brock    It is my pleasure to participate in the care of Ms. Willis.  Please do not  hesitate to contact me with questions or concerns.    Duncan Garcia MD  Cardiologist [facility]

## 2022-06-14 NOTE — PROGRESS NOTES
Hospital Medicine Daily Progress Note    Date of Service  6/14/2022    Chief Complaint  Yaima Willis is a 67 y.o. female admitted 6/11/2022 with DKA    Hospital Course  This is a 68yo female with a histoy of chronic low back pain, diabetes, morbid obesity and HTN who presented to [facility] with generalized pain, chest pain, diarrhea and chills.  She was found to have new onset afib there along with sepsis and TAO. While awaiting transfer to [facility], she also had an episode of hematemesis.  She was admitted to our ICU and DKA resolved.     Interval Problem Update  6/13 - Discussed goals of care with pt and family, pt is DNR/DNI but does not want to pursue comfort care, would be ok with GI scoping if needed. SLP seeing pt this am as there was concern for aspiration as she failed her bedside swallow, but did pass for purees with them. She remains febrile today, will add Doxy for atypical and MRSA coverage as well.  Is on 6L oxymask. Clonidine patch given for HTN, now that pt can eat, will dc Cortrak and add PO Metoprolol.  Regarding the hematemesis PTA, hemoglobins remain stable - question of duodenitis on CT, may have a duodenal ulcer. Will discuss with GI.  Replace K+ IV today, check mag and phos. Pt continues to complain of chest pain, Dr Ramírez to see pt today, appreciated - avoiding ASA for now given her hematemesis and FOB positive status. Discussed wit Dr He, given stability of hemoglobin, plan for outpt scoping unless Cards wanted to perform LHC with possible stents, and then they would consider inpt scoping prior to LHC.     6/14: no acute event overnight. Per card: to coordinate with GI for endoscopy. Per GI to get EGD as outpatient.  hgb stable. Reached out to GI for inpatient EGD    I have personally seen and examined the patient at bedside. I discussed the plan of care with patient, family, bedside RN and cardiology.    Consultants/Specialty  cardiology    Code Status  DNAR/DNI    Disposition  Patient  is not medically cleared for discharge.   Anticipate discharge to to skilled nursing facility.  I have placed the appropriate orders for post-discharge needs.    Review of Systems  Review of Systems   Constitutional: Positive for chills, fever and malaise/fatigue. Negative for weight loss.   HENT: Negative for congestion, hearing loss, nosebleeds, sinus pain and sore throat.    Eyes: Negative for blurred vision, pain, discharge and redness.   Respiratory: Positive for cough and shortness of breath. Negative for sputum production, wheezing and stridor.    Cardiovascular: Negative for chest pain, palpitations, orthopnea, claudication and leg swelling.   Gastrointestinal: Negative for abdominal pain, blood in stool, constipation, diarrhea, heartburn, nausea and vomiting.   Genitourinary: Negative for dysuria, frequency, hematuria and urgency.   Musculoskeletal: Negative for back pain, myalgias and neck pain.   Skin: Negative for itching and rash.   Neurological: Negative for dizziness, focal weakness, seizures and headaches.   All other systems reviewed and are negative.       Physical Exam  Temp:  [37.1 °C (98.8 °F)-37.9 °C (100.2 °F)] 37.1 °C (98.8 °F)  Pulse:  [59-83] 60  Resp:  [15-35] 19  BP: ()/(36-87) 146/62  SpO2:  [92 %-100 %] 97 %    Physical Exam  Vitals and nursing note reviewed.   Constitutional:       General: She is not in acute distress.     Appearance: Normal appearance. She is obese. She is ill-appearing and toxic-appearing. She is not diaphoretic.   HENT:      Head: Normocephalic and atraumatic.      Right Ear: Tympanic membrane, ear canal and external ear normal.      Left Ear: Tympanic membrane, ear canal and external ear normal.      Nose: No congestion or rhinorrhea.      Mouth/Throat:      Mouth: Mucous membranes are moist.   Eyes:      Extraocular Movements: Extraocular movements intact.      Conjunctiva/sclera: Conjunctivae normal.      Pupils: Pupils are equal, round, and reactive to  light.   Cardiovascular:      Rate and Rhythm: Normal rate and regular rhythm.      Pulses: Normal pulses.      Heart sounds: Normal heart sounds. No murmur heard.    No friction rub. No gallop.   Pulmonary:      Effort: Pulmonary effort is normal. No respiratory distress.      Breath sounds: No stridor. Rales present. No wheezing or rhonchi.      Comments: Very mild increased work of breathing    Abdominal:      General: Abdomen is flat. Bowel sounds are normal. There is no distension.      Palpations: Abdomen is soft. There is no mass.      Tenderness: There is no abdominal tenderness. There is no guarding or rebound.      Hernia: No hernia is present.   Musculoskeletal:         General: No swelling, tenderness or deformity.      Cervical back: Normal range of motion and neck supple.   Skin:     General: Skin is warm and dry.      Coloration: Skin is not jaundiced.      Findings: No erythema.   Neurological:      General: No focal deficit present.      Mental Status: She is alert and oriented to person, place, and time. Mental status is at baseline.         Fluids    Intake/Output Summary (Last 24 hours) at 6/14/2022 1045  Last data filed at 6/14/2022 0800  Gross per 24 hour   Intake 495 ml   Output 3405 ml   Net -2910 ml       Laboratory  Recent Labs     06/11/22 2322 06/12/22  0550 06/13/22  0520 06/13/22  1051 06/14/22  0300   WBC 22.3*  --   --  20.5* 14.8*   RBC 3.80*  --   --  3.96* 3.83*   HEMOGLOBIN 10.4*   < > 10.4* 10.7* 10.4*   HEMATOCRIT 31.3*   < > 32.7* 33.9* 32.7*   MCV 82.4  --   --  85.6 85.4   MCH 27.4  --   --  27.0 27.2   MCHC 33.2*  --   --  31.6* 31.8*   RDW 46.3  --   --  49.3 49.4   PLATELETCT 250  --   --  259 232   MPV 11.7  --   --  12.2 11.6    < > = values in this interval not displayed.     Recent Labs     06/11/22 2322 06/13/22  0520 06/14/22  0300   SODIUM 138 146* 136   POTASSIUM 3.6 3.3* 3.6   CHLORIDE 106 110 101   CO2 23 24 27   GLUCOSE 237* 273* 308*   BUN 55* 30* 21    CREATININE 1.58* 1.35 1.09   CALCIUM 8.3* 8.8 8.3*             Recent Labs     06/11/22  1721   TRIGLYCERIDE 115   HDL 31*   LDL 47       Imaging  DX-CHEST-PORTABLE (1 VIEW)   Final Result      1.  No acute cardiopulmonary abnormality identified.      2.  Bilateral atelectasis, unchanged      3.  Right midlung zone granuloma      4.  Enlarged cardiac silhouette      5.  Right internal jugular catheter and enteric catheter appear appropriately located      DX-ABDOMEN FOR TUBE PLACEMENT   Final Result      Nasogastric tube terminates within the gastric body.      CT-ABDOMEN-PELVIS W/O   Final Result      Moderate lower lobe consolidation on the right, peribronchial opacity on the left is worrisome for aspiration pneumonitis favored over pneumonia      Moderate pericardial effusion      Remote granulomatous disease      Subtle paraduodenal, peripancreatic fat stranding could indicate pancreatitis, duodenitis or may just be chronic, age related. Recommend correlation with pancreatic enzymes      Large intrauterine mass most likely is a leiomyoma but given the large size recommended gynecologic surgical referral as there is risk of sarcomatous degeneration when large      Benign right adrenal adenoma does not require follow-up      DX-CHEST-PORTABLE (1 VIEW)   Final Result      1.  Pulmonary vascular prominence with increased interstitial markings are suggestive of edema. There is likely a component of vessel crowding secondary to portable AP technique and low lung volumes.      2.  Right internal jugular catheter is noted with the tip projecting over the superior vena cava. No pneumothorax is identified.      CT-HEAD W/O   Final Result      1.  No acute intracranial findings.      2.  Diffuse atrophy               EC-ECHOCARDIOGRAM COMPLETE W/O CONT   Final Result      DX-CHEST-PORTABLE (1 VIEW)   Final Result      Bibasilar pulmonary opacities, left greater than right, which could be related to atelectasis and/or  pneumonitis. Small left pleural effusion.      OUTSIDE IMAGES-DX CHEST   Final Result           Assessment/Plan  Acute respiratory failure with hypoxemia (HCC)  Assessment & Plan  - Secondary to bilateral PNA  - Continue antibiotics with unasyn and doxy      Bilateral pneumonia  Assessment & Plan  - Change Rocephin to Unasyn/Doxy, unclear if this is aspiration (more likely), or CAP, but pt still spiking fevers on Rocephin only   - Check MRSA nares : negative    DKA (diabetic ketoacidosis) ([facility])  Assessment & Plan  - A1c 12.3  - Unknown home regimen - nursing calling pharmacy to reconcile, pt states she takes 70NPH and 15u regular BID   -  this am with 21u Semglee last evening - increase to 28u  - Daughter is going to [facility] to meet with diabetes educator as pt does need further education given her DKA     New onset atrial fibrillation ([facility])  Assessment & Plan  - Not anticoagulating due to FOB positive hematemesis   - Start oral metoprolol today    Uterine mass  Assessment & Plan  - Likely leiomyoma, will refer to Gyn Onc as an outpatient, I did let patient and family know of the finding    Abnormal CT of the abdomen  Assessment & Plan  - Question of pancreatitis vs duodenitis vs normal age related variant  - Lipase normal  - ?? Duodenal ulcer as she also had hematemesis  - BID protonix  - GI to have EGD    Pericardial effusion  Assessment & Plan  - Seen on CT but not on echo   - No evidence of tamponade clinically       NSTEMI (non-ST elevated myocardial infarction) ([facility])  Assessment & Plan  - No anticoagulation currently given hematemesis, will optimize medically  - Start beta blocker today - if Bps and renal function stable in the am, start Losartan  - Consult placed to Dr Ramírez   - Peak trop of 724, trending down - recheck today given continued chest pain, but also may be referred from UGI issues and hematemesis    - Echo without WMA   - Card :wants inpatient NPI work up    Hypokalemia  Assessment & Plan  -  Replace IV today, check mag and phos    Aspiration into lower respiratory tract  Assessment & Plan  - Failed bedside swallow with nursing, SLP santyal ok for purees    Hematemesis  Assessment & Plan  - FOB positive  - Duodenitis seen on CT, suspect possible duodenal ulcer  - GI: EGD   - BID PPI  - Hemoglobins are stable, stop q6hr H/H    Sepsis ([facility])  Assessment & Plan  This is Sepsis Present on admission  SIRS criteria identified on my evaluation include: Fever, with temperature greater than 101 deg F, Tachycardia, with heart rate greater than 90 BPM, Tachypnea, with respirations greater than 20 per minute and Leukocytosis, with WBC greater than 12,000  Source is Aspiration PNA  Sepsis protocol initiated  Fluid resuscitation ordered per protocol  Crystalloid Fluid Administration: Fluid resuscitation ordered per standard protocol - 30 mL/kg per current or ideal body weight  IV antibiotics as appropriate for source of sepsis  Reassessment: I have reassessed the patient's hemodynamic status    Sputum culture pending, blood cultures negative. Unasyn/Doxy as unclear currently if this is CAP or Aspiration PNA, and pt continues to spike fevers on just Rocephin/Azithro.      Hypertension  Assessment & Plan  - Unknown home meds  - Start beta blocker for afib, add Losartan if renal function stable in the am    Acute renal failure (ARF) ([facility])  Assessment & Plan  - Pre renal due to sepsis, now resolved           VTE prophylaxis: SCDs/TEDs    I have performed a physical exam and reviewed and updated ROS and Plan today (6/14/2022). In review of yesterday's note (6/13/2022), there are no changes except as documented above.

## 2022-06-14 NOTE — PROGRESS NOTES
12-hour chart check complete.    Monitor Summary  Rhythm: SB- SR  Rate: 59-73  Ectopy: rPVC  Measurements: .10/.08/.44

## 2022-06-14 NOTE — CARE PLAN
The patient is Stable - Low risk of patient condition declining or worsening    Shift Goals  Clinical Goals: SBP <180  Patient Goals: eat ice cream  Family Goals: updates    Progress made toward(s) clinical / shift goals:  SBP < 180, patient will begin Katie prep    Patient is not progressing towards the following goals:      Problem: Knowledge Deficit - Standard  Goal: Patient and family/care givers will demonstrate understanding of plan of care, disease process/condition, diagnostic tests and medications  Outcome: Progressing  Note: Plan of care discussed with patient including EGD and colonoscopy tomorrow. Katie to prepare for procedure.     Problem: Pain - Standard  Goal: Alleviation of pain or a reduction in pain to the patient’s comfort goal  Outcome: Progressing  Note: Patient complains of generalized pain throughout body. Rest, reposition, and food used as nonpharm measures.      Problem: Skin Integrity  Goal: Skin integrity is maintained or improved  Outcome: Progressing

## 2022-06-14 NOTE — CONSULTS
Gastroenterology Consult Note     Date of Consult: 6/14/22  Referring Physician: Reece     Reason for consult: GI bleed     HPI: Mrs. Willis is a 68 y/o transferred from [facility] with chest pain, sepsis.  She was noted to have some coffee ground emesis, and CT scan reportedly demonstrated possible duodenitis.  Here at [facility], she has been having foul-smelling maroon-colored stools.  She has had some greenish emesis, but no bloody emesis in the past few days per RN.  Patient has elevated troponins, sepsis syndrome, TAO, sub-optimally controlled diabetes, and new-onset Afib.  Plans for anticoagulation once cleared by GI.        PMHX:  Past Medical History:   Diagnosis Date   • Diabetes    • Hypertension    • Seizure disorder (HCC)           PSurgHx: No past surgical history on file.     ALLERGIES:Other drug     SocHx:   Social History     Socioeconomic History   • Marital status:      Spouse name: Not on file   • Number of children: Not on file   • Years of education: Not on file   • Highest education level: Not on file   Occupational History   • Not on file   Tobacco Use   • Smoking status: Current Every Day Smoker   • Smokeless tobacco: Not on file   Substance and Sexual Activity   • Alcohol use: Yes   • Drug use: No   • Sexual activity: Not on file   Other Topics Concern   • Not on file   Social History Narrative   • Not on file     Social Determinants of Health     Financial Resource Strain: Not on file   Food Insecurity: Not on file   Transportation Needs: Not on file   Physical Activity: Not on file   Stress: Not on file   Social Connections: Not on file   Intimate Partner Violence: Not on file   Housing Stability: Not on file        FAMHx: No family history on file.     ROS:  Constitutional: No fevers, chills, no night sweats, no weight changes  HEENT: no vision or hearing changes, no dry mouth, no change in smell  CARDIO: no palpitations, no orthopnea, no  chest pain  PULM: no cough, no shortness of breath  NEURO: no Seizures, no memory impairment, no change in sensation  GI: as above  : no dysuria, no hematuria  HEME: no anemia, no easy brusing  MUSCULOSKELETAL: no muscle aches, no back pain, no arthritis  PSYCH: no anxiety or depression  SKIN: no rashes     PE:  Vitals:    06/14/22 0900 06/14/22 1000 06/14/22 1100 06/14/22 1200   BP: (!) 163/67   113/51   Pulse: (!) 58 62 (!) 55 61   Resp:   17 (!) 30   Temp:    37.1 °C (98.8 °F)   TempSrc:    Bladder   SpO2: 96% 95% 94% 94%   Weight:       Height:         Gen: AAOx3, NAD, lying in bed  HEENT: PERRL, EOMI, nares patent, Mucous membranes moist  Neck: supple, no cervical or supraclavicular adenopathy  CVS: regular rhythm, normal rate, no MRG  Pulm: CTAB, no crackles  Abd: soft, Nd, NT, no guarding or rebound  Ext: no edema, normal sensation  NEURO: grossly normal, no weakness  Skin: warm, no rash  Psych: flat affect     LABS:  Lab Results   Component Value Date/Time    SODIUM 136 06/14/2022 03:00 AM    POTASSIUM 3.6 06/14/2022 03:00 AM    CHLORIDE 101 06/14/2022 03:00 AM    CO2 27 06/14/2022 03:00 AM    GLUCOSE 308 (H) 06/14/2022 03:00 AM    BUN 21 06/14/2022 03:00 AM    CREATININE 1.09 06/14/2022 03:00 AM      Lab Results   Component Value Date/Time    WBC 14.8 (H) 06/14/2022 03:00 AM    RBC 3.83 (L) 06/14/2022 03:00 AM    HEMOGLOBIN 10.4 (L) 06/14/2022 03:00 AM    HEMATOCRIT 32.7 (L) 06/14/2022 03:00 AM    MCV 85.4 06/14/2022 03:00 AM    MCH 27.2 06/14/2022 03:00 AM    MCHC 31.8 (L) 06/14/2022 03:00 AM    MPV 11.6 06/14/2022 03:00 AM    NEUTSPOLYS 73.80 (H) 06/14/2022 03:00 AM    LYMPHOCYTES 18.30 (L) 06/14/2022 03:00 AM    MONOCYTES 6.00 06/14/2022 03:00 AM    EOSINOPHILS 0.70 06/14/2022 03:00 AM    BASOPHILS 0.20 06/14/2022 03:00 AM        No results found for: PROTHROMBTM, INR   Recent Labs     06/11/22  1203   ASTSGOT 21   ALTSGPT 8   TBILIRUBIN 0.3   GLOBULIN 3.0          Problem List Items Addressed This  Visit    None     Visit Diagnoses     Hypotension, unspecified hypotension type        Relevant Medications    hydrALAZINE (APRESOLINE) injection 5 mg (Completed)    hydrALAZINE (APRESOLINE) injection 5 mg    atorvastatin (LIPITOR) tablet 40 mg    metoprolol tartrate (LOPRESSOR) tablet 12.5 mg    lisinopril (PRINIVIL) tablet 2.5 mg    amLODIPine (NORVASC) 10 MG Tab    prazosin (MINIPRESS) 1 MG Cap    atorvastatin (LIPITOR) 10 MG Tab    Other Relevant Orders    Central Line Insertion (Completed)           ASSESSMENT:    1. GI bleeding, consider both UGI/LGI sources.    2. Anemia, stable  3. CAD, elevated troponins.  plans for anticoagulation.  4. Morbid obesity  5. Diabetes, suboptimal control  6. HTN  7. New onset Afib  8. Sepsis syndrome      PLAN:   1. Patient is high-risk for sedation/procedure, however given need for starting anticoagulation, will proceed with EGD and colonoscopy, scheduled tomorrow at 3PM.  2. IV PPI  3. Transfuse PRBC to keep hgb >7.      Thank you for this consult.     Yevgeniy Jo MD

## 2022-06-14 NOTE — PALLIATIVE CARE
Spiritual Care Note         Patient's Name: Asmita Willis   MRN: [id number]    YOB: 1955   Age and Gender: 67 y.o. female   Unit/Service Area:   ICU   Room (and Bed): 3317   Ethnicity or Nationality: white   Primary Language: English   Confucianism/Spiritual Preference: Denominational   Place of Residence: [place], [place]   Medical Diagnoses: acute respiratory failure with hypoxemia   bilateral pneumonia  DKA   new onset atrial fibrillation   uterine mass, likely leiomyoma  abnormal CT of the abdomen  pericardial effusion  NSTEMI   hypokalemia  aspiration into lower respiratory tract  hematemesis  -- duodenitis seen on CT  -- suspect possible duodenal ulcer  sepsis  hypertension   Code Status: DNAR/DNI    Date of Admission: 6/11/2022   Length of Stay: 3 days        Spiritual Screen Results  Is your spiritual health or inner well-being important to you as you cope with your medical condition?   Yes  Would you like to receive a visit from our Spiritual Care team or your own Jew or spiritual leader?   Yes  Palliative Care Spiritual Screen Comment:   Spiritual care order placed      Nature of the Visit:   Initial, On Shift    Continue Visiting:   Yes    General Visit:   Yes    Referral from/Origin of Request:   [facility] nursing order (palliative care)    Visited with:   Patient    Observations/Symptoms:   Patient laying in bed, alert    Interaction/Conversation:   Patient rquested prayer    Assessment:   Need    Need:   Seeking Spiritual Assistance and Support    Confucianism Needs:   Prayer    Intended Effects:   Demonstrate Caring and Concern, Establish Rapport and Connectedness, Melissa Affirmation    Interventions:   Compassionate Presence, Emotional Support, Prayer    Outcomes:   Spiritual Comfort, Progress with Trust    Plan:   Continue to follow      Spiritual Care Provider   Chaplain MARIA C Guzman  [phone]   [email or web address]

## 2022-06-14 NOTE — THERAPY
"Physical Therapy   Initial Evaluation     Patient Name: Asmita Willis  Age:  67 y.o., Sex:  female  Medical Record #: [id number]  Today's Date: 6/14/2022     Precautions  Precautions: Fall Risk;Swallow Precautions ( See Comments)    Assessment  Patient is 67 y.o. female with a diagnosis of DKA presenting with impaired strength and balance as well as lethargy and slow processing.  Pt is not at baseline for mobility, recommend continued therapy at SNF prior to DC home.      Plan    Recommend Physical Therapy 4 times per week until therapy goals are met for the following treatments:  Bed Mobility, Gait Training, Neuro Re-Education / Balance, Stair Training, Therapeutic Activities, and Therapeutic Exercises    DC Equipment Recommendations: Unable to determine at this time  Discharge Recommendations: Recommend post-acute placement for additional physical therapy services prior to discharge home        06/14/22 1213   Prior Living Situation   Housing / Facility 1 Story House   Steps Into Home 2   Steps In Home 0   Equipment Owned 4-Wheel Walker   Lives with - Patient's Self Care Capacity Spouse;Adult Children   Comments Pt lives with sposue and dtr   Prior Level of Functional Mobility   Bed Mobility Required Assist   Transfer Status Required Assist   Ambulation Required Assist   Distance Ambulation (Feet) 50   Assistive Devices Used 4-Wheel Walker   Comments Pt reports has been weak for the past few weeks and has required help with \"everything\"   Cognition    Comments pt is lethargic, slow to respond to questions, delayed initiation of movement   Passive ROM Lower Body   Passive ROM Lower Body Not Tested   Active ROM Lower Body    Active ROM Lower Body  WDL   Strength Lower Body   Lower Body Strength  X   Gross Strength Generalized Weakness, Equal Bilaterally   Balance Assessment   Sitting Balance (Static) Fair   Sitting Balance (Dynamic) Fair   Standing Balance (Static) Fair -   Standing Balance (Dynamic) Poor + "   Weight Shift Sitting Fair   Weight Shift Standing Fair   Comments stdg with FWW   Gait Analysis   Gait Level Of Assist Unable to Participate   Comments pt was able to take a few side steps up the side of bed with FWW Julio Cesar   Bed Mobility    Supine to Sit Moderate Assist   Sit to Supine Minimal Assist   Functional Mobility   Sit to Stand Moderate Assist   Activity Tolerance   Sitting Edge of Bed 5 min   Standing 3 min   Short Term Goals    Short Term Goal # 1 Pt will be able to perform bed mobility and sup <> sit Cresencio in 6 visits.   Short Term Goal # 2 Pt will be able to perform sit <> stand and transfer Cresencio in 6 visits.   Short Term Goal # 3 Pt will be able to ambulate 50 ft with FWW Cresencio in 6 visits.

## 2022-06-15 ENCOUNTER — ANESTHESIA (OUTPATIENT)
Dept: SURGERY | Facility: MEDICAL CENTER | Age: 67
DRG: 637 | End: 2022-06-15
Payer: MEDICARE

## 2022-06-15 ENCOUNTER — ANESTHESIA EVENT (OUTPATIENT)
Dept: SURGERY | Facility: MEDICAL CENTER | Age: 67
DRG: 637 | End: 2022-06-15
Payer: MEDICARE

## 2022-06-15 LAB
ALBUMIN SERPL BCP-MCNC: 2.6 G/DL (ref 3.2–4.9)
ALBUMIN/GLOB SERPL: 1 G/DL
ALP SERPL-CCNC: 66 U/L (ref 30–99)
ALT SERPL-CCNC: 9 U/L (ref 2–50)
ANION GAP SERPL CALC-SCNC: 8 MMOL/L (ref 7–16)
AST SERPL-CCNC: 12 U/L (ref 12–45)
BILIRUB SERPL-MCNC: 0.5 MG/DL (ref 0.1–1.5)
BUN SERPL-MCNC: 13 MG/DL (ref 8–22)
CALCIUM SERPL-MCNC: 8 MG/DL (ref 8.4–10.2)
CHLORIDE SERPL-SCNC: 103 MMOL/L (ref 96–112)
CO2 SERPL-SCNC: 28 MMOL/L (ref 20–33)
CREAT SERPL-MCNC: 0.91 MG/DL (ref 0.5–1.4)
GFR SERPLBLD CREATININE-BSD FMLA CKD-EPI: 69 ML/MIN/1.73 M 2
GLOBULIN SER CALC-MCNC: 2.5 G/DL (ref 1.9–3.5)
GLUCOSE BLD STRIP.AUTO-MCNC: 142 MG/DL (ref 65–99)
GLUCOSE BLD STRIP.AUTO-MCNC: 148 MG/DL (ref 65–99)
GLUCOSE BLD STRIP.AUTO-MCNC: 187 MG/DL (ref 65–99)
GLUCOSE BLD STRIP.AUTO-MCNC: 249 MG/DL (ref 65–99)
GLUCOSE BLD STRIP.AUTO-MCNC: 311 MG/DL (ref 65–99)
GLUCOSE SERPL-MCNC: 158 MG/DL (ref 65–99)
PATHOLOGY CONSULT NOTE: NORMAL
POTASSIUM SERPL-SCNC: 3.1 MMOL/L (ref 3.6–5.5)
PROT SERPL-MCNC: 5.1 G/DL (ref 6–8.2)
SODIUM SERPL-SCNC: 139 MMOL/L (ref 135–145)

## 2022-06-15 PROCEDURE — 700111 HCHG RX REV CODE 636 W/ 250 OVERRIDE (IP): Performed by: INTERNAL MEDICINE

## 2022-06-15 PROCEDURE — 770020 HCHG ROOM/CARE - TELE (206)

## 2022-06-15 PROCEDURE — 99233 SBSQ HOSP IP/OBS HIGH 50: CPT | Performed by: INTERNAL MEDICINE

## 2022-06-15 PROCEDURE — 160009 HCHG ANES TIME/MIN: Performed by: INTERNAL MEDICINE

## 2022-06-15 PROCEDURE — 700102 HCHG RX REV CODE 250 W/ 637 OVERRIDE(OP): Performed by: INTERNAL MEDICINE

## 2022-06-15 PROCEDURE — A9270 NON-COVERED ITEM OR SERVICE: HCPCS | Performed by: INTERNAL MEDICINE

## 2022-06-15 PROCEDURE — A9270 NON-COVERED ITEM OR SERVICE: HCPCS | Performed by: FAMILY MEDICINE

## 2022-06-15 PROCEDURE — 0DBP8ZX EXCISION OF RECTUM, VIA NATURAL OR ARTIFICIAL OPENING ENDOSCOPIC, DIAGNOSTIC: ICD-10-PCS | Performed by: INTERNAL MEDICINE

## 2022-06-15 PROCEDURE — 700102 HCHG RX REV CODE 250 W/ 637 OVERRIDE(OP): Performed by: FAMILY MEDICINE

## 2022-06-15 PROCEDURE — 99140 ANES COMP EMERGENCY COND: CPT | Performed by: ANESTHESIOLOGY

## 2022-06-15 PROCEDURE — C9113 INJ PANTOPRAZOLE SODIUM, VIA: HCPCS | Performed by: INTERNAL MEDICINE

## 2022-06-15 PROCEDURE — 700111 HCHG RX REV CODE 636 W/ 250 OVERRIDE (IP): Performed by: FAMILY MEDICINE

## 2022-06-15 PROCEDURE — 160048 HCHG OR STATISTICAL LEVEL 1-5: Performed by: INTERNAL MEDICINE

## 2022-06-15 PROCEDURE — 0DB48ZX EXCISION OF ESOPHAGOGASTRIC JUNCTION, VIA NATURAL OR ARTIFICIAL OPENING ENDOSCOPIC, DIAGNOSTIC: ICD-10-PCS | Performed by: INTERNAL MEDICINE

## 2022-06-15 PROCEDURE — 160035 HCHG PACU - 1ST 60 MINS PHASE I: Performed by: INTERNAL MEDICINE

## 2022-06-15 PROCEDURE — 82962 GLUCOSE BLOOD TEST: CPT | Mod: 91

## 2022-06-15 PROCEDURE — 160203 HCHG ENDO MINUTES - 1ST 30 MINS LEVEL 4: Performed by: INTERNAL MEDICINE

## 2022-06-15 PROCEDURE — 700105 HCHG RX REV CODE 258: Performed by: FAMILY MEDICINE

## 2022-06-15 PROCEDURE — 700111 HCHG RX REV CODE 636 W/ 250 OVERRIDE (IP): Performed by: ANESTHESIOLOGY

## 2022-06-15 PROCEDURE — 80053 COMPREHEN METABOLIC PANEL: CPT

## 2022-06-15 PROCEDURE — 00813 ANES UPR LWR GI NDSC PX: CPT | Performed by: ANESTHESIOLOGY

## 2022-06-15 PROCEDURE — 160002 HCHG RECOVERY MINUTES (STAT): Performed by: INTERNAL MEDICINE

## 2022-06-15 PROCEDURE — 88305 TISSUE EXAM BY PATHOLOGIST: CPT

## 2022-06-15 RX ORDER — ONDANSETRON 2 MG/ML
4 INJECTION INTRAMUSCULAR; INTRAVENOUS
Status: DISCONTINUED | OUTPATIENT
Start: 2022-06-15 | End: 2022-06-15 | Stop reason: HOSPADM

## 2022-06-15 RX ORDER — DIPHENHYDRAMINE HYDROCHLORIDE 50 MG/ML
12.5 INJECTION INTRAMUSCULAR; INTRAVENOUS
Status: DISCONTINUED | OUTPATIENT
Start: 2022-06-15 | End: 2022-06-15 | Stop reason: HOSPADM

## 2022-06-15 RX ORDER — POTASSIUM CHLORIDE 20 MEQ/1
40 TABLET, EXTENDED RELEASE ORAL ONCE
Status: COMPLETED | OUTPATIENT
Start: 2022-06-15 | End: 2022-06-15

## 2022-06-15 RX ORDER — HALOPERIDOL 5 MG/ML
1 INJECTION INTRAMUSCULAR
Status: DISCONTINUED | OUTPATIENT
Start: 2022-06-15 | End: 2022-06-15 | Stop reason: HOSPADM

## 2022-06-15 RX ORDER — SODIUM CHLORIDE, SODIUM LACTATE, POTASSIUM CHLORIDE, CALCIUM CHLORIDE 600; 310; 30; 20 MG/100ML; MG/100ML; MG/100ML; MG/100ML
INJECTION, SOLUTION INTRAVENOUS CONTINUOUS
Status: DISCONTINUED | OUTPATIENT
Start: 2022-06-15 | End: 2022-06-15 | Stop reason: HOSPADM

## 2022-06-15 RX ADMIN — SODIUM CHLORIDE 3 G: 900 INJECTION INTRAVENOUS at 18:28

## 2022-06-15 RX ADMIN — PANTOPRAZOLE SODIUM 40 MG: 40 INJECTION, POWDER, FOR SOLUTION INTRAVENOUS at 18:21

## 2022-06-15 RX ADMIN — FENTANYL CITRATE 50 MCG: 50 INJECTION, SOLUTION INTRAMUSCULAR; INTRAVENOUS at 16:10

## 2022-06-15 RX ADMIN — PROPOFOL 250 MG: 10 INJECTION, EMULSION INTRAVENOUS at 15:32

## 2022-06-15 RX ADMIN — ONDANSETRON 4 MG: 2 INJECTION INTRAMUSCULAR; INTRAVENOUS at 08:50

## 2022-06-15 RX ADMIN — POTASSIUM CHLORIDE 40 MEQ: 1500 TABLET, EXTENDED RELEASE ORAL at 08:37

## 2022-06-15 RX ADMIN — SODIUM CHLORIDE 3 G: 900 INJECTION INTRAVENOUS at 06:12

## 2022-06-15 RX ADMIN — INSULIN LISPRO 5 UNITS: 100 INJECTION, SOLUTION INTRAVENOUS; SUBCUTANEOUS at 11:39

## 2022-06-15 RX ADMIN — SODIUM CHLORIDE 3 G: 900 INJECTION INTRAVENOUS at 23:09

## 2022-06-15 RX ADMIN — FENTANYL CITRATE 50 MCG: 50 INJECTION, SOLUTION INTRAMUSCULAR; INTRAVENOUS at 15:56

## 2022-06-15 RX ADMIN — INSULIN LISPRO 5 UNITS: 100 INJECTION, SOLUTION INTRAVENOUS; SUBCUTANEOUS at 18:29

## 2022-06-15 RX ADMIN — SODIUM CHLORIDE 3 G: 900 INJECTION INTRAVENOUS at 11:39

## 2022-06-15 RX ADMIN — ONDANSETRON 4 MG: 4 TABLET, ORALLY DISINTEGRATING ORAL at 23:10

## 2022-06-15 RX ADMIN — INSULIN LISPRO 3 UNITS: 100 INJECTION, SOLUTION INTRAVENOUS; SUBCUTANEOUS at 23:09

## 2022-06-15 RX ADMIN — LISINOPRIL 2.5 MG: 2.5 TABLET ORAL at 06:13

## 2022-06-15 RX ADMIN — DOXYCYCLINE 100 MG: 100 TABLET, FILM COATED ORAL at 18:25

## 2022-06-15 RX ADMIN — METOPROLOL TARTRATE 12.5 MG: 25 TABLET, FILM COATED ORAL at 18:21

## 2022-06-15 RX ADMIN — PANTOPRAZOLE SODIUM 40 MG: 40 INJECTION, POWDER, FOR SOLUTION INTRAVENOUS at 06:13

## 2022-06-15 RX ADMIN — ATORVASTATIN CALCIUM 40 MG: 40 TABLET, FILM COATED ORAL at 18:25

## 2022-06-15 RX ADMIN — INSULIN LISPRO 2 UNITS: 100 INJECTION, SOLUTION INTRAVENOUS; SUBCUTANEOUS at 18:25

## 2022-06-15 RX ADMIN — LORAZEPAM 0.5 MG: 2 INJECTION INTRAMUSCULAR; INTRAVENOUS at 09:40

## 2022-06-15 RX ADMIN — DOXYCYCLINE 100 MG: 100 TABLET, FILM COATED ORAL at 06:12

## 2022-06-15 ASSESSMENT — ENCOUNTER SYMPTOMS
CONSTIPATION: 0
EYE PAIN: 0
SEIZURES: 0
NECK PAIN: 0
MYALGIAS: 0
VOMITING: 0
PALPITATIONS: 0
EYE REDNESS: 0
WEIGHT LOSS: 0
SPUTUM PRODUCTION: 0
CHILLS: 1
BLOOD IN STOOL: 0
FEVER: 1
SHORTNESS OF BREATH: 1
WHEEZING: 0
ORTHOPNEA: 0
EYE DISCHARGE: 0
ABDOMINAL PAIN: 0
DIARRHEA: 0
COUGH: 1
CLAUDICATION: 0
FOCAL WEAKNESS: 0
BACK PAIN: 0
SINUS PAIN: 0

## 2022-06-15 ASSESSMENT — PAIN DESCRIPTION - PAIN TYPE
TYPE: SURGICAL PAIN
TYPE: ACUTE PAIN
TYPE: SURGICAL PAIN

## 2022-06-15 ASSESSMENT — FIBROSIS 4 INDEX: FIB4 SCORE: 1.14

## 2022-06-15 NOTE — PROGRESS NOTES
Hospital Medicine Daily Progress Note    Date of Service  6/15/2022    Chief Complaint  Yaima Willis is a 67 y.o. female admitted 6/11/2022 with DKA    Hospital Course  This is a 66yo female with a histoy of chronic low back pain, diabetes, morbid obesity and HTN who presented to [facility] with generalized pain, chest pain, diarrhea and chills.  She was found to have new onset afib there along with sepsis and TAO. While awaiting transfer to [facility], she also had an episode of hematemesis.  She was admitted to our ICU and DKA resolved.     Interval Problem Update  6/13 - Discussed goals of care with pt and family, pt is DNR/DNI but does not want to pursue comfort care, would be ok with GI scoping if needed. SLP seeing pt this am as there was concern for aspiration as she failed her bedside swallow, but did pass for purees with them. She remains febrile today, will add Doxy for atypical and MRSA coverage as well.  Is on 6L oxymask. Clonidine patch given for HTN, now that pt can eat, will dc Cortrak and add PO Metoprolol.  Regarding the hematemesis PTA, hemoglobins remain stable - question of duodenitis on CT, may have a duodenal ulcer. Will discuss with GI.  Replace K+ IV today, check mag and phos. Pt continues to complain of chest pain, Dr Ramírez to see pt today, appreciated - avoiding ASA for now given her hematemesis and FOB positive status. Discussed wit Dr He, given stability of hemoglobin, plan for outpt scoping unless Cards wanted to perform LHC with possible stents, and then they would consider inpt scoping prior to LHC.     6/14: no acute event overnight. Per card: to coordinate with GI for endoscopy. Per GI to get EGD as outpatient.  hgb stable. Reached out to GI for inpatient EGD    6/15: The patient is having hard time getting GoLytely.  Complain about nausea.  Repleted potassium today.  Plan to have EGD and colonoscopy later today.  I have personally seen and examined the patient at bedside. I  discussed the plan of care with patient, family, bedside RN and cardiology.    Consultants/Specialty  cardiology    Code Status  DNAR/DNI    Disposition  Patient is not medically cleared for discharge.   Anticipate discharge to to skilled nursing facility.  I have placed the appropriate orders for post-discharge needs.    Review of Systems  Review of Systems   Constitutional: Positive for chills, fever and malaise/fatigue. Negative for weight loss.   HENT: Negative for congestion, hearing loss, nosebleeds and sinus pain.    Eyes: Negative for pain, discharge and redness.   Respiratory: Positive for cough and shortness of breath. Negative for sputum production and wheezing.    Cardiovascular: Negative for palpitations, orthopnea and claudication.   Gastrointestinal: Negative for abdominal pain, blood in stool, constipation, diarrhea and vomiting.   Genitourinary: Negative for frequency, hematuria and urgency.   Musculoskeletal: Negative for back pain, myalgias and neck pain.   Skin: Negative for itching and rash.   Neurological: Negative for focal weakness and seizures.   All other systems reviewed and are negative.       Physical Exam  Temp:  [36.8 °C (98.2 °F)-37.2 °C (99 °F)] 36.8 °C (98.2 °F)  Pulse:  [54-62] 60  Resp:  [17-30] 18  BP: (113-149)/(43-62) 136/56  SpO2:  [91 %-95 %] 94 %    Physical Exam  Vitals and nursing note reviewed.   Constitutional:       General: She is not in acute distress.     Appearance: Normal appearance. She is obese. She is ill-appearing and toxic-appearing. She is not diaphoretic.   HENT:      Head: Normocephalic and atraumatic.      Right Ear: Tympanic membrane and ear canal normal.      Left Ear: Tympanic membrane and ear canal normal.      Nose: No congestion or rhinorrhea.      Mouth/Throat:      Mouth: Mucous membranes are moist.   Eyes:      Extraocular Movements: Extraocular movements intact.      Conjunctiva/sclera: Conjunctivae normal.      Pupils: Pupils are equal, round,  and reactive to light.   Cardiovascular:      Rate and Rhythm: Normal rate and regular rhythm.      Pulses: Normal pulses.      Heart sounds: Normal heart sounds. No murmur heard.    No friction rub. No gallop.   Pulmonary:      Effort: Pulmonary effort is normal. No respiratory distress.      Breath sounds: No stridor. Rales present. No wheezing or rhonchi.      Comments: Very mild increased work of breathing    Abdominal:      General: Abdomen is flat. Bowel sounds are normal. There is no distension.      Palpations: Abdomen is soft. There is no mass.      Tenderness: There is no abdominal tenderness.      Hernia: No hernia is present.   Musculoskeletal:         General: No swelling, tenderness or deformity.      Cervical back: Normal range of motion and neck supple.   Skin:     General: Skin is warm and dry.      Coloration: Skin is not jaundiced.   Neurological:      General: No focal deficit present.      Mental Status: She is alert.         Fluids    Intake/Output Summary (Last 24 hours) at 6/15/2022 0956  Last data filed at 6/15/2022 0950  Gross per 24 hour   Intake --   Output 3325 ml   Net -3325 ml       Laboratory  Recent Labs     06/13/22  0520 06/13/22  1051 06/14/22  0300   WBC  --  20.5* 14.8*   RBC  --  3.96* 3.83*   HEMOGLOBIN 10.4* 10.7* 10.4*   HEMATOCRIT 32.7* 33.9* 32.7*   MCV  --  85.6 85.4   MCH  --  27.0 27.2   MCHC  --  31.6* 31.8*   RDW  --  49.3 49.4   PLATELETCT  --  259 232   MPV  --  12.2 11.6     Recent Labs     06/13/22  0520 06/14/22  0300 06/15/22  0215   SODIUM 146* 136 139   POTASSIUM 3.3* 3.6 3.1*   CHLORIDE 110 101 103   CO2 24 27 28   GLUCOSE 273* 308* 158*   BUN 30* 21 13   CREATININE 1.35 1.09 0.91   CALCIUM 8.8 8.3* 8.0*                   Imaging  DX-CHEST-PORTABLE (1 VIEW)   Final Result      1.  No acute cardiopulmonary abnormality identified.      2.  Bilateral atelectasis, unchanged      3.  Right midlung zone granuloma      4.  Enlarged cardiac silhouette      5.  Right  internal jugular catheter and enteric catheter appear appropriately located      DX-ABDOMEN FOR TUBE PLACEMENT   Final Result      Nasogastric tube terminates within the gastric body.      CT-ABDOMEN-PELVIS W/O   Final Result      Moderate lower lobe consolidation on the right, peribronchial opacity on the left is worrisome for aspiration pneumonitis favored over pneumonia      Moderate pericardial effusion      Remote granulomatous disease      Subtle paraduodenal, peripancreatic fat stranding could indicate pancreatitis, duodenitis or may just be chronic, age related. Recommend correlation with pancreatic enzymes      Large intrauterine mass most likely is a leiomyoma but given the large size recommended gynecologic surgical referral as there is risk of sarcomatous degeneration when large      Benign right adrenal adenoma does not require follow-up      DX-CHEST-PORTABLE (1 VIEW)   Final Result      1.  Pulmonary vascular prominence with increased interstitial markings are suggestive of edema. There is likely a component of vessel crowding secondary to portable AP technique and low lung volumes.      2.  Right internal jugular catheter is noted with the tip projecting over the superior vena cava. No pneumothorax is identified.      CT-HEAD W/O   Final Result      1.  No acute intracranial findings.      2.  Diffuse atrophy               EC-ECHOCARDIOGRAM COMPLETE W/O CONT   Final Result      DX-CHEST-PORTABLE (1 VIEW)   Final Result      Bibasilar pulmonary opacities, left greater than right, which could be related to atelectasis and/or pneumonitis. Small left pleural effusion.      OUTSIDE IMAGES-DX CHEST   Final Result           Assessment/Plan  Acute respiratory failure with hypoxemia (HCC)  Assessment & Plan  - Secondary to bilateral PNA  - Continue antibiotics with unasyn and doxy      Bilateral pneumonia  Assessment & Plan  - Change Rocephin to Unasyn/Doxy,  - Check MRSA nares : negative    DKA (diabetic  ketoacidosis) ([facility])  Assessment & Plan  - A1c 12.3  - Unknown home regimen - nursing calling pharmacy to reconcile, pt states she takes 70NPH and 15u regular BID   -  this am with 21u Semglee last evening - increase to 28u  - Daughter is going to [facility] to meet with diabetes educator as pt does need further education given her DKA     New onset atrial fibrillation (HCC)  Assessment & Plan  - Not anticoagulating due to FOB positive hematemesis   - oral metoprolol     Uterine mass  Assessment & Plan  - Likely leiomyoma, will refer to Gyn Onc as an outpatient, I did let patient and family know of the finding    Abnormal CT of the abdomen  Assessment & Plan  - Question of pancreatitis vs duodenitis vs normal age related variant  - Lipase normal  - ?? Duodenal ulcer as she also had hematemesis  - BID protonix  - GI to have EGD    Pericardial effusion  Assessment & Plan  - Seen on CT but not on echo   - No evidence of tamponade clinically       NSTEMI (non-ST elevated myocardial infarction) ([facility])  Assessment & Plan  - No anticoagulation currently given hematemesis, will optimize medically  - Start beta blocker today - if Bps and renal function stable in the am, start Losartan  - Consult placed to Dr Ramírez   - Peak trop of 724, trending down - recheck today given continued chest pain, but also may be referred from UGI issues and hematemesis    - Echo without WMA   - Card :wants inpatient NPI work up    Hypokalemia  Assessment & Plan  - Replace IV today, check mag and phos    Aspiration into lower respiratory tract  Assessment & Plan  - Failed bedside swallow with nursing, SLP santyal ok for purees    Hematemesis  Assessment & Plan  - FOB positive  - Duodenitis seen on CT, suspect possible duodenal ulcer  - GI: EGD   - BID PPI  - Hemoglobins are stable    Sepsis ([facility])  Assessment & Plan  This is Sepsis Present on admission  SIRS criteria identified on my evaluation include: Fever, with temperature greater than 101 deg F,  Tachycardia, with heart rate greater than 90 BPM, Tachypnea, with respirations greater than 20 per minute and Leukocytosis, with WBC greater than 12,000  Source is Aspiration PNA  Sepsis protocol initiated  Fluid resuscitation ordered per protocol  Crystalloid Fluid Administration: Fluid resuscitation ordered per standard protocol - 30 mL/kg per current or ideal body weight  IV antibiotics as appropriate for source of sepsis  Reassessment: I have reassessed the patient's hemodynamic status    Sputum culture pending, blood cultures negative. Unasyn/Doxy as unclear currently if this is CAP or Aspiration PNA, and pt continues to spike fevers on just Rocephin/Azithro.      Hypertension  Assessment & Plan  - Unknown home meds  - Start beta blocker for afib, add Losartan if renal function stable in the am    Acute renal failure (ARF) (HCC)  Assessment & Plan  - Pre renal due to sepsis, now resolved           VTE prophylaxis: SCDs/TEDs    I have performed a physical exam and reviewed and updated ROS and Plan today (6/15/2022). In review of yesterday's note (6/14/2022), there are no changes except as documented above.

## 2022-06-15 NOTE — PROGRESS NOTES
Received pt report and assumed pt care. Assessment revealed pt alert and oriented x4. Nauseas and dry heaving. Medicated according to MAR. Pt on oxygen with o2 sats in the 90s. No acute distress. Will continue to monitor.

## 2022-06-15 NOTE — DISCHARGE PLANNING
Case Management Discharge Planning    Admission Date: 6/11/2022  GMLOS: 4.8  ALOS: 4    6-Clicks ADL Score: 12  6-Clicks Mobility Score: 10  PT and/or OT Eval ordered: Yes  Post-acute Referrals Ordered: Yes  Post-acute Choice Obtained: Yes  Has referral(s) been sent to post-acute provider:  Yes      Anticipated Discharge Dispo:      DME Needed: No    Action(s) Taken: Choice obtained, CM RN met with pt at bedside. Pt is from [place], [place]. Pt preferring to go back to [facility] for SNF. Received choice for [facility] in [place]. Pt requesting CM RN speak with family. CM RN called Krystyna michael at [phone]. Discussed SNF choice and choice given for [facility], [place]ED[place]ICK RN requesting further SNF choices in the [facility] area. Krystyna stating will do some research and get back to CM RN with choices. Per dtr and pt preference is [facility] in [place], [place].     Escalations Completed: None    Medically Clear: No    Next Steps: CM RN to send choice to [facility]    Barriers to Discharge: Pending Placement, Medical Clearance    Is the patient up for discharge tomorrow: No

## 2022-06-15 NOTE — PROGRESS NOTES
Patient c/o N/V, small amount of emesis 50mL, clear/yellow, has had broth/water/GoLytely.  Educated on PRN medication and administered per orders. Will continue to monitor.

## 2022-06-15 NOTE — OR SURGEON
Immediate Post OP Note    PreOp Diagnosis: GI bleed, anemia      PostOp Diagnosis:   EGD - 15 cm of severe grade D reflux esophagitis.  Soft tissue mass at the GE junction  Gastritis, erosive  Duodenitis    Colon - multiple ~14 polyps, from 3-12 cm in size.  Biopsy obtained.        Procedure(s):  GASTROSCOPY AND COLONOSCOPY - Wound Class: Clean Contaminated  COLONOSCOPY - Wound Class: Clean Contaminated    Surgeon(s):  BRIANA Mendes M.D.    Anesthesiologist/Type of Anesthesia:  Anesthesiologist: Juan Mahmood M.D./MARLEY    Surgical Staff:  Circulator: Abdiaziz Avina R.N.  Endoscopy Technician: Dangelo Glynn    Specimens removed if any:  ID Type Source Tests Collected by Time Destination   A : GE junction mass bx Tissue Esophagus PATHOLOGY SPECIMEN Ty He D.O. 6/15/2022  3:18 PM    B : polyp bx Tissue Rectal PATHOLOGY SPECIMEN Ty He D.O. 6/15/2022  3:23 PM        Estimated Blood Loss: minimal    Findings: as above, o/w normal EGD/colon    Complications: no immediate complications    Rec:  OK to proceed with anticoagulation.  IV PPI  Reflux precautions  F/U biopsies.  Will need repeat EGD and colonoscopy (for polyp removal) as outpatient.        6/15/2022 3:34 PM Yevgeniy Jo M.D.

## 2022-06-15 NOTE — PROGRESS NOTES
Patient given continued encouragement and education for need to drink GoLytely, patient only able to take little amounts, c/o nausea, no emesis after last dose of PRN med at 2130. Will continue to monitor.

## 2022-06-15 NOTE — ANESTHESIA TIME REPORT
Anesthesia Start and Stop Event Times     Date Time Event    6/15/2022 1500 Anesthesia Start     1503 Ready for Procedure     1602 Anesthesia Stop        Responsible Staff  06/15/22    Name Role Begin End    Juan Mahmood M.D. Anesth 1500 1602        Overtime Reason:  overtime    Comments:

## 2022-06-15 NOTE — PROGRESS NOTES
Telemetry Shift Summary    Rhythm SR/SB  HR Range 47-71  Ectopy rPAC  Measurements 0.18/0.08/0.44        Normal Values  Rhythm SR  HR Range    Measurements 0.12-0.20 / 0.06-0.10  / 0.30-0.52

## 2022-06-15 NOTE — ANESTHESIA PREPROCEDURE EVALUATION
Case: [id number] Date/Time: 06/15/22 1445    Procedures:       GASTROSCOPY AND COLONOSCOPY      COLONOSCOPY    Pre-op diagnosis: hematochezia    Location:  ENDOSCOPIC ULTRASOUND ROOM / SURGERY [facility]    Surgeons: Ty He D.O.          Relevant Problems   PULMONARY   (positive) Bilateral pneumonia      CARDIAC   (positive) Hypertension   (positive) NSTEMI (non-ST elevated myocardial infarction) (HCC)   (positive) New onset atrial fibrillation (HCC)         (positive) Acute renal failure (ARF) (HCC)      ENDO   (positive) DKA, type 1, not at goal (HCC)       Physical Exam    Airway   Mallampati: II  TM distance: >3 FB  Neck ROM: full       Cardiovascular - normal exam  Rhythm: regular  Rate: normal  (-) murmur     Dental - normal exam           Pulmonary - normal exam  Breath sounds clear to auscultation     Abdominal    Neurological - normal exam                 Anesthesia Plan    ASA 3- EMERGENT   ASA physical status 3 criteria: morbid obesity - BMI greater than or equal to 40ASA physical status emergent criteria: acute hemorrhage    Plan - MAC               Induction: intravenous    Postoperative Plan: Postoperative administration of opioids is intended.    Pertinent diagnostic labs and testing reviewed    Informed Consent:    Anesthetic plan and risks discussed with patient.    Use of blood products discussed with: patient whom consented to blood products.

## 2022-06-15 NOTE — ANESTHESIA POSTPROCEDURE EVALUATION
Patient: Asmita Willis    Procedure Summary     Date: 06/15/22 Room / Location:  ENDOSCOPIC ULTRASOUND ROOM / SURGERY [facility]    Anesthesia Start: 1500 Anesthesia Stop: 1557    Procedures:       GASTROSCOPY AND COLONOSCOPY (N/A )      COLONOSCOPY (N/A ) Diagnosis:       Esophagitis      Gastritis      Esophageal mass      Rectal polyp      (Esophageal mass [070703])    Surgeons: Ty He D.O. Responsible Provider: Juan Mahmood M.D.    Anesthesia Type: general ASA Status: 3 - Emergent          Final Anesthesia Type: general  Last vitals  BP   Blood Pressure : (!) 143/54    Temp   36.3 °C (97.3 °F)    Pulse   63   Resp   18    SpO2   100 %      Anesthesia Post Evaluation    Patient location during evaluation: PACU  Patient participation: complete - patient participated  Level of consciousness: awake and alert    Airway patency: patent  Anesthetic complications: no  Cardiovascular status: hemodynamically stable  Respiratory status: acceptable  Hydration status: euvolemic    PONV: none          There were no known complications for this encounter.     Nurse Pain Score: 9 (NPRS)

## 2022-06-15 NOTE — CARE PLAN
The patient is Stable - Low risk of patient condition declining or worsening    Shift Goals  Clinical Goals: Manage anxiety.  Patient Goals: rest  Family Goals: updates    Progress made toward(s) clinical / shift goals:    Problem: Risk for Bleeding  Goal: Patient will take measures to prevent bleeding and recognizes signs of bleeding that need to be reported immediately to a health care professional  Outcome: Progressing  Note: Pt was educated to report black or bloody stools.       Patient is not progressing towards the following goals:

## 2022-06-15 NOTE — OR NURSING
1536- Patient arrived from [facility] via gurney.  No dressing.    Stop Bang per protocol for a score of 6.    Sedation/Resp Status: Non responsive to verbal with no OPA in placeRespirations spontaneous and non-labored.    HR 62; VSS on 10L 02 via simple mask for stop bang protocol.    Patient does not appear to be in pain or nauseous as evidence by sleeping.    1550- Patient reports 9/10 abdominal pain. Medicated per MAR. She denies nausea.    1610- Patient reports 9/10 pain still. Medicated per MAR. No nausea.    1625- Med hold complete. Patient reports pain is now 5/10. She denies any nausea.    1636- Stop bang hold complete. Report called to Josiah AGUILERA. Placed order for transport.    1650- Awaiting transport still. Patient reports that she has to go to the bathroom. Offered a commode. Patient declined. Pain reported to be tolerable. No nausea.     1705- Patient reports pain is tolerable and she has no nausea. Awaiting transport to  patient    1720- Patient reports pain is tolerable and she has no nausea. Awaiting transport to  patient    1730- Was told there is no more transport staff for the day. Placed patient on bedpan per request. CNA showed up a minute after patient was placed on the bedpan and she said she will just wait until she gets to her room    1732- CNA transported patient to room via gurney. 368 in o2 tank

## 2022-06-15 NOTE — PROGRESS NOTES
Patient continuing to refuse GoLytely, attempted a couple drinks and became nauseated with dry heaves. Will pass on to day shift.

## 2022-06-15 NOTE — CARE PLAN
The patient is Watcher - Medium risk of patient condition declining or worsening    Shift Goals  Clinical Goals: Patient anxiety to remain acceptable during shift.  Patient Goals: Rest and sleep tonight with minimal interuption.  Family Goals: updates    Progress made toward(s) clinical / shift goals:    Patient anxiety decreased and patient was able to rest after nausea improved.     Patient is not progressing towards the following goals:

## 2022-06-15 NOTE — DISCHARGE INSTRUCTIONS
Discharge Instructions    Discharged to home by car with relative. Discharged via wheelchair, hospital escort: Yes.  Special equipment needed: Not Applicable    Be sure to schedule a follow-up appointment with your primary care doctor or any specialists as instructed.     Discharge Plan:   Diet Plan: Discussed  Activity Level: Discussed  Confirmed Follow up Appointment: Appointment Scheduled  Confirmed Symptoms Management: Discussed  Medication Reconciliation Updated: Yes    I understand that a diet low in cholesterol, fat, and sodium is recommended for good health. Unless I have been given specific instructions below for another diet, I accept this instruction as my diet prescription.   Other diet: clear liquid    Special Instructions: None    Is patient discharged on Warfarin / Coumadin?   No     Depression / Suicide Risk    As you are discharged from this [facility] facility, it is important to learn how to keep safe from harming yourself.    Recognize the warning signs:  Abrupt changes in personality, positive or negative- including increase in energy   Giving away possessions  Change in eating patterns- significant weight changes-  positive or negative  Change in sleeping patterns- unable to sleep or sleeping all the time   Unwillingness or inability to communicate  Depression  Unusual sadness, discouragement and loneliness  Talk of wanting to die  Neglect of personal appearance   Rebelliousness- reckless behavior  Withdrawal from people/activities they love  Confusion- inability to concentrate     If you or a loved one observes any of these behaviors or has concerns about self-harm, here's what you can do:  Talk about it- your feelings and reasons for harming yourself  Remove any means that you might use to hurt yourself (examples: pills, rope, extension cords, firearm)  Get professional help from the community (Mental Health, Substance Abuse, psychological counseling)  Do not be alone:Call your Safe  Contact- someone whom you trust who will be there for you.  Call your local CRISIS HOTLINE 316-0002 or [phone]  Call your local Children's Mobile Crisis Response Team [place] (770) 618-4813 or gate5.Symptify  Call the toll free National Suicide Prevention Hotlines   National Suicide Prevention Lifeline 334-845-URZY (7669)  [facility] Line Network 800-SUICIDE (781-1923)  Diabetic Ketoacidosis  Diabetic ketoacidosis is a serious complication of diabetes. This condition develops when there is not enough insulin in the body. Insulin is an hormone that regulates blood sugar levels in the body. Normally, insulin allows glucose to enter the cells in the body. The cells break down glucose for energy. Without enough insulin, the body cannot break down glucose, so it breaks down fats instead. This leads to high blood glucose levels in the body and the production of acids that are called ketones. Ketones are poisonous at high levels.  If diabetic ketoacidosis is not treated, it can cause severe dehydration and can lead to a coma or death.  What are the causes?  This condition develops when a lack of insulin causes the body to break down fats instead of glucose. This may be triggered by:  Stress on the body. This stress is brought on by an illness.  Infection.  Medicines that raise blood glucose levels.  Not taking diabetes medicine.  New onset of type 1 diabetes mellitus.  What are the signs or symptoms?  Symptoms of this condition include:  Fatigue.  Weight loss.  Excessive thirst.  Light-headedness.  Fruity or sweet-smelling breath.  Excessive urination.  Vision changes.  Confusion or irritability.  Nausea.  Vomiting.  Rapid breathing.  Abdominal pain.  Feeling flushed.  How is this diagnosed?  This condition is diagnosed based on your medical history, a physical exam, and blood tests. You may also have a urine test to check for ketones.  How is this treated?  This condition may be treated with:  Fluid  replacement. This may be done to correct dehydration.  Insulin injections. These may be given through the skin or through an IV tube.  Electrolyte replacement. Electrolytes are minerals in your blood. Electrolytes such as potassium and sodium may be given in pill form or through an IV tube.  Antibiotic medicines. These may be prescribed if your condition was caused by an infection.  Diabetic ketoacidosis is a serious medical condition. You may need emergency treatment in the hospital to monitor your condition.  Follow these instructions at home:  Eating and drinking  Drink enough fluids to keep your urine clear or pale yellow.  If you are not able to eat, drink clear fluids in small amounts as you are able. Clear fluids include water, ice chips, fruit juice with water added (diluted), and low-calorie sports drinks. You may also have sugar-free jello or popsicles.  If you are able to eat, follow your usual diet and drink sugar-free liquids, such as water.  Medicines  Take over-the-counter and prescription medicines only as told by your health care provider.  Continue to take insulin and other diabetes medicines as told by your health care provider.  If you were prescribed an antibiotic, take it as told by your health care provider. Do not stop taking the antibiotic even if you start to feel better.  General instructions    Check your urine for ketones when you are ill and as told by your health care provider.  If your blood glucose is 240 mg/dL (13.3 mmol/L) or higher, check your urine ketones every 4-6 hours.  Check your blood glucose every day, as often as told by your health care provider.  If your blood glucose is high, drink plenty of fluids. This helps to flush out ketones.  If your blood glucose is above your target for 2 tests in a row, contact your health care provider.  Carry a medical alert card or wear medical alert jewelry that says that you have diabetes.  Rest and exercise only as told by your health  care provider. Do not exercise when your blood glucose is high and you have ketones in your urine.  If you get sick, call your health care provider and begin treatment quickly. Your body often needs extra insulin to fight an illness. Check your blood glucose every 4-6 hours when you are sick.  Keep all follow-up visits as told by your health care provider. This is important.  Contact a health care provider if:  Your blood glucose level is higher than 240 mg/dL (13.3 mmol/L) for 2 days in a row.  You have moderate or large ketones in your urine.  You have a fever.  You cannot eat or drink without vomiting.  You have been vomiting for more than 2 hours.  You continue to have symptoms of diabetic ketoacidosis.  You develop new symptoms.  Get help right away if:  Your blood glucose monitor reads “high” even when you are taking insulin.  You faint.  You have chest pain.  You have trouble breathing.  You have sudden trouble speaking or swallowing.  You have vomiting or diarrhea that gets worse after 3 hours.  You are unable to stay awake.  You have trouble thinking.  You are severely dehydrated. Symptoms of severe dehydration include:  Extreme thirst.  Dry mouth.  Rapid breathing.  These symptoms may represent a serious problem that is an emergency. Do not wait to see if the symptoms will go away. Get medical help right away. Call your local emergency services (911 in the [place] Do not drive yourself to the hospital.  Summary  Diabetic ketoacidosis is a serious complication of diabetes. This condition develops when there is not enough insulin in the body.  This condition is diagnosed based on your medical history, a physical exam, and blood tests. You may also have a urine test to check for ketones.  Diabetic ketoacidosis is a serious medical condition. You may need emergency treatment in the hospital to monitor your condition.  Contact your health care provider if your blood glucose is higher than 240 mg/dl for 2 days in  a row or if you have moderate or large ketones in your urine.  This information is not intended to replace advice given to you by your health care provider. Make sure you discuss any questions you have with your health care provider.  Document Released: 12/15/2001 Document Revised: 02/02/2018 Document Reviewed: 01/22/2018  [employer] Patient Education © 2020 [employer]    Discharge Instructions    Discharged to home by car with relative. Discharged via wheelchair, hospital escort: Yes.  Special equipment needed: Not Applicable    Be sure to schedule a follow-up appointment with your primary care doctor or any specialists as instructed.     Discharge Plan:   Diet Plan: Discussed  Activity Level: Discussed  Confirmed Follow up Appointment: Appointment Scheduled  Confirmed Symptoms Management: Discussed  Medication Reconciliation Updated: Yes    I understand that a diet low in cholesterol, fat, and sodium is recommended for good health. Unless I have been given specific instructions below for another diet, I accept this instruction as my diet prescription.       Special Instructions: None    Is patient discharged on Warfarin / Coumadin?   No     Depression / Suicide Risk    As you are discharged from this [facility] facility, it is important to learn how to keep safe from harming yourself.    Recognize the warning signs:  Abrupt changes in personality, positive or negative- including increase in energy   Giving away possessions  Change in eating patterns- significant weight changes-  positive or negative  Change in sleeping patterns- unable to sleep or sleeping all the time   Unwillingness or inability to communicate  Depression  Unusual sadness, discouragement and loneliness  Talk of wanting to die  Neglect of personal appearance   Rebelliousness- reckless behavior  Withdrawal from people/activities they love  Confusion- inability to concentrate     If you or a loved one observes any of these behaviors or has  concerns about self-harm, here's what you can do:  Talk about it- your feelings and reasons for harming yourself  Remove any means that you might use to hurt yourself (examples: pills, rope, extension cords, firearm)  Get professional help from the community (Mental Health, Substance Abuse, psychological counseling)  Do not be alone:Call your Safe Contact- someone whom you trust who will be there for you.  Call your local CRISIS HOTLINE 482-4636 or [phone]  Call your local Children's Mobile Crisis Response Team [place] ([phone] 065-1934 or www.[employer]if[employer]ch  Call the toll free National Suicide Prevention Hotlines   National Suicide Prevention Lifeline 354-438-TZOJ (3848)  National Hope Line Network 800-SUICIDE (559-9939)

## 2022-06-16 LAB
ANION GAP SERPL CALC-SCNC: 8 MMOL/L (ref 7–16)
BACTERIA BLD CULT: NORMAL
BACTERIA BLD CULT: NORMAL
BUN SERPL-MCNC: 10 MG/DL (ref 8–22)
CALCIUM SERPL-MCNC: 8 MG/DL (ref 8.4–10.2)
CHLORIDE SERPL-SCNC: 102 MMOL/L (ref 96–112)
CO2 SERPL-SCNC: 26 MMOL/L (ref 20–33)
CREAT SERPL-MCNC: 0.9 MG/DL (ref 0.5–1.4)
ERYTHROCYTE [DISTWIDTH] IN BLOOD BY AUTOMATED COUNT: 47.1 FL (ref 35.9–50)
GFR SERPLBLD CREATININE-BSD FMLA CKD-EPI: 70 ML/MIN/1.73 M 2
GLUCOSE BLD STRIP.AUTO-MCNC: 155 MG/DL (ref 65–99)
GLUCOSE BLD STRIP.AUTO-MCNC: 209 MG/DL (ref 65–99)
GLUCOSE BLD STRIP.AUTO-MCNC: 340 MG/DL (ref 65–99)
GLUCOSE SERPL-MCNC: 169 MG/DL (ref 65–99)
HCT VFR BLD AUTO: 30 % (ref 37–47)
HGB BLD-MCNC: 9.7 G/DL (ref 12–16)
MCH RBC QN AUTO: 26.9 PG (ref 27–33)
MCHC RBC AUTO-ENTMCNC: 32.3 G/DL (ref 33.6–35)
MCV RBC AUTO: 83.3 FL (ref 81.4–97.8)
PLATELET # BLD AUTO: 214 K/UL (ref 164–446)
PMV BLD AUTO: 11.2 FL (ref 9–12.9)
POTASSIUM SERPL-SCNC: 3.4 MMOL/L (ref 3.6–5.5)
RBC # BLD AUTO: 3.6 M/UL (ref 4.2–5.4)
SIGNIFICANT IND 70042: NORMAL
SIGNIFICANT IND 70042: NORMAL
SITE SITE: NORMAL
SITE SITE: NORMAL
SODIUM SERPL-SCNC: 136 MMOL/L (ref 135–145)
SOURCE SOURCE: NORMAL
SOURCE SOURCE: NORMAL
TROPONIN T SERPL-MCNC: 311 NG/L (ref 6–19)
WBC # BLD AUTO: 9.3 K/UL (ref 4.8–10.8)

## 2022-06-16 PROCEDURE — A9270 NON-COVERED ITEM OR SERVICE: HCPCS | Performed by: FAMILY MEDICINE

## 2022-06-16 PROCEDURE — 700102 HCHG RX REV CODE 250 W/ 637 OVERRIDE(OP): Performed by: HOSPITALIST

## 2022-06-16 PROCEDURE — 92526 ORAL FUNCTION THERAPY: CPT

## 2022-06-16 PROCEDURE — A9270 NON-COVERED ITEM OR SERVICE: HCPCS | Performed by: HOSPITALIST

## 2022-06-16 PROCEDURE — 84484 ASSAY OF TROPONIN QUANT: CPT

## 2022-06-16 PROCEDURE — 99233 SBSQ HOSP IP/OBS HIGH 50: CPT | Performed by: INTERNAL MEDICINE

## 2022-06-16 PROCEDURE — 700111 HCHG RX REV CODE 636 W/ 250 OVERRIDE (IP): Performed by: FAMILY MEDICINE

## 2022-06-16 PROCEDURE — 700102 HCHG RX REV CODE 250 W/ 637 OVERRIDE(OP): Performed by: INTERNAL MEDICINE

## 2022-06-16 PROCEDURE — 82962 GLUCOSE BLOOD TEST: CPT

## 2022-06-16 PROCEDURE — 85027 COMPLETE CBC AUTOMATED: CPT

## 2022-06-16 PROCEDURE — 700102 HCHG RX REV CODE 250 W/ 637 OVERRIDE(OP): Performed by: FAMILY MEDICINE

## 2022-06-16 PROCEDURE — A9270 NON-COVERED ITEM OR SERVICE: HCPCS | Performed by: INTERNAL MEDICINE

## 2022-06-16 PROCEDURE — 700105 HCHG RX REV CODE 258: Performed by: FAMILY MEDICINE

## 2022-06-16 PROCEDURE — 80048 BASIC METABOLIC PNL TOTAL CA: CPT

## 2022-06-16 PROCEDURE — 700111 HCHG RX REV CODE 636 W/ 250 OVERRIDE (IP): Performed by: INTERNAL MEDICINE

## 2022-06-16 PROCEDURE — C9113 INJ PANTOPRAZOLE SODIUM, VIA: HCPCS | Performed by: INTERNAL MEDICINE

## 2022-06-16 PROCEDURE — 770020 HCHG ROOM/CARE - TELE (206)

## 2022-06-16 RX ORDER — CHOLECALCIFEROL (VITAMIN D3) 125 MCG
5 CAPSULE ORAL NIGHTLY PRN
Status: DISCONTINUED | OUTPATIENT
Start: 2022-06-16 | End: 2022-06-18 | Stop reason: HOSPADM

## 2022-06-16 RX ADMIN — PANTOPRAZOLE SODIUM 40 MG: 40 INJECTION, POWDER, FOR SOLUTION INTRAVENOUS at 05:36

## 2022-06-16 RX ADMIN — INSULIN LISPRO 2 UNITS: 100 INJECTION, SOLUTION INTRAVENOUS; SUBCUTANEOUS at 06:44

## 2022-06-16 RX ADMIN — LORAZEPAM 0.5 MG: 2 INJECTION INTRAMUSCULAR; INTRAVENOUS at 09:44

## 2022-06-16 RX ADMIN — ACETAMINOPHEN 650 MG: 325 TABLET, FILM COATED ORAL at 00:24

## 2022-06-16 RX ADMIN — ATORVASTATIN CALCIUM 40 MG: 40 TABLET, FILM COATED ORAL at 17:29

## 2022-06-16 RX ADMIN — INSULIN LISPRO 5 UNITS: 100 INJECTION, SOLUTION INTRAVENOUS; SUBCUTANEOUS at 06:43

## 2022-06-16 RX ADMIN — Medication 5 MG: at 21:56

## 2022-06-16 RX ADMIN — ONDANSETRON 4 MG: 2 INJECTION INTRAMUSCULAR; INTRAVENOUS at 13:45

## 2022-06-16 RX ADMIN — INSULIN LISPRO 5 UNITS: 100 INJECTION, SOLUTION INTRAVENOUS; SUBCUTANEOUS at 16:52

## 2022-06-16 RX ADMIN — ONDANSETRON 4 MG: 2 INJECTION INTRAMUSCULAR; INTRAVENOUS at 08:59

## 2022-06-16 RX ADMIN — DOXYCYCLINE 100 MG: 100 TABLET, FILM COATED ORAL at 17:29

## 2022-06-16 RX ADMIN — ONDANSETRON 4 MG: 2 INJECTION INTRAMUSCULAR; INTRAVENOUS at 20:10

## 2022-06-16 RX ADMIN — SODIUM CHLORIDE 3 G: 900 INJECTION INTRAVENOUS at 05:35

## 2022-06-16 RX ADMIN — DOXYCYCLINE 100 MG: 100 TABLET, FILM COATED ORAL at 05:36

## 2022-06-16 RX ADMIN — LORAZEPAM 0.5 MG: 2 INJECTION INTRAMUSCULAR; INTRAVENOUS at 14:54

## 2022-06-16 RX ADMIN — INSULIN LISPRO 6 UNITS: 100 INJECTION, SOLUTION INTRAVENOUS; SUBCUTANEOUS at 16:51

## 2022-06-16 RX ADMIN — SODIUM CHLORIDE 3 G: 900 INJECTION INTRAVENOUS at 11:40

## 2022-06-16 RX ADMIN — SODIUM CHLORIDE 3 G: 900 INJECTION INTRAVENOUS at 17:30

## 2022-06-16 RX ADMIN — PANTOPRAZOLE SODIUM 40 MG: 40 INJECTION, POWDER, FOR SOLUTION INTRAVENOUS at 17:30

## 2022-06-16 RX ADMIN — INSULIN LISPRO 3 UNITS: 100 INJECTION, SOLUTION INTRAVENOUS; SUBCUTANEOUS at 11:37

## 2022-06-16 RX ADMIN — LORAZEPAM 0.5 MG: 2 INJECTION INTRAMUSCULAR; INTRAVENOUS at 00:24

## 2022-06-16 RX ADMIN — LISINOPRIL 2.5 MG: 2.5 TABLET ORAL at 05:36

## 2022-06-16 RX ADMIN — METOPROLOL TARTRATE 12.5 MG: 25 TABLET, FILM COATED ORAL at 17:29

## 2022-06-16 ASSESSMENT — ENCOUNTER SYMPTOMS
BACK PAIN: 0
FOCAL WEAKNESS: 0
VOMITING: 0
ABDOMINAL PAIN: 0
SEIZURES: 0
ORTHOPNEA: 0
PALPITATIONS: 0
COUGH: 1
EYE DISCHARGE: 0
WHEEZING: 0
MYALGIAS: 0
SHORTNESS OF BREATH: 1
DIARRHEA: 0
EYE PAIN: 0
CLAUDICATION: 0
NECK PAIN: 0
SPUTUM PRODUCTION: 0
WEIGHT LOSS: 0
CONSTIPATION: 0

## 2022-06-16 ASSESSMENT — PAIN DESCRIPTION - PAIN TYPE
TYPE: ACUTE PAIN

## 2022-06-16 ASSESSMENT — PATIENT HEALTH QUESTIONNAIRE - PHQ9
1. LITTLE INTEREST OR PLEASURE IN DOING THINGS: NOT AT ALL
2. FEELING DOWN, DEPRESSED, IRRITABLE, OR HOPELESS: NOT AT ALL
SUM OF ALL RESPONSES TO PHQ9 QUESTIONS 1 AND 2: 0

## 2022-06-16 NOTE — PROGRESS NOTES
Tele Shift Summary    Rhythm: SB/SR   Rate: 52-65  Ectopy: Rare PVCs and Rare PACs   Measurements: /0.20/0.08/0.44/

## 2022-06-16 NOTE — PROGRESS NOTES
Interval: No acute events.     Medications / Drug list prior to admission:  No current facility-administered medications on file prior to encounter.     Current Outpatient Medications on File Prior to Encounter   Medication Sig Dispense Refill   • busPIRone (BUSPAR) 10 MG Tab tablet Take 10 mg by mouth 2 times a day.     • amLODIPine (NORVASC) 10 MG Tab Take 10 mg by mouth every day.     • mirtazapine (REMERON) 15 MG Tab Take 15 mg by mouth every evening.     • gabapentin (NEURONTIN) 300 MG Cap Take 300 mg by mouth 2 times a day.     • prazosin (MINIPRESS) 1 MG Cap Take 1 mg by mouth 2 times a day.     • pantoprazole (PROTONIX) 40 MG Tablet Delayed Response Take 40 mg by mouth every day.     • hydrOXYzine HCl (ATARAX) 25 MG Tab Take 25 mg by mouth at bedtime. 1-2 tabs at bedtime     • sertraline (ZOLOFT) 50 MG Tab Take 200 mg by mouth every day. Pt's MD adjusted dose to 50 mg x 4 tabs once a day per pt's daughter.     • atorvastatin (LIPITOR) 10 MG Tab Take 10 mg by mouth every evening.     • tizanidine (ZANAFLEX) 4 MG Tab Take 4 mg by mouth as needed.     • ondansetron (ZOFRAN ODT) 4 MG TABLET DISPERSIBLE Take 4 mg by mouth as needed for Nausea.     • prochlorperazine (COMPAZINE) 25 MG Suppos Insert 25 mg into the rectum as needed for Nausea/Vomiting.     • sucralfate (CARAFATE) 1 GM/10ML Suspension Take 10 mg by mouth as needed. Pt's daughter stated it was a 10 mL suspension with a concentration of 1 mg per 1 mL.     • metoclopramide (REGLAN) 10 MG Tab Take 1 Tablet by mouth 4 times a day as needed. (Patient not taking: Reported on 6/14/2022) 30 Tablet 3   • omeprazole (PRILOSEC) 40 MG delayed-release capsule Take 1 Capsule by mouth every day. (Patient not taking: Reported on 6/14/2022) 30 Capsule 3   • hydrocodone-acetaminophen (VICODIN) 5-500 MG TABS Take 1-2 Tabs by mouth every four hours as needed for 20 doses. (Patient not taking: Reported on 6/14/2022) 20 Each 0       Current list of administered  Medications:    Current Facility-Administered Medications:   •  ondansetron (ZOFRAN) syringe/vial injection 4 mg, 4 mg, Intravenous, Once PRN, Juan Mahmood M.D.  •  haloperidol lactate (HALDOL) injection 1 mg, 1 mg, Intravenous, Q15 MIN PRN, Juan Mahmood M.D.  •  diphenhydrAMINE (BENADRYL) injection 12.5 mg, 12.5 mg, Intravenous, Q15 MIN PRN, Juan Mahmood M.D.  •  lactated ringers infusion, , Intravenous, Continuous, Juan Mahmood M.D.  •  fentaNYL (SUBLIMAZE) injection 25 mcg, 25 mcg, Intravenous, Q2 MIN PRN **OR** fentaNYL (SUBLIMAZE) injection 50 mcg, 50 mcg, Intravenous, Q2 MIN PRN, Juan Mahmood M.D., 50 mcg at 06/15/22 1610  •  [MAR Hold] lisinopril (PRINIVIL) tablet 2.5 mg, 2.5 mg, Oral, Q DAY, Duncan Garcia M.D., 2.5 mg at 06/15/22 0613  •  [MAR Hold] ampicillin/sulbactam (UNASYN) 3 g in  mL IVPB, 3 g, Intravenous, Q6HRS, Fabiola Sommer M.D., Stopped at 06/15/22 1209  •  [MAR Hold] insulin GLARGINE (Lantus,Semglee) injection, 27 Units, Subcutaneous, Q EVENING, 27 Units at 06/14/22 1750 **AND** [MAR Hold] insulin lispro (AdmeLOG,HumaLOG) injection, 2-9 Units, Subcutaneous, Q6HRS, 5 Units at 06/14/22 1750 **AND** POC blood glucose manual result, , , Q6H **AND** NOTIFY MD and PharmD, , , Once **AND** Administer 20 grams of glucose (approximately 8 ounces of fruit juice) every 15 minutes PRN FSBG less than 70 mg/dL, , , PRN **AND** [MAR Hold] dextrose 10 % BOLUS 25 g, 25 g, Intravenous, Q15 MIN PRN, Fabiola Sommer M.D.  •  [MAR Hold] metoprolol tartrate (LOPRESSOR) tablet 12.5 mg, 12.5 mg, Oral, TWICE DAILY, Fabiola Sommer M.D., 12.5 mg at 06/14/22 1737  •  [MAR Hold] doxycycline monohydrate (ADOXA) tablet 100 mg, 100 mg, Oral, Q12HRS, Fabiola Sommer M.D., 100 mg at 06/15/22 0612  •  [MAR Hold] insulin lispro (AdmeLOG,HumaLOG) injection, 5 Units, Subcutaneous, TID AC, Fabiola Sommer M.D., 5 Units at 06/15/22 1139  •  [MAR Hold] LORazepam (ATIVAN) injection 0.5 mg, 0.5 mg, Intravenous, Q3HRS PRN,  Herman Fields D.O., 0.5 mg at 06/15/22 0940  •  [MAR Hold] hydrALAZINE (APRESOLINE) injection 5 mg, 5 mg, Intravenous, Q4HRS PRN, Jacqueline Nicole M.D., 5 mg at 06/13/22 1405  •  [MAR Hold] atorvastatin (LIPITOR) tablet 40 mg, 40 mg, Enteral Tube, Q EVENING, Jacqueline Nicole M.D., 40 mg at 06/14/22 1737  •  [MAR Hold] senna-docusate (PERICOLACE or SENOKOT S) 8.6-50 MG per tablet 2 Tablet, 2 Tablet, Enteral Tube, BID, 2 Tablet at 06/14/22 0534 **AND** [MAR Hold] polyethylene glycol/lytes (MIRALAX) PACKET 1 Packet, 1 Packet, Enteral Tube, QDAY PRN **AND** [MAR Hold] magnesium hydroxide (MILK OF MAGNESIA) suspension 30 mL, 30 mL, Enteral Tube, QDAY PRN **AND** [MAR Hold] bisacodyl (DULCOLAX) suppository 10 mg, 10 mg, Rectal, QDAY PRN, Jacqueline Nicole M.D.  •  [MAR Hold] acetaminophen (Tylenol) tablet 650 mg, 650 mg, Enteral Tube, Q6HRS PRN, Jacqueline Nicole M.D., 650 mg at 06/14/22 1204  •  [MAR Hold] ondansetron (ZOFRAN) syringe/vial injection 4 mg, 4 mg, Intravenous, Q4HRS PRN, Jacqueline Nicole M.D., 4 mg at 06/15/22 0850  •  [MAR Hold] ondansetron (ZOFRAN ODT) dispertab 4 mg, 4 mg, Oral, Q4HRS PRN, Jacqueline Nicole M.D., 4 mg at 06/13/22 1459  •  [MAR Hold] pantoprazole (Protonix) injection 40 mg, 40 mg, Intravenous, BID, Jacqueline Nicole M.D., 40 mg at 06/15/22 0613    Past Medical History:   Diagnosis Date   • Diabetes    • Hypertension    • Seizure disorder (HCC)        No past surgical history on file.    No family history on file.  Patient family history was personally reviewed, no pertinent family history to current presentation    Social History     Socioeconomic History   • Marital status:      Spouse name: Not on file   • Number of children: Not on file   • Years of education: Not on file   • Highest education level: Not on file   Occupational History   • Not on file   Tobacco Use   • Smoking status: Current Every Day Smoker   • Smokeless tobacco: Not on file   Substance and Sexual Activity   • Alcohol use: Yes   • Drug use: No  "  • Sexual activity: Not on file   Other Topics Concern   • Not on file   Social History Narrative   • Not on file     Social Determinants of Health     Financial Resource Strain: Not on file   Food Insecurity: Not on file   Transportation Needs: Not on file   Physical Activity: Not on file   Stress: Not on file   Social Connections: Not on file   Intimate Partner Violence: Not on file   Housing Stability: Not on file       ALLERGIES:  Allergies   Allergen Reactions   • Other Drug      Pt states she is allergic to \"ALL\" -javad like lidocaine       Review of systems:  A complete review of symptoms was reviewed with patient. This is reviewed in H&P and PMH. ALL OTHERS reviewed and negative    Physical exam:  Vitals:    06/15/22 1550 06/15/22 1610 06/15/22 1625 06/15/22 1655   BP: (!) 143/54 139/60 (!) 148/54 (!) 142/59   Pulse: 63 63 63 65   Resp: 18 16 17 16   Temp: 36.3 °C (97.3 °F) 36 °C (96.8 °F) 36.1 °C (97 °F) 36.1 °C (97 °F)   TempSrc: Temporal Temporal Temporal Temporal   SpO2: 100% 100% 99% 92%   Weight:       Height:           General: No acute distress.   EYES: no jaundice  HEENT: OP clear   Neck: No bruits No JVD.   CVS:  RRR. S1 + S2. No M/R/G. No edema.  Resp: CTAB. No wheezing or crackles/rhonchi.  Abdomen: Soft, NT, ND,  Skin: Grossly nothing acute no obvious rashes  Neurological: Alert, Moves all extremities, no cranial nerve defects on limited exam  Extremities: Pulse 2+ in b/l LE. No cyanosis.     Data:  Laboratory studies personally reviewed by me:  Recent Results (from the past 24 hour(s))   POCT glucose device results    Collection Time: 06/14/22  5:43 PM   Result Value Ref Range    POC Glucose, Blood 253 (H) 65 - 99 mg/dL   POCT glucose device results    Collection Time: 06/14/22 11:59 PM   Result Value Ref Range    POC Glucose, Blood 142 (H) 65 - 99 mg/dL   Comp Metabolic Panel    Collection Time: 06/15/22  2:15 AM   Result Value Ref Range    Sodium 139 135 - 145 mmol/L    Potassium 3.1 (L) " 3.6 - 5.5 mmol/L    Chloride 103 96 - 112 mmol/L    Co2 28 20 - 33 mmol/L    Anion Gap 8.0 7.0 - 16.0    Glucose 158 (H) 65 - 99 mg/dL    Bun 13 8 - 22 mg/dL    Creatinine 0.91 0.50 - 1.40 mg/dL    Calcium 8.0 (L) 8.4 - 10.2 mg/dL    AST(SGOT) 12 12 - 45 U/L    ALT(SGPT) 9 2 - 50 U/L    Alkaline Phosphatase 66 30 - 99 U/L    Total Bilirubin 0.5 0.1 - 1.5 mg/dL    Albumin 2.6 (L) 3.2 - 4.9 g/dL    Total Protein 5.1 (L) 6.0 - 8.2 g/dL    Globulin 2.5 1.9 - 3.5 g/dL    A-G Ratio 1.0 g/dL   ESTIMATED GFR    Collection Time: 06/15/22  2:15 AM   Result Value Ref Range    GFR (CKD-EPI) 69 >60 mL/min/1.73 m 2   POCT glucose device results    Collection Time: 06/15/22  6:03 AM   Result Value Ref Range    POC Glucose, Blood 148 (H) 65 - 99 mg/dL   POCT glucose device results    Collection Time: 06/15/22 11:34 AM   Result Value Ref Range    POC Glucose, Blood 311 (H) 65 - 99 mg/dL   Histology Request    Collection Time: 06/15/22  3:40 PM   Result Value Ref Range    Pathology Request Sent to Histo        EKG 6/11/22 interpreted by me sinus, nonspecific st wave changes, artifact    All pertinent features of laboratory and imaging reviewed including primary images where applicable    TTE 6/11/22  CONCLUSIONS  Normal left ventricular systolic function.  Moderate concentric left ventricular hypertrophy.  Left Ventricle  Normal left ventricular chamber size. Moderate concentric left   ventricular hypertrophy. Normal left ventricular systolic function. The   left ventricular ejection fraction is visually estimated to be 70%.   Normal regional wall motion. Indeterminate diastolic function.    Principal Problem:    DKA, type 1, not at goal (HCC) POA: Yes  Active Problems:    DM (diabetes mellitus) (HCC) POA: Unknown    Acute renal failure (ARF) (HCC) POA: Unknown    Hypertension POA: Unknown    Chest pain POA: Unknown    Sepsis (HCC) POA: Unknown    Hematemesis POA: Unknown    Aspiration into lower respiratory tract POA: Unknown     Hypokalemia POA: Unknown    NSTEMI (non-ST elevated myocardial infarction) (HCC) POA: Unknown    Pericardial effusion POA: Unknown    Abnormal CT of the abdomen POA: Unknown    Uterine mass POA: Unknown    New onset atrial fibrillation (HCC) POA: Unknown    DKA (diabetic ketoacidosis) (HCC) POA: Unknown    Bilateral pneumonia POA: Unknown    Acute respiratory failure with hypoxemia (HCC) POA: Unknown  Resolved Problems:    * No resolved hospital problems. *      Assessment / Plan:  67 year old woman with diabetes, obesity, hypertension presents with new onset atrial fibrillation with RVR and hypertensive emergency. Also found DKA. Consult for nstemi, deemed likely demand type 2. Stay c/b GIB.    -blood pressure control with ACE or ARB goal 120/80 at least  -when able from bleeding standpoint add doac for cva prevention. Additional p2y12 such as plavix would also be optimal from nstemi perspective unless contraindicated.  - - please scan in strips/evidence of atrial fibrillation  -continue rate control with metoprolol  -repeat ekg today  -mpi spect nuclear pharm stress test when other medical conditions stabilized    I personally discussed her case with Dr Newell    It is my pleasure to participate in the care of Ms. Willis.  Please do not hesitate to contact me with questions or concerns.    Duncan Garcia MD  Cardiologist [facility]

## 2022-06-16 NOTE — PROGRESS NOTES
Hospital Medicine Daily Progress Note    Date of Service  6/16/2022    Chief Complaint  Yaima Willis is a 67 y.o. female admitted 6/11/2022 with DKA    Hospital Course  This is a 68yo female with a histoy of chronic low back pain, diabetes, morbid obesity and HTN who presented to [facility] with generalized pain, chest pain, diarrhea and chills.  She was found to have new onset afib there along with sepsis and TAO. While awaiting transfer to [facility], she also had an episode of hematemesis.  She was admitted to our ICU and DKA resolved.     Interval Problem Update  6/13 - Discussed goals of care with pt and family, pt is DNR/DNI but does not want to pursue comfort care, would be ok with GI scoping if needed. SLP seeing pt this am as there was concern for aspiration as she failed her bedside swallow, but did pass for purees with them. She remains febrile today, will add Doxy for atypical and MRSA coverage as well.  Is on 6L oxymask. Clonidine patch given for HTN, now that pt can eat, will dc Cortrak and add PO Metoprolol.  Regarding the hematemesis PTA, hemoglobins remain stable - question of duodenitis on CT, may have a duodenal ulcer. Will discuss with GI.  Replace K+ IV today, check mag and phos. Pt continues to complain of chest pain, Dr Ramírez to see pt today, appreciated - avoiding ASA for now given her hematemesis and FOB positive status. Discussed wit Dr He, given stability of hemoglobin, plan for outpt scoping unless Cards wanted to perform LHC with possible stents, and then they would consider inpt scoping prior to LHC.     6/14: no acute event overnight. Per card: to coordinate with GI for endoscopy. Per GI to get EGD as outpatient.  hgb stable. Reached out to GI for inpatient EGD    6/15: The patient is having hard time getting GoLytely.  Complain about nausea.  Repleted potassium today.  Plan to have EGD and colonoscopy later today.    6/16: Patient had EGD and colonoscopy done and showed esophagitis,  gastritis and duodenitis, polyps and hemorrhoids and biopsy was taken.  Cardiology to plan for stress test.    I have personally seen and examined the patient at bedside. I discussed the plan of care with patient, family, bedside RN and cardiology.    Consultants/Specialty  cardiology    Code Status  DNAR/DNI    Disposition  Patient is not medically cleared for discharge.   Anticipate discharge to to skilled nursing facility.  I have placed the appropriate orders for post-discharge needs.    Review of Systems  Review of Systems   Constitutional: Positive for malaise/fatigue. Negative for weight loss.   HENT: Negative for congestion, hearing loss and nosebleeds.    Eyes: Negative for pain and discharge.   Respiratory: Positive for cough and shortness of breath. Negative for sputum production and wheezing.    Cardiovascular: Negative for palpitations, orthopnea and claudication.   Gastrointestinal: Negative for abdominal pain, constipation, diarrhea and vomiting.   Genitourinary: Negative for frequency and hematuria.   Musculoskeletal: Negative for back pain, myalgias and neck pain.   Skin: Negative for itching.   Neurological: Negative for focal weakness and seizures.   All other systems reviewed and are negative.       Physical Exam  Temp:  [36 °C (96.8 °F)-37.1 °C (98.7 °F)] 37 °C (98.6 °F)  Pulse:  [54-74] 60  Resp:  [16-18] 18  BP: ()/(50-63) 181/56  SpO2:  [92 %-100 %] 98 %    Physical Exam  Vitals and nursing note reviewed.   Constitutional:       General: She is not in acute distress.     Appearance: Normal appearance. She is obese.   HENT:      Head: Normocephalic and atraumatic.      Right Ear: Tympanic membrane and ear canal normal.      Left Ear: Tympanic membrane and ear canal normal.      Nose: No congestion or rhinorrhea.      Mouth/Throat:      Mouth: Mucous membranes are moist.   Eyes:      Extraocular Movements: Extraocular movements intact.      Conjunctiva/sclera: Conjunctivae normal.       Pupils: Pupils are equal, round, and reactive to light.   Cardiovascular:      Rate and Rhythm: Normal rate and regular rhythm.      Pulses: Normal pulses.      Heart sounds: Normal heart sounds. No murmur heard.    No friction rub. No gallop.   Pulmonary:      Effort: Pulmonary effort is normal. No respiratory distress.      Breath sounds: No stridor. Rales present. No wheezing or rhonchi.      Comments: Very mild increased work of breathing    Abdominal:      General: Abdomen is flat. Bowel sounds are normal. There is no distension.      Palpations: Abdomen is soft. There is no mass.   Musculoskeletal:         General: No swelling, tenderness or deformity.      Cervical back: Normal range of motion and neck supple.   Skin:     General: Skin is warm and dry.      Coloration: Skin is not jaundiced.   Neurological:      General: No focal deficit present.      Mental Status: She is alert.         Fluids    Intake/Output Summary (Last 24 hours) at 6/16/2022 0951  Last data filed at 6/16/2022 0140  Gross per 24 hour   Intake 840 ml   Output 550 ml   Net 290 ml       Laboratory  Recent Labs     06/13/22  1051 06/14/22  0300 06/16/22  0530   WBC 20.5* 14.8* 9.3   RBC 3.96* 3.83* 3.60*   HEMOGLOBIN 10.7* 10.4* 9.7*   HEMATOCRIT 33.9* 32.7* 30.0*   MCV 85.6 85.4 83.3   MCH 27.0 27.2 26.9*   MCHC 31.6* 31.8* 32.3*   RDW 49.3 49.4 47.1   PLATELETCT 259 232 214   MPV 12.2 11.6 11.2     Recent Labs     06/14/22  0300 06/15/22  0215 06/16/22  0530   SODIUM 136 139 136   POTASSIUM 3.6 3.1* 3.4*   CHLORIDE 101 103 102   CO2 27 28 26   GLUCOSE 308* 158* 169*   BUN 21 13 10   CREATININE 1.09 0.91 0.90   CALCIUM 8.3* 8.0* 8.0*                   Imaging  DX-CHEST-PORTABLE (1 VIEW)   Final Result      1.  No acute cardiopulmonary abnormality identified.      2.  Bilateral atelectasis, unchanged      3.  Right midlung zone granuloma      4.  Enlarged cardiac silhouette      5.  Right internal jugular catheter and enteric catheter appear  appropriately located      DX-ABDOMEN FOR TUBE PLACEMENT   Final Result      Nasogastric tube terminates within the gastric body.      CT-ABDOMEN-PELVIS W/O   Final Result      Moderate lower lobe consolidation on the right, peribronchial opacity on the left is worrisome for aspiration pneumonitis favored over pneumonia      Moderate pericardial effusion      Remote granulomatous disease      Subtle paraduodenal, peripancreatic fat stranding could indicate pancreatitis, duodenitis or may just be chronic, age related. Recommend correlation with pancreatic enzymes      Large intrauterine mass most likely is a leiomyoma but given the large size recommended gynecologic surgical referral as there is risk of sarcomatous degeneration when large      Benign right adrenal adenoma does not require follow-up      DX-CHEST-PORTABLE (1 VIEW)   Final Result      1.  Pulmonary vascular prominence with increased interstitial markings are suggestive of edema. There is likely a component of vessel crowding secondary to portable AP technique and low lung volumes.      2.  Right internal jugular catheter is noted with the tip projecting over the superior vena cava. No pneumothorax is identified.      CT-HEAD W/O   Final Result      1.  No acute intracranial findings.      2.  Diffuse atrophy               EC-ECHOCARDIOGRAM COMPLETE W/O CONT   Final Result      DX-CHEST-PORTABLE (1 VIEW)   Final Result      Bibasilar pulmonary opacities, left greater than right, which could be related to atelectasis and/or pneumonitis. Small left pleural effusion.      OUTSIDE IMAGES-DX CHEST   Final Result           Assessment/Plan  Acute respiratory failure with hypoxemia (HCC)  Assessment & Plan  - Secondary to bilateral PNA  - Continue antibiotics with unasyn and doxy      Bilateral pneumonia  Assessment & Plan  - Change Rocephin to Unasyn/Doxy,  - Check MRSA nares : negative    DKA (diabetic ketoacidosis) ([facility])  Assessment & Plan  - A1c 12.3  -  Unknown home regimen - nursing calling pharmacy to reconcile, pt states she takes 70NPH and 15u regular BID   -  this am with 21u Semglee last evening - increase to 28u  - Daughter is going to [facility] to meet with diabetes educator as pt does need further education given her DKA     New onset atrial fibrillation (HCC)  Assessment & Plan  - Not anticoagulating due to FOB positive hematemesis   - oral metoprolol   -To restart anticoagulation once cleared by GI    Uterine mass  Assessment & Plan  - Likely leiomyoma, will refer to Gyn Onc as an outpatient, I did let patient and family know of the finding    Abnormal CT of the abdomen  Assessment & Plan  - Question of pancreatitis vs duodenitis vs normal age related variant  - Lipase normal  - ?? Duodenal ulcer as she also had hematemesis  - BID protonix  -EGD showed duodenitis, gastritis and esophagitis    Pericardial effusion  Assessment & Plan  - Seen on CT but not on echo   - No evidence of tamponade clinically       NSTEMI (non-ST elevated myocardial infarction) ([facility])  Assessment & Plan  - No anticoagulation currently given hematemesis, will optimize medically  - Start beta blocker today - if Bps and renal function stable in the am, start Losartan  - Consult placed to Dr Ramírez   - Peak trop of 724, trending down - recheck today given continued chest pain, but also may be referred from UGI issues and hematemesis    - Echo without WMA   - Card :wants inpatient NPI work up    Hypokalemia  Assessment & Plan  - Replace IV today, check mag and phos    Aspiration into lower respiratory tract  Assessment & Plan  - Failed bedside swallow with nursing, SLP eval ok for purees    Hematemesis  Assessment & Plan  - FOB positive  - Duodenitis seen on CT, suspect possible duodenal ulcer  - GI: EGD showed esophagitis, gastritis and duodenitis, polyps and hemorrhoids  - PPI  - Hemoglobins are stable    Sepsis ([facility])  Assessment & Plan  This is Sepsis Present on admission  SIRS  criteria identified on my evaluation include: Fever, with temperature greater than 101 deg F, Tachycardia, with heart rate greater than 90 BPM, Tachypnea, with respirations greater than 20 per minute and Leukocytosis, with WBC greater than 12,000  Source is Aspiration PNA  Sepsis protocol initiated  Fluid resuscitation ordered per protocol  Crystalloid Fluid Administration: Fluid resuscitation ordered per standard protocol - 30 mL/kg per current or ideal body weight  IV antibiotics as appropriate for source of sepsis  Reassessment: I have reassessed the patient's hemodynamic status    Sputum culture pending, blood cultures negative. Unasyn/Doxy as unclear currently if this is CAP or Aspiration PNA, and pt continues to spike fevers on just Rocephin/Azithro.      Hypertension  Assessment & Plan  - Unknown home meds  - Start beta blocker for afib, add Losartan if renal function stable in the am    Acute renal failure (ARF) (HCC)  Assessment & Plan  - Pre renal due to sepsis, now resolved           VTE prophylaxis: SCDs/TEDs    I have performed a physical exam and reviewed and updated ROS and Plan today (6/16/2022). In review of yesterday's note (6/15/2022), there are no changes except as documented above.

## 2022-06-16 NOTE — PROGRESS NOTES
Monitor Summary:    Rhythm:  SB-SR   Rate Range:  53-67  Ectopy: Rare PVC's, Rare PAC's    Measurements:  0.20/0.08/0.44

## 2022-06-16 NOTE — DIETARY
"Nutrition services: Day 5 of admit.  Asmita Willis is a 67 y.o. female with admitting DX of Diabetic Ketoacidosis    Consult received for poor PO noted on RD follow up      Assessment:  Height: 167.6 cm (5' 6\")  Weight: 107 kg (235 lb 0.2 oz)  Body mass index is 37.93 kg/m²., BMI classification: class 2 obesity  Diet/Intake: Level 4-pureed diet with level 0-thin liquids/refused dinner last night    Evaluation:   1. RD visited pt briefly to discuss poor PO intake. Pt said that she has an appetite. She has no teeth making it difficult to chew. Pt's diet was downgraded. RD also added Boost Glucose Control for additional nutrition. Pt agreeable to receiving this supplement.   2. Pt with EGD and colonoscopy yesterday with report of esophagitis, soft tissue mass at GE junction, duodenitis. Pt is NPO at [facility] for stress test tomorrow.     Malnutrition Risk: criteria not met    Recommendations/Plan:  1. Add Boost Glucose Control to meals TID   2. Encourage intake of >50%  3. Document intake of all meals and supplements as % taken in ADL's to provide interdisciplinary communication across all shifts.   4. Monitor weight.  5. Nutrition rep will continue to see patient for ongoing meal and snack preferences.           RD will follow  "
Nutrition Services: Pt is now on clear liquids in preparation for EGD and colonoscopy tomorrow at 3:00. RD visited pt briefly. She had clear liquid dinner tray at bedside. Pt with consult for DM diet ed. When RD offered education she seemed like she did not understand what RD was asking. She said that she has had DM diet ed in the past. RD will hold off on discussion portion of diet ed until after her procedures tomorrow and after diet advancement. Type 2 DM booklet and CHO counting H/O left at bedside.     Pt said that she was not able to feed herself. CNA aware.     RD will follow up with pt on DM diet ed after her procedure tomorrow.   
Nutrition Services: Pt with consult for tube feed. Since consult was placed on 6/12/22, pt had Level 4-pureed, consistent CHO (diabetic) diet ordered with level 0 thin liquids. Tube feed was stopped. Per nurse, pt has some nausea but has been eating small amounts throughout the day. RD will D/C tube feed orders and informally monitor PO intake to ensure that it is adequate. If it is not, RD will consider order for supplements.     Pt also with consult for DM diet education. Due to current report of nausea, diet ed may not be appropriate today. RD will follow up tomorrow for DM diet education.   
Nutrition Services: Type 2 DM Diet Education Consult   Day 5 of admit.  Asmita Willis is a 67 y.o. female with admitting DX of DKA, type 1, not at goal (HCC) [E10.10]    RD called pt's dtr, Krystyna, to provide Type 2 DM diet education. Krystyna said that she does all the meal prep and food shopping. RD discussed which foods contain CHO, CHO counting, My Plate for DM, inclusion of protein foods with meals and snacks. Label reading info also included in H/O. Handout reinforcing topics left at bedside for dtr. Dtr demonstrated appropriate readiness and expressed evidence of learning. Compliance expected. RD able to answer all questions to patient's satisfaction. RD's [facility] phone number also included on H/O.     No other education needs identified at this time. Consider referral to outpatient nutrition services for continuation of education as indicated or per pt preferences.     Please re-consult RD as indicated.   
Detail Level: Generalized

## 2022-06-16 NOTE — THERAPY
Speech Language Pathology  Daily Treatment     Patient Name: Asmita Willis  Age:  67 y.o., Sex:  female  Medical Record #: [id number]  Today's Date: 6/16/2022     Precautions: Fall Risk, Swallow Precautions ( See Comments)      Assessment  Pt was seen upright in semi-amos's position. Pt self regulated trials of thin liquids by straw and diced peaches. Appropriate oral acceptance and sufficient containment. Inadequate mastication of the peaches achieved with pt eventually spitting out partially mashed boluses. Pt endorsed difficulty d/t lack dentures available. She reported that her family was unable to bring her dentures until tomorrow or Saturday. No overt signs of aspiration noted. Voice was dry pre and post oral intake. Pt c/o nausea and politely declined additional oral intake.    Clinical Impressions  The pt presents with oral deficits 2/2 lack of dentition without dentures available. No pharyngeal dysphagia is suspected. A short term modified diet is indicated. Anticipate diet advancement once patient receives her dentures.       Recommendations  1.  Pureed solids (PU4), Thin liquids (TN0)  2.  Swallowing Instructions & Precautions:   Supervision: Assist with meal tray set up  Positioning: Fully upright and midline during oral intake  Medication: Whole with liquid  Strategies: Small bites/sips, Slow rate of intake  Oral Care: Q4h  3.  SLP to follow for dysphagia management.    Results and recs d/w pt and RN. Thank you.      Plan  Continue current treatment plan.  Discharge Recommendations: Anticipate that the patient will have no further speech therapy needs after discharge from the hospital    Subjective  RN cleared the pt for speech tx, no swallowing issues reported. Diet was advanced to cardiac yesterday (not by SLP). Pt was received awake and alert. She was pleasant and cooperative, c/o nausea. No visitors present.        06/16/22 9608   Charge Group   SLP Swallowing Dysfunction Treatment Swallowing  "Dysfunction Treatment   Total Treatment Time   SLP Time Spent Yes   SLP Swallowing Dysfunction Treatment (Mins) 16   Vitals   O2 (LPM) 2   O2 Delivery Device Silicone Nasal Cannula   Pain 0 - 10 Group   Therapist Pain Assessment Post Activity Pain Same as Prior to Activity   Patient / Family Goals   Patient / Family Goal #1 \"Can I have some milk?\"   Goal #1 Outcome Progressing as expected   Short Term Goals   Short Term Goal # 1 Pt will consume PU4/TN0 without overt signs of aspiration given modA.   Goal Outcome # 1 Progressing as expected     "

## 2022-06-16 NOTE — PROGRESS NOTES
Gastroenterology Progress Note     Author: Ty He D.O.   Date & Time Created: 6/16/2022 10:52 AM    Chief Complaint:  GI bleed    Interval History:  EGD/Colonoscopy done yesterday.  NO overnight events.  Patient to have stress test per cardiology & confirmed by patient.  Patient admits to some epigastric abdominal discomfort.  Patient denies any GI bleeding today.     Review of Systems:  ROS    Physical Exam:  Physical Exam  Constitutional:       Appearance: She is obese.   Eyes:      General: No scleral icterus.  Cardiovascular:      Rate and Rhythm: Normal rate.   Pulmonary:      Effort: Pulmonary effort is normal.   Abdominal:      Comments: +obese, no guarding/rebound, +mild TTP to deep palpation epigastrum    Musculoskeletal:         General: Normal range of motion.   Skin:     General: Skin is warm.      Coloration: Skin is pale.   Neurological:      General: No focal deficit present.      Mental Status: She is alert.   Psychiatric:         Mood and Affect: Mood normal.         Labs:          Recent Labs     06/14/22  0300 06/15/22  0215 06/16/22  0530   SODIUM 136 139 136   POTASSIUM 3.6 3.1* 3.4*   CHLORIDE 101 103 102   CO2 27 28 26   BUN 21 13 10   CREATININE 1.09 0.91 0.90   MAGNESIUM 2.0  --   --    PHOSPHORUS 2.6  --   --    CALCIUM 8.3* 8.0* 8.0*     Recent Labs     06/14/22  0300 06/15/22  0215 06/16/22  0530   ALTSGPT  --  9  --    ASTSGOT  --  12  --    ALKPHOSPHAT  --  66  --    TBILIRUBIN  --  0.5  --    GLUCOSE 308* 158* 169*     Recent Labs     06/14/22 0300 06/16/22  0530   RBC 3.83* 3.60*   HEMOGLOBIN 10.4* 9.7*   HEMATOCRIT 32.7* 30.0*   PLATELETCT 232 214     Recent Labs     06/14/22  0300 06/15/22  0215 06/16/22  0530   WBC 14.8*  --  9.3   NEUTSPOLYS 73.80*  --   --    LYMPHOCYTES 18.30*  --   --    MONOCYTES 6.00  --   --    EOSINOPHILS 0.70  --   --    BASOPHILS 0.20  --   --    ASTSGOT  --  12  --    ALTSGPT  --  9  --    ALKPHOSPHAT  --  66  --    TBILIRUBIN  --  0.5  --       Hemodynamics:  Temp (24hrs), Av.5 °C (97.7 °F), Min:36 °C (96.8 °F), Max:37.1 °C (98.7 °F)  Temperature: 37 °C (98.6 °F)  Pulse  Av  Min: 53  Max: 94   Blood Pressure : (!) 181/56 (RN notified)     Respiratory:    Respiration: 18, Pulse Oximetry: 98 %     Work Of Breathing / Effort: Mild  RUL Breath Sounds: Clear, RML Breath Sounds: Coarse Crackles;Rhonchi, RLL Breath Sounds: Coarse Crackles;Diminished;Rhonchi, NASRIN Breath Sounds: Clear, LLL Breath Sounds: Clear;Diminished;Rhonchi  Fluids:    Intake/Output Summary (Last 24 hours) at 2022 1052  Last data filed at 2022 0140  Gross per 24 hour   Intake 840 ml   Output 550 ml   Net 290 ml        GI/Nutrition:  Orders Placed This Encounter   Procedures   • Diet NPO Restrict to: Ice Chips     Standing Status:   Standing     Number of Occurrences:   1     Order Specific Question:   Diet NPO Restrict to:     Answer:   Ice Chips [2]     Medical Decision Making, by Problem:  Active Hospital Problems    Diagnosis    • *DKA, type 1, not at goal ([facility]) [E10.10]    • Hypokalemia [E87.6]    • NSTEMI (non-ST elevated myocardial infarction) ([facility]) [I21.4]    • Pericardial effusion [I31.3]    • Abnormal CT of the abdomen [R93.5]    • Uterine mass [N85.8]    • New onset atrial fibrillation ([facility]) [I48.91]    • DKA (diabetic ketoacidosis) ([facility]) [E11.10]    • Bilateral pneumonia [J18.9]    • Acute respiratory failure with hypoxemia ([facility]) [J96.01]    • Aspiration into lower respiratory tract [T17.800A]    • DM (diabetes mellitus) ([facility]) [E11.9]    • Acute renal failure (ARF) ([facility]) [N17.9]    • Hypertension [I10]    • Chest pain [R07.9]    • Sepsis ([facility]) [A41.9]    • Hematemesis [K92.0]        Plan:  IMPRESSION:  1.  Severe circumferential grade D reflux esophagitis, extending 15 cm.  2.  Soft tissue mass at the GE junction.  3.  Erosive gastritis.  4.  Duodenitis.  5.  Otherwise, normal upper endoscopy.  6.  Multiple benign appearing polyps, approximately 14, throughout  the colon,   ranging 3-12 cm in size.  7.  Large internal hemorrhoids.  8.  Otherwise, normal colonoscopy.     RECOMMENDATIONS:  The patient may proceed with anticoagulation as had been   planned.  I would recommend IV PPI therapy, and reflux precautions, including   raising of the head of the bed.  She may have her diet advanced as tolerated.    We will follow up on the biopsies.  The patient will need a repeat upper   endoscopy and colonoscopy (for polyp removal), as an outpatient.    We will sign off for now.  For any questions/concerns, changes etc please call.     Quality-Core Measures

## 2022-06-16 NOTE — DISCHARGE PLANNING
Referral sent to [facility], CM RN called to check on pending referral. Spoke to Sendy Kaba asking if pt has PCP and is covid vaccinated. CM RN called pts dtr to complete assessment and inquire about PCP. CM RN called [facility] back with information. No answer, message left for call back.    Spoke to Sendy from [facility], will have MD review referral. Will have answer Monday.       Care Transition Team Assessment    CM called pts dtr to complete assessment. Pt lives with Dtr and spouse. Per dtr pt has walker and drives. PTts room is closest to the bathroom. Pts dtr completes tasks for pt such as grocery shopping as needed. Pt is on 2L of oxygen at [facility]. Pt is full vaccinated for Covid and PCP is Zaynab Sheriff at [facility]    Information Source  Orientation Level: Oriented X4  Information Given By: Other (Comments) (Krystyna)         Elopement Risk  Legal Hold: No  Ambulatory or Self Mobile in Wheelchair: No-Not an Elopement Risk  Elopement Risk: Not at Risk for Elopement    Interdisciplinary Discharge Planning  Lives with - Patient's Self Care Capacity: Spouse, Adult Children  Patient or legal guardian wants to designate a caregiver: No  Housing / Facility: [address] [facility]  Prior Services: None    Discharge Preparedness  What is your plan after discharge?: Skilled nursing facility  What are your discharge supports?: Spouse, Sibling  Prior Functional Level: Ambulatory  Difficulity with ADLs: Walking  Difficulty with ADLs Comment:  (uses FWW- has become weaker)    Functional Assesment  Prior Functional Level: Ambulatory    Finances  Financial Barriers to Discharge: No  Prescription Coverage: Yes                   Domestic Abuse  Have you ever been the victim of abuse or violence?: No  Physical Abuse or Sexual Abuse: No  Verbal Abuse or Emotional Abuse: No  Possible Abuse/Neglect Reported to:: Not Applicable              Anticipated Discharge Information  Discharge Disposition: D/T to SNF with Medicare cert in  anticipation of skilled care (03)

## 2022-06-16 NOTE — OP REPORT
DATE OF SERVICE:  06/15/2022     PROCEDURES:  Upper endoscopy with biopsy, and colonoscopy with biopsy.     INDICATIONS FOR PROCEDURE:  Anemia and GI bleeding.     CONSENT:  Informed consent was obtained directly from the patient after   benefits, risks and possible alternatives were discussed.     MEDICATIONS:  The patient received a propofol-based regimen from Dr. Mahmood   from anesthesia, please see his notes for full details.     DESCRIPTION OF PROCEDURE:  The patient was placed in the left lateral   decubitus position and provided with supplemental oxygen via facemask.  When   ready, the upper endoscope was placed in the patient's mouth, advanced easily   and carefully to the esophagus and subsequently to the stomach and duodenum.     FINDINGS:  In the esophagus, there was severe circumferential grade D reflux   esophagitis extending a total of 15 cm from the GE junction.  Just at the GE   junction, there was a soft tissue mass, which was affecting approximately 1/2   of the circumference of the lumen.  A single biopsy was obtained of this soft   tissue mass.  The total size of this mass was approximately 3 cm in total.    Within the stomach, there was a hiatal hernia, and some erosive gastritis   present in the prepyloric region; otherwise, the stomach appeared normal   including retroflex views of the angulus and cardia.  The duodenum had some   swelling and erythema in the duodenal bulb, though careful inspection did not   reveal any obvious erosions or ulcerations.  More distally, the duodenum   appeared normal.  The scope was then withdrawn and the patient was then   prepared for the colonoscopy.  Digital rectal examination was performed, which   revealed internal and external hemorrhoids.  The colonoscope was inserted   into the rectum and advanced carefully in the usual manner to the cecum, which   was identified by the presence of ileocecal valve and appendiceal orifice.    Photodocumentation was  obtained.     FINDINGS:  There were multiple polyps throughout the colon, totaling   approximately 14.  These were anywhere from 3-12 cm in size, and all appeared   grossly benign.  A single biopsy was obtained from the largest polyp in the   rectal area; otherwise, the polyps were left in place given the high risk of   bleeding of removing these polyps now just prior to planned anticoagulation.    Retroflexion revealed large internal hemorrhoids, otherwise normal.  The scope   was then withdrawn after excess air was removed from the colonic lumen.     COMPLICATIONS:  No complications during or the immediate postoperative period.     IMPRESSION:  1.  Severe circumferential grade D reflux esophagitis, extending 15 cm.  2.  Soft tissue mass at the GE junction.  3.  Erosive gastritis.  4.  Duodenitis.  5.  Otherwise, normal upper endoscopy.  6.  Multiple benign appearing polyps, approximately 14, throughout the colon,   ranging 3-12 cm in size.  7.  Large internal hemorrhoids.  8.  Otherwise, normal colonoscopy.     RECOMMENDATIONS:  The patient may proceed with anticoagulation as had been   planned.  I would recommend IV PPI therapy, and reflux precautions, including   raising of the head of the bed.  She may have her diet advanced as tolerated.    We will follow up on the biopsies.  The patient will need a repeat upper   endoscopy and colonoscopy (for polyp removal), as an outpatient.        ______________________________  MD LUIS BEAVER/PORFIRIO    DD:  06/15/2022 15:41  DT:  06/15/2022 18:37    Job#:  [id number]

## 2022-06-17 ENCOUNTER — APPOINTMENT (OUTPATIENT)
Dept: RADIOLOGY | Facility: MEDICAL CENTER | Age: 67
DRG: 637 | End: 2022-06-17
Attending: INTERNAL MEDICINE
Payer: MEDICARE

## 2022-06-17 LAB
ANION GAP SERPL CALC-SCNC: 9 MMOL/L (ref 7–16)
BASOPHILS # BLD AUTO: 0.2 % (ref 0–1.8)
BASOPHILS # BLD: 0.02 K/UL (ref 0–0.12)
BUN SERPL-MCNC: 8 MG/DL (ref 8–22)
CALCIUM SERPL-MCNC: 8.3 MG/DL (ref 8.4–10.2)
CHLORIDE SERPL-SCNC: 104 MMOL/L (ref 96–112)
CO2 SERPL-SCNC: 25 MMOL/L (ref 20–33)
CREAT SERPL-MCNC: 0.99 MG/DL (ref 0.5–1.4)
EOSINOPHIL # BLD AUTO: 0.13 K/UL (ref 0–0.51)
EOSINOPHIL NFR BLD: 1.4 % (ref 0–6.9)
ERYTHROCYTE [DISTWIDTH] IN BLOOD BY AUTOMATED COUNT: 49.3 FL (ref 35.9–50)
GFR SERPLBLD CREATININE-BSD FMLA CKD-EPI: 63 ML/MIN/1.73 M 2
GLUCOSE BLD STRIP.AUTO-MCNC: 143 MG/DL (ref 65–99)
GLUCOSE BLD STRIP.AUTO-MCNC: 198 MG/DL (ref 65–99)
GLUCOSE BLD STRIP.AUTO-MCNC: 211 MG/DL (ref 65–99)
GLUCOSE BLD STRIP.AUTO-MCNC: 220 MG/DL (ref 65–99)
GLUCOSE BLD STRIP.AUTO-MCNC: 238 MG/DL (ref 65–99)
GLUCOSE SERPL-MCNC: 287 MG/DL (ref 65–99)
HCT VFR BLD AUTO: 32.1 % (ref 37–47)
HGB BLD-MCNC: 10.1 G/DL (ref 12–16)
IMM GRANULOCYTES # BLD AUTO: 0.05 K/UL (ref 0–0.11)
IMM GRANULOCYTES NFR BLD AUTO: 0.5 % (ref 0–0.9)
LYMPHOCYTES # BLD AUTO: 1.5 K/UL (ref 1–4.8)
LYMPHOCYTES NFR BLD: 16.1 % (ref 22–41)
MCH RBC QN AUTO: 26.8 PG (ref 27–33)
MCHC RBC AUTO-ENTMCNC: 31.5 G/DL (ref 33.6–35)
MCV RBC AUTO: 85.1 FL (ref 81.4–97.8)
MONOCYTES # BLD AUTO: 0.96 K/UL (ref 0–0.85)
MONOCYTES NFR BLD AUTO: 10.3 % (ref 0–13.4)
NEUTROPHILS # BLD AUTO: 6.66 K/UL (ref 2–7.15)
NEUTROPHILS NFR BLD: 71.5 % (ref 44–72)
NRBC # BLD AUTO: 0 K/UL
NRBC BLD-RTO: 0 /100 WBC
PLATELET # BLD AUTO: 252 K/UL (ref 164–446)
PMV BLD AUTO: 10.9 FL (ref 9–12.9)
POTASSIUM SERPL-SCNC: 3.7 MMOL/L (ref 3.6–5.5)
RBC # BLD AUTO: 3.77 M/UL (ref 4.2–5.4)
SODIUM SERPL-SCNC: 138 MMOL/L (ref 135–145)
WBC # BLD AUTO: 9.3 K/UL (ref 4.8–10.8)

## 2022-06-17 PROCEDURE — A9270 NON-COVERED ITEM OR SERVICE: HCPCS | Performed by: FAMILY MEDICINE

## 2022-06-17 PROCEDURE — 99232 SBSQ HOSP IP/OBS MODERATE 35: CPT | Performed by: STUDENT IN AN ORGANIZED HEALTH CARE EDUCATION/TRAINING PROGRAM

## 2022-06-17 PROCEDURE — 97530 THERAPEUTIC ACTIVITIES: CPT

## 2022-06-17 PROCEDURE — 97166 OT EVAL MOD COMPLEX 45 MIN: CPT

## 2022-06-17 PROCEDURE — 770020 HCHG ROOM/CARE - TELE (206)

## 2022-06-17 PROCEDURE — A9270 NON-COVERED ITEM OR SERVICE: HCPCS | Performed by: INTERNAL MEDICINE

## 2022-06-17 PROCEDURE — 82962 GLUCOSE BLOOD TEST: CPT | Mod: 91

## 2022-06-17 PROCEDURE — 93018 CV STRESS TEST I&R ONLY: CPT | Performed by: INTERNAL MEDICINE

## 2022-06-17 PROCEDURE — 700111 HCHG RX REV CODE 636 W/ 250 OVERRIDE (IP): Performed by: INTERNAL MEDICINE

## 2022-06-17 PROCEDURE — 700102 HCHG RX REV CODE 250 W/ 637 OVERRIDE(OP): Performed by: INTERNAL MEDICINE

## 2022-06-17 PROCEDURE — 78452 HT MUSCLE IMAGE SPECT MULT: CPT | Mod: 26 | Performed by: INTERNAL MEDICINE

## 2022-06-17 PROCEDURE — 700111 HCHG RX REV CODE 636 W/ 250 OVERRIDE (IP)

## 2022-06-17 PROCEDURE — 700111 HCHG RX REV CODE 636 W/ 250 OVERRIDE (IP): Performed by: STUDENT IN AN ORGANIZED HEALTH CARE EDUCATION/TRAINING PROGRAM

## 2022-06-17 PROCEDURE — 97116 GAIT TRAINING THERAPY: CPT

## 2022-06-17 PROCEDURE — 700102 HCHG RX REV CODE 250 W/ 637 OVERRIDE(OP): Performed by: STUDENT IN AN ORGANIZED HEALTH CARE EDUCATION/TRAINING PROGRAM

## 2022-06-17 PROCEDURE — C9113 INJ PANTOPRAZOLE SODIUM, VIA: HCPCS | Performed by: INTERNAL MEDICINE

## 2022-06-17 PROCEDURE — 80048 BASIC METABOLIC PNL TOTAL CA: CPT

## 2022-06-17 PROCEDURE — 700111 HCHG RX REV CODE 636 W/ 250 OVERRIDE (IP): Performed by: FAMILY MEDICINE

## 2022-06-17 PROCEDURE — 700102 HCHG RX REV CODE 250 W/ 637 OVERRIDE(OP): Performed by: FAMILY MEDICINE

## 2022-06-17 PROCEDURE — 85025 COMPLETE CBC W/AUTO DIFF WBC: CPT

## 2022-06-17 PROCEDURE — 99233 SBSQ HOSP IP/OBS HIGH 50: CPT | Performed by: INTERNAL MEDICINE

## 2022-06-17 PROCEDURE — 700105 HCHG RX REV CODE 258: Performed by: FAMILY MEDICINE

## 2022-06-17 PROCEDURE — A9502 TC99M TETROFOSMIN: HCPCS

## 2022-06-17 PROCEDURE — A9270 NON-COVERED ITEM OR SERVICE: HCPCS | Performed by: STUDENT IN AN ORGANIZED HEALTH CARE EDUCATION/TRAINING PROGRAM

## 2022-06-17 RX ORDER — REGADENOSON 0.08 MG/ML
INJECTION, SOLUTION INTRAVENOUS
Status: COMPLETED
Start: 2022-06-17 | End: 2022-06-17

## 2022-06-17 RX ORDER — LISINOPRIL 5 MG/1
5 TABLET ORAL
Status: DISCONTINUED | OUTPATIENT
Start: 2022-06-18 | End: 2022-06-18 | Stop reason: HOSPADM

## 2022-06-17 RX ORDER — REGADENOSON 0.08 MG/ML
0.4 INJECTION, SOLUTION INTRAVENOUS ONCE
Status: DISPENSED | OUTPATIENT
Start: 2022-06-17 | End: 2022-06-18

## 2022-06-17 RX ORDER — PROCHLORPERAZINE EDISYLATE 5 MG/ML
10 INJECTION INTRAMUSCULAR; INTRAVENOUS EVERY 6 HOURS PRN
Status: DISCONTINUED | OUTPATIENT
Start: 2022-06-17 | End: 2022-06-18 | Stop reason: HOSPADM

## 2022-06-17 RX ORDER — LORAZEPAM 2 MG/ML
0.5 INJECTION INTRAMUSCULAR
Status: DISCONTINUED | OUTPATIENT
Start: 2022-06-17 | End: 2022-06-18 | Stop reason: HOSPADM

## 2022-06-17 RX ADMIN — SODIUM CHLORIDE 3 G: 900 INJECTION INTRAVENOUS at 23:13

## 2022-06-17 RX ADMIN — METOPROLOL TARTRATE 12.5 MG: 25 TABLET, FILM COATED ORAL at 17:54

## 2022-06-17 RX ADMIN — INSULIN LISPRO 3 UNITS: 100 INJECTION, SOLUTION INTRAVENOUS; SUBCUTANEOUS at 00:34

## 2022-06-17 RX ADMIN — DOXYCYCLINE 100 MG: 100 TABLET, FILM COATED ORAL at 05:28

## 2022-06-17 RX ADMIN — APIXABAN 5 MG: 5 TABLET, FILM COATED ORAL at 11:45

## 2022-06-17 RX ADMIN — LISINOPRIL 2.5 MG: 2.5 TABLET ORAL at 05:35

## 2022-06-17 RX ADMIN — INSULIN LISPRO 5 UNITS: 100 INJECTION, SOLUTION INTRAVENOUS; SUBCUTANEOUS at 17:42

## 2022-06-17 RX ADMIN — LORAZEPAM 0.5 MG: 2 INJECTION INTRAMUSCULAR; INTRAVENOUS at 15:44

## 2022-06-17 RX ADMIN — SODIUM CHLORIDE 3 G: 900 INJECTION INTRAVENOUS at 17:52

## 2022-06-17 RX ADMIN — INSULIN LISPRO 2 UNITS: 100 INJECTION, SOLUTION INTRAVENOUS; SUBCUTANEOUS at 17:42

## 2022-06-17 RX ADMIN — PANTOPRAZOLE SODIUM 40 MG: 40 INJECTION, POWDER, FOR SOLUTION INTRAVENOUS at 05:27

## 2022-06-17 RX ADMIN — INSULIN LISPRO 3 UNITS: 100 INJECTION, SOLUTION INTRAVENOUS; SUBCUTANEOUS at 23:12

## 2022-06-17 RX ADMIN — LORAZEPAM 0.5 MG: 2 INJECTION INTRAMUSCULAR; INTRAVENOUS at 11:29

## 2022-06-17 RX ADMIN — REGADENOSON 0.4 MG: 0.08 INJECTION, SOLUTION INTRAVENOUS at 09:50

## 2022-06-17 RX ADMIN — DOXYCYCLINE 100 MG: 100 TABLET, FILM COATED ORAL at 17:54

## 2022-06-17 RX ADMIN — PROCHLORPERAZINE EDISYLATE 10 MG: 5 INJECTION INTRAMUSCULAR; INTRAVENOUS at 18:07

## 2022-06-17 RX ADMIN — ONDANSETRON 4 MG: 2 INJECTION INTRAMUSCULAR; INTRAVENOUS at 09:21

## 2022-06-17 RX ADMIN — PANTOPRAZOLE SODIUM 40 MG: 40 INJECTION, POWDER, FOR SOLUTION INTRAVENOUS at 17:53

## 2022-06-17 RX ADMIN — APIXABAN 5 MG: 5 TABLET, FILM COATED ORAL at 17:55

## 2022-06-17 RX ADMIN — SODIUM CHLORIDE 3 G: 900 INJECTION INTRAVENOUS at 05:25

## 2022-06-17 RX ADMIN — INSULIN LISPRO 5 UNITS: 100 INJECTION, SOLUTION INTRAVENOUS; SUBCUTANEOUS at 11:47

## 2022-06-17 RX ADMIN — SODIUM CHLORIDE 3 G: 900 INJECTION INTRAVENOUS at 00:27

## 2022-06-17 RX ADMIN — SODIUM CHLORIDE 3 G: 900 INJECTION INTRAVENOUS at 11:29

## 2022-06-17 RX ADMIN — INSULIN LISPRO 3 UNITS: 100 INJECTION, SOLUTION INTRAVENOUS; SUBCUTANEOUS at 11:46

## 2022-06-17 RX ADMIN — ATORVASTATIN CALCIUM 40 MG: 40 TABLET, FILM COATED ORAL at 17:54

## 2022-06-17 ASSESSMENT — GAIT ASSESSMENTS
ASSISTIVE DEVICE: FRONT WHEEL WALKER
DEVIATION: STEP TO
GAIT LEVEL OF ASSIST: CONTACT GUARD ASSIST
DISTANCE (FEET): 20

## 2022-06-17 ASSESSMENT — COGNITIVE AND FUNCTIONAL STATUS - GENERAL
DRESSING REGULAR LOWER BODY CLOTHING: A LOT
MOVING FROM LYING ON BACK TO SITTING ON SIDE OF FLAT BED: A LITTLE
DAILY ACTIVITIY SCORE: 12
STANDING UP FROM CHAIR USING ARMS: A LITTLE
DRESSING REGULAR UPPER BODY CLOTHING: A LOT
HELP NEEDED FOR BATHING: A LOT
SUGGESTED CMS G CODE MODIFIER DAILY ACTIVITY: CL
PERSONAL GROOMING: A LOT
CLIMB 3 TO 5 STEPS WITH RAILING: A LOT
EATING MEALS: A LOT
SUGGESTED CMS G CODE MODIFIER MOBILITY: CK
MOBILITY SCORE: 19
WALKING IN HOSPITAL ROOM: A LITTLE
TOILETING: A LOT

## 2022-06-17 ASSESSMENT — CHA2DS2 SCORE
PRIOR STROKE OR TIA OR THROMBOEMBOLISM: NO
DIABETES: YES
SEX: FEMALE
VASCULAR DISEASE: NO
CHA2DS2 VASC SCORE: 4
CHF OR LEFT VENTRICULAR DYSFUNCTION: NO
AGE 75 OR GREATER: NO
HYPERTENSION: YES
AGE 65 TO 74: YES

## 2022-06-17 ASSESSMENT — ENCOUNTER SYMPTOMS
WHEEZING: 0
BACK PAIN: 0
CONSTIPATION: 0
NECK PAIN: 0
CLAUDICATION: 0
EYE DISCHARGE: 0
ABDOMINAL PAIN: 0
MYALGIAS: 0
VOMITING: 0
FOCAL WEAKNESS: 0
SPUTUM PRODUCTION: 0
ORTHOPNEA: 0
WEIGHT LOSS: 0
COUGH: 1
PALPITATIONS: 0
SEIZURES: 0
EYE PAIN: 0
DIARRHEA: 0
SHORTNESS OF BREATH: 1

## 2022-06-17 ASSESSMENT — FIBROSIS 4 INDEX: FIB4 SCORE: 1.23

## 2022-06-17 ASSESSMENT — ACTIVITIES OF DAILY LIVING (ADL): TOILETING: REQUIRES ASSIST

## 2022-06-17 ASSESSMENT — PAIN DESCRIPTION - PAIN TYPE
TYPE: ACUTE PAIN
TYPE: ACUTE PAIN

## 2022-06-17 NOTE — PROGRESS NOTES
Telemetry shift summary:  Rhythm: Sr, SB     HR Range: 50s to 60s      Measurements from strip printed at 03:31:59   NM: 0.20   QRS 0.08   QT 0.44     Ectopies: rare PVC.

## 2022-06-17 NOTE — THERAPY
Missed Therapy     Patient Name: Asmita Willis  Age:  67 y.o., Sex:  female  Medical Record #: [id number]  Today's Date: 6/17/2022    Discussed missed therapy with RN       06/17/22 0948   Treatment Variance   Reason For Missed Therapy Medical - Patient  in Procedure   Interdisciplinary Plan of Care Collaboration   IDT Collaboration with  Nursing   Collaboration Comments Per RN, pt is currently NPO for stress test. Family to bring in dentues either later today or tomorrow so SLP to reassess when they are available.

## 2022-06-17 NOTE — CARE PLAN
The patient is Stable - Low risk of patient condition declining or worsening    Shift Goals  Clinical Goals: Pt will be free on n/v at the end of the shift  Patient Goals: Sleep  Family Goals: No family present    Progress made toward(s) clinical / shift goals:  Zofran given at the beginning of the shift for one episode of nausea and one 50 ml emesis. N/V resolved so far after that dose.     Patient is not progressing towards the following goals:      Problem: Knowledge Deficit - Standard  Goal: Patient and family/care givers will demonstrate understanding of plan of care, disease process/condition, diagnostic tests and medications  Outcome: Progressing     Problem: Pain - Standard  Goal: Alleviation of pain or a reduction in pain to the patient’s comfort goal  Outcome: Progressing     Problem: Skin Integrity  Goal: Skin integrity is maintained or improved  Outcome: Progressing     Problem: Fall Risk  Goal: Patient will remain free from falls  Outcome: Progressing     Problem: Risk for Fluid Volume Deficit Related to Bleeding  Goal: Fluid volume balance will be maintained  Outcome: Progressing  Goal: Patient will show no signs and symptoms of excessive bleeding  Outcome: Progressing

## 2022-06-17 NOTE — PROGRESS NOTES
Hospital Medicine Daily Progress Note    Date of Service  6/17/2022    Chief Complaint  Yaima Willis is a 67 y.o. female admitted 6/11/2022 with DKA    Hospital Course  This is a 66yo female with a histoy of chronic low back pain, diabetes, morbid obesity and HTN who presented to [facility] with generalized pain, chest pain, diarrhea and chills.  She was found to have new onset afib there along with sepsis and TAO. While awaiting transfer to [facility], she also had an episode of hematemesis.  She was admitted to our ICU and DKA resolved.     Interval Problem Update  No acute events overnight.  Stress test today shows no signs of reversible ischemia, normal EF.  On PPI for esophagitis/gastritis/duodenitis.  Patient reports some nausea, prn compazine ordered.  On unasyn and doxycycline for pneumonia.  On 2L oxygen, continue to wean as tolerated.  Patient is medically cleared for discharge to SNF.      I have personally seen and examined the patient at bedside. I discussed the plan of care with patient, family, bedside RN and cardiology.    Consultants/Specialty  cardiology    Code Status  DNAR/DNI    Disposition  Patient is medically cleared for discharge.   Anticipate discharge to to skilled nursing facility.  I have placed the appropriate orders for post-discharge needs.    Review of Systems  Review of Systems   Constitutional: Positive for malaise/fatigue. Negative for weight loss.   HENT: Negative for congestion, hearing loss and nosebleeds.    Eyes: Negative for pain and discharge.   Respiratory: Positive for cough and shortness of breath. Negative for sputum production and wheezing.    Cardiovascular: Negative for palpitations, orthopnea and claudication.   Gastrointestinal: Negative for abdominal pain, constipation, diarrhea and vomiting.   Genitourinary: Negative for frequency and hematuria.   Musculoskeletal: Negative for back pain, myalgias and neck pain.   Skin: Negative for itching.   Neurological: Negative  for focal weakness and seizures.   All other systems reviewed and are negative.       Physical Exam  Temp:  [36.4 °C (97.6 °F)-37.3 °C (99.2 °F)] 37.1 °C (98.7 °F)  Pulse:  [58-74] 66  Resp:  [16-18] 17  BP: (134-172)/(50-91) 160/91  SpO2:  [95 %-98 %] 97 %    Physical Exam  Vitals and nursing note reviewed.   Constitutional:       General: She is not in acute distress.     Appearance: Normal appearance. She is obese.   HENT:      Head: Normocephalic and atraumatic.      Right Ear: Tympanic membrane and ear canal normal.      Left Ear: Tympanic membrane and ear canal normal.      Nose: No congestion or rhinorrhea.      Mouth/Throat:      Mouth: Mucous membranes are moist.   Eyes:      Extraocular Movements: Extraocular movements intact.      Conjunctiva/sclera: Conjunctivae normal.      Pupils: Pupils are equal, round, and reactive to light.   Cardiovascular:      Rate and Rhythm: Normal rate and regular rhythm.      Pulses: Normal pulses.      Heart sounds: Normal heart sounds. No murmur heard.    No friction rub. No gallop.   Pulmonary:      Effort: Pulmonary effort is normal. No respiratory distress.      Breath sounds: No stridor. Rales present. No wheezing or rhonchi.      Comments: Very mild increased work of breathing    Abdominal:      General: Abdomen is flat. Bowel sounds are normal. There is no distension.      Palpations: Abdomen is soft. There is no mass.   Musculoskeletal:         General: No swelling, tenderness or deformity.      Cervical back: Normal range of motion and neck supple.   Skin:     General: Skin is warm and dry.      Coloration: Skin is not jaundiced.   Neurological:      General: No focal deficit present.      Mental Status: She is alert.         Fluids    Intake/Output Summary (Last 24 hours) at 6/17/2022 1325  Last data filed at 6/17/2022 0525  Gross per 24 hour   Intake 340 ml   Output 950 ml   Net -610 ml       Laboratory  Recent Labs     06/16/22  0530 06/17/22  1130   WBC 9.3 9.3    RBC 3.60* 3.77*   HEMOGLOBIN 9.7* 10.1*   HEMATOCRIT 30.0* 32.1*   MCV 83.3 85.1   MCH 26.9* 26.8*   MCHC 32.3* 31.5*   RDW 47.1 49.3   PLATELETCT 214 252   MPV 11.2 10.9     Recent Labs     06/15/22  0215 06/16/22  0530 06/17/22  1130   SODIUM 139 136 138   POTASSIUM 3.1* 3.4* 3.7   CHLORIDE 103 102 104   CO2 28 26 25   GLUCOSE 158* 169* 287*   BUN 13 10 8   CREATININE 0.91 0.90 0.99   CALCIUM 8.0* 8.0* 8.3*                   Imaging  NM-CARDIAC STRESS TEST   Final Result      DX-CHEST-PORTABLE (1 VIEW)   Final Result      1.  No acute cardiopulmonary abnormality identified.      2.  Bilateral atelectasis, unchanged      3.  Right midlung zone granuloma      4.  Enlarged cardiac silhouette      5.  Right internal jugular catheter and enteric catheter appear appropriately located      DX-ABDOMEN FOR TUBE PLACEMENT   Final Result      Nasogastric tube terminates within the gastric body.      CT-ABDOMEN-PELVIS W/O   Final Result      Moderate lower lobe consolidation on the right, peribronchial opacity on the left is worrisome for aspiration pneumonitis favored over pneumonia      Moderate pericardial effusion      Remote granulomatous disease      Subtle paraduodenal, peripancreatic fat stranding could indicate pancreatitis, duodenitis or may just be chronic, age related. Recommend correlation with pancreatic enzymes      Large intrauterine mass most likely is a leiomyoma but given the large size recommended gynecologic surgical referral as there is risk of sarcomatous degeneration when large      Benign right adrenal adenoma does not require follow-up      DX-CHEST-PORTABLE (1 VIEW)   Final Result      1.  Pulmonary vascular prominence with increased interstitial markings are suggestive of edema. There is likely a component of vessel crowding secondary to portable AP technique and low lung volumes.      2.  Right internal jugular catheter is noted with the tip projecting over the superior vena cava. No  pneumothorax is identified.      CT-HEAD W/O   Final Result      1.  No acute intracranial findings.      2.  Diffuse atrophy               EC-ECHOCARDIOGRAM COMPLETE W/O CONT   Final Result      DX-CHEST-PORTABLE (1 VIEW)   Final Result      Bibasilar pulmonary opacities, left greater than right, which could be related to atelectasis and/or pneumonitis. Small left pleural effusion.      OUTSIDE IMAGES-DX CHEST   Final Result           Assessment/Plan  Acute respiratory failure with hypoxemia (HCC)  Assessment & Plan  - Secondary to bilateral PNA  - Continue antibiotics with unasyn and doxy      Bilateral pneumonia  Assessment & Plan  - Change Rocephin to Unasyn/Doxy,  - Check MRSA nares : negative    DKA (diabetic ketoacidosis) ([facility])  Assessment & Plan  - A1c 12.3  - Unknown home regimen - nursing calling pharmacy to reconcile, pt states she takes 70NPH and 15u regular BID   -  this am with 21u Semglee last evening - increase to 28u  - Daughter is going to [facility] to meet with diabetes educator as pt does need further education given her DKA     New onset atrial fibrillation ([facility])  Assessment & Plan  - Not anticoagulating due to FOB positive hematemesis   - oral metoprolol   -To restart anticoagulation once cleared by GI    Uterine mass  Assessment & Plan  - Likely leiomyoma, will refer to Gyn Onc as an outpatient, I did let patient and family know of the finding    Abnormal CT of the abdomen  Assessment & Plan  - Question of pancreatitis vs duodenitis vs normal age related variant  - Lipase normal  - ?? Duodenal ulcer as she also had hematemesis  - BID protonix  -EGD showed duodenitis, gastritis and esophagitis    Pericardial effusion  Assessment & Plan  - Seen on CT but not on echo   - No evidence of tamponade clinically       NSTEMI (non-ST elevated myocardial infarction) ([facility])  Assessment & Plan  - No anticoagulation currently given hematemesis, will optimize medically  - Start beta blocker today - if Bps  and renal function stable in the am, start Losartan  - Consult placed to Dr Ramírez   - Peak trop of 724, trending down - recheck today given continued chest pain, but also may be referred from UGI issues and hematemesis    - Echo without WMA   - Card :wants inpatient NPI work up    Hypokalemia  Assessment & Plan  - Replace IV today, check mag and phos    Aspiration into lower respiratory tract  Assessment & Plan  - Failed bedside swallow with nursing, SLP eval ok for purees    Hematemesis  Assessment & Plan  - FOB positive  - Duodenitis seen on CT, suspect possible duodenal ulcer  - GI: EGD showed esophagitis, gastritis and duodenitis, polyps and hemorrhoids  - PPI  - Hemoglobins are stable    Sepsis (HCC)  Assessment & Plan  This is Sepsis Present on admission  SIRS criteria identified on my evaluation include: Fever, with temperature greater than 101 deg F, Tachycardia, with heart rate greater than 90 BPM, Tachypnea, with respirations greater than 20 per minute and Leukocytosis, with WBC greater than 12,000  Source is Aspiration PNA  Sepsis protocol initiated  Fluid resuscitation ordered per protocol  Crystalloid Fluid Administration: Fluid resuscitation ordered per standard protocol - 30 mL/kg per current or ideal body weight  IV antibiotics as appropriate for source of sepsis  Reassessment: I have reassessed the patient's hemodynamic status    Sputum culture pending, blood cultures negative. Unasyn/Doxy as unclear currently if this is CAP or Aspiration PNA, and pt continues to spike fevers on just Rocephin/Azithro.      Chest pain  Assessment & Plan  Negative stress test 6/17    Hypertension  Assessment & Plan  On metoprolol and lisinopril    Acute renal failure (ARF) (HCC)  Assessment & Plan  - Pre renal due to sepsis, now resolved           VTE prophylaxis: SCDs/TEDs    I have performed a physical exam and reviewed and updated ROS and Plan today (6/17/2022). In review of yesterday's note (6/16/2022), there are  no changes except as documented above.

## 2022-06-17 NOTE — CARE PLAN
The patient is Watcher - Medium risk of patient condition declining or worsening    Shift Goals  Clinical Goals: n/v control  Patient Goals: sleep  Family Goals: sleep    Progress made toward(s) clinical / shift goals:    Problem: Knowledge Deficit - Standard  Goal: Patient and family/care givers will demonstrate understanding of plan of care, disease process/condition, diagnostic tests and medications  Outcome: Progressing     Problem: Pain - Standard  Goal: Alleviation of pain or a reduction in pain to the patient’s comfort goal  Outcome: Progressing       Patient is not progressing towards the following goals:

## 2022-06-17 NOTE — THERAPY
Physical Therapy   Daily Treatment     Patient Name: Asmita Willis  Age:  67 y.o., Sex:  female  Medical Record #: [id number]  Today's Date: 6/17/2022     Precautions  Precautions: Fall Risk;Swallow Precautions ( See Comments)    Assessment    Pt doing a lot better with bed mob,transfers and ambulation    Plan    Continue current treatment plan.   06/17/22 1300   Charge Group   PT Gait Training 1   PT Therapeutic Activities 1   Total Time Spent   PT Total Time Yes   PT Gait Training Time Spent (Mins) 15   PT Therapeutic Activities Time Spent (Mins) 15   Supine Lower Body Exercise   Supine Lower Body Exercises Yes   Sitting Lower Body Exercises   Sitting Lower Body Exercises Yes   Balance   Sitting Balance (Static) Fair +   Sitting Balance (Dynamic) Fair   Standing Balance (Static) Fair -   Standing Balance (Dynamic) Fair -   Weight Shift Sitting Fair   Weight Shift Standing Fair   Gait Analysis   Gait Level Of Assist Contact Guard Assist   Assistive Device Front Wheel Walker   Distance (Feet) 20   # of Times Distance was Traveled 1   Deviation Step To   Weight Bearing Status full   Bed Mobility    Supine to Sit Modified Independent   Functional Mobility   Sit to Stand Contact Guard Assist   Bed, Chair, Wheelchair Transfer Minimal Assist   Transfer Method Stand Step   How much difficulty does the patient currently have...   Turning over in bed (including adjusting bedclothes, sheets and blankets)? 4   Sitting down on and standing up from a chair with arms (e.g., wheelchair, bedside commode, etc.) 3   Moving from lying on back to sitting on the side of the bed? 4   How much help from another person does the patient currently need...   Moving to and from a bed to a chair (including a wheelchair)? 3   Need to walk in a hospital room? 3   Climbing 3-5 steps with a railing? 2   6 clicks Mobility Score 19   Activity Tolerance   Sitting Edge of Bed 10   Standing 5   Short Term Goals    Short Term Goal # 1 Pt will be able  to perform bed mobility and sup <> sit Cresencio in 6 visits.   Goal Outcome # 1 Progressing as expected   Short Term Goal # 2 Pt will be able to perform sit <> stand and transfer Cresencio in 6 visits.   Goal Outcome # 2 Progressing as expected   Short Term Goal # 3 Pt will be able to ambulate 50 ft with FWW Cresencio in 6 visits.   Goal Outcome # 3 Progressing as expected   Anticipated Discharge Equipment and Recommendations   Discharge Recommendations Recommend post-acute placement for additional physical therapy services prior to discharge home   Interdisciplinary Plan of Care Collaboration   IDT Collaboration with  Nursing   Session Information   Date / Session Number  6/17-2 2/4 6/20       DC Equipment Recommendations: Unable to determine at this time  Discharge Recommendations: (P) Recommend post-acute placement for additional physical therapy services prior to discharge home

## 2022-06-17 NOTE — PROGRESS NOTES
Pt awaken for BS check and VS. She denies any pain or discomfort at this time. No changes from previous assessments. Safety measures in place.

## 2022-06-17 NOTE — THERAPY
Occupational Therapy   Initial Evaluation     Patient Name: Asmita Willis  Age:  67 y.o., Sex:  female  Medical Record #: [id number]  Today's Date: 6/17/2022     Precautions: (P) Fall Risk, Swallow Precautions ( See Comments)    Assessment  Patient is 67 y.o. female admitted with DKA with hx of chronic low back pain, diabetes, morbid obesity seen for OT evaluation. Pt demonstrated mod A for self cares and mobility today including BSC txf, LB dressing, sitting G/H. Pt has been requiring increased assist the last month from spouse and dtr for self cares and mobility. At this time recommend post acute placement. Will follow for skilled OT services.    Plan    Recommend Occupational Therapy 3 times per week until therapy goals are met for the following treatments:  Adaptive Equipment, Cognitive Skill Development, Neuro Re-Education / Balance, Self Care/Activities of Daily Living, Therapeutic Activities and Therapeutic Exercises.    DC Equipment Recommendations: (P) Unable to determine at this time  Discharge Recommendations: (P) Recommend post-acute placement for additional occupational therapy services prior to discharge home        06/17/22 1215   Prior Living Situation   Prior Services None   Housing / Facility [address] [facility]   Bathroom Set up Walk In Shower;Bathtub / Shower Combination;Shower Chair   Equipment Owned 4-Wheel Walker;Tub / Shower Seat   Lives with - Patient's Self Care Capacity Spouse;Adult Children   Comments Pt has been requring increased assist the last month   Prior Level of ADL Function   Self Feeding Independent   Grooming / Hygiene Requires Assist   Bathing Requires Assist   Dressing Requires Assist   Toileting Requires Assist   Prior Level of IADL Function   Medication Management Requires Assist   Laundry Requires Assist   Kitchen Mobility Requires Assist   Finances Requires Assist   Home Management Requires Assist   Shopping Requires Assist   Precautions   Precautions Fall Risk;Swallow  Precautions ( See Comments)   Cognition    Cognition / Consciousness WDL   Comments pleasant and agreeable, lethargic and delayed   Active ROM Upper Body   Active ROM Upper Body  WDL   Strength Upper Body   Upper Body Strength  X   Comments grossly 4/5 BUE strength   Balance Assessment   Sitting Balance (Static) Fair   Sitting Balance (Dynamic) Fair   Standing Balance (Static) Fair -   Standing Balance (Dynamic) Poor +   Weight Shift Sitting Fair   Weight Shift Standing Fair   Bed Mobility    Supine to Sit Minimal Assist   Sit to Supine Minimal Assist   Scooting Minimal Assist   ADL Assessment   Grooming Minimal Assist;Seated   Lower Body Dressing Maximal Assist   Toileting Maximal Assist  (on BSC)   How much help from another person does the patient currently need...   6 Clicks Daily Activity Score 12   Functional Mobility   Sit to Stand Moderate Assist   Toilet Transfers Moderate Assist   Mobility BSC txf from EOB   Patient / Family Goals   Patient / Family Goal #1 to get stronger   Short Term Goals   Short Term Goal # 1 Pt will demo toilet txf SPV   Short Term Goal # 2 Pt will complete LB dressing SPV   Short Term Goal # 3 Pt will tolerate 15 min seated G/H SPV   Education Group   Role of Occupational Therapist Patient Response Patient;Acceptance;Explanation   Problem List   Problem List Decreased Active Daily Living Skills;Decreased Homemaking Skills;Decreased Upper Extremity Strength Right;Decreased Upper Extremity Strength Left;Decreased Functional Mobility;Decreased Activity Tolerance;Safety Awareness Deficits / Cognition;Impaired Cognitive Function;Impaired Postural Control / Balance   Anticipated Discharge Equipment and Recommendations   DC Equipment Recommendations Unable to determine at this time   Discharge Recommendations Recommend post-acute placement for additional occupational therapy services prior to discharge home

## 2022-06-17 NOTE — PROGRESS NOTES
Interval: Covering cardiologist yesterday Dr Ramírez. No acute events overnight.     Medications / Drug list prior to admission:  No current facility-administered medications on file prior to encounter.     Current Outpatient Medications on File Prior to Encounter   Medication Sig Dispense Refill   • busPIRone (BUSPAR) 10 MG Tab tablet Take 10 mg by mouth 2 times a day.     • amLODIPine (NORVASC) 10 MG Tab Take 10 mg by mouth every day.     • mirtazapine (REMERON) 15 MG Tab Take 15 mg by mouth every evening.     • gabapentin (NEURONTIN) 300 MG Cap Take 300 mg by mouth 2 times a day.     • prazosin (MINIPRESS) 1 MG Cap Take 1 mg by mouth 2 times a day.     • pantoprazole (PROTONIX) 40 MG Tablet Delayed Response Take 40 mg by mouth every day.     • hydrOXYzine HCl (ATARAX) 25 MG Tab Take 25 mg by mouth at bedtime. 1-2 tabs at bedtime     • sertraline (ZOLOFT) 50 MG Tab Take 200 mg by mouth every day. Pt's MD adjusted dose to 50 mg x 4 tabs once a day per pt's daughter.     • atorvastatin (LIPITOR) 10 MG Tab Take 10 mg by mouth every evening.     • tizanidine (ZANAFLEX) 4 MG Tab Take 4 mg by mouth as needed.     • ondansetron (ZOFRAN ODT) 4 MG TABLET DISPERSIBLE Take 4 mg by mouth as needed for Nausea.     • prochlorperazine (COMPAZINE) 25 MG Suppos Insert 25 mg into the rectum as needed for Nausea/Vomiting.     • sucralfate (CARAFATE) 1 GM/10ML Suspension Take 10 mg by mouth as needed. Pt's daughter stated it was a 10 mL suspension with a concentration of 1 mg per 1 mL.     • metoclopramide (REGLAN) 10 MG Tab Take 1 Tablet by mouth 4 times a day as needed. (Patient not taking: Reported on 6/14/2022) 30 Tablet 3   • omeprazole (PRILOSEC) 40 MG delayed-release capsule Take 1 Capsule by mouth every day. (Patient not taking: Reported on 6/14/2022) 30 Capsule 3   • hydrocodone-acetaminophen (VICODIN) 5-500 MG TABS Take 1-2 Tabs by mouth every four hours as needed for 20 doses. (Patient not taking: Reported on 6/14/2022) 20  Each 0       Current list of administered Medications:    Current Facility-Administered Medications:   •  apixaban (ELIQUIS) tablet 5 mg, 5 mg, Oral, BID, Herman Henriquez M.D.  •  melatonin tablet 5 mg, 5 mg, Oral, HS PRN, Zaid Rossi M.D., 5 mg at 06/16/22 2156  •  lisinopril (PRINIVIL) tablet 2.5 mg, 2.5 mg, Oral, Q DAY, Duncan Garcia M.D., 2.5 mg at 06/17/22 0535  •  ampicillin/sulbactam (UNASYN) 3 g in  mL IVPB, 3 g, Intravenous, Q6HRS, Fabiola Sommer M.D., Stopped at 06/17/22 0555  •  insulin GLARGINE (Lantus,Semglee) injection, 27 Units, Subcutaneous, Q EVENING, 27 Units at 06/16/22 1732 **AND** insulin lispro (AdmeLOG,HumaLOG) injection, 2-9 Units, Subcutaneous, Q6HRS, 3 Units at 06/17/22 0034 **AND** POC blood glucose manual result, , , Q6H **AND** NOTIFY MD and PharmD, , , Once **AND** Administer 20 grams of glucose (approximately 8 ounces of fruit juice) every 15 minutes PRN FSBG less than 70 mg/dL, , , PRN **AND** dextrose 10 % BOLUS 25 g, 25 g, Intravenous, Q15 MIN PRN, Fabiola Sommer M.D.  •  metoprolol tartrate (LOPRESSOR) tablet 12.5 mg, 12.5 mg, Oral, TWICE DAILY, Fabiola Sommer M.D., 12.5 mg at 06/16/22 1729  •  doxycycline monohydrate (ADOXA) tablet 100 mg, 100 mg, Oral, Q12HRS, Fabiola Sommer M.D., 100 mg at 06/17/22 0528  •  insulin lispro (AdmeLOG,HumaLOG) injection, 5 Units, Subcutaneous, TID AC, Fabiola Sommer M.D., 5 Units at 06/16/22 1652  •  LORazepam (ATIVAN) injection 0.5 mg, 0.5 mg, Intravenous, Q3HRS PRN, Herman Fields D.O., 0.5 mg at 06/16/22 1454  •  hydrALAZINE (APRESOLINE) injection 5 mg, 5 mg, Intravenous, Q4HRS PRN, Jacqueline Nicole M.D., 5 mg at 06/13/22 1405  •  atorvastatin (LIPITOR) tablet 40 mg, 40 mg, Enteral Tube, Q EVENING, Jacqueline Nicole M.D., 40 mg at 06/16/22 1729  •  senna-docusate (PERICOLACE or SENOKOT S) 8.6-50 MG per tablet 2 Tablet, 2 Tablet, Enteral Tube, BID, 2 Tablet at 06/14/22 7931 **AND** polyethylene glycol/lytes (MIRALAX) PACKET 1 Packet, 1  Packet, Enteral Tube, QDAY PRN **AND** magnesium hydroxide (MILK OF MAGNESIA) suspension 30 mL, 30 mL, Enteral Tube, QDAY PRN **AND** bisacodyl (DULCOLAX) suppository 10 mg, 10 mg, Rectal, QDAY PRN, Jacqueline Nicole M.D.  •  acetaminophen (Tylenol) tablet 650 mg, 650 mg, Enteral Tube, Q6HRS PRN, Jacqueline Nicole M.D., 650 mg at 06/16/22 0024  •  ondansetron (ZOFRAN) syringe/vial injection 4 mg, 4 mg, Intravenous, Q4HRS PRN, Jacqueline Nicole M.D., 4 mg at 06/16/22 2010  •  ondansetron (ZOFRAN ODT) dispertab 4 mg, 4 mg, Oral, Q4HRS PRN, Jacqueline Nicole M.D., 4 mg at 06/15/22 2310  •  pantoprazole (Protonix) injection 40 mg, 40 mg, Intravenous, BID, Jacqueline Nicole M.D., 40 mg at 06/17/22 0527    Past Medical History:   Diagnosis Date   • Diabetes    • Hypertension    • Seizure disorder (HCC)        Past Surgical History:   Procedure Laterality Date   • LA UPPER GI ENDOSCOPY,DIAGNOSIS N/A 6/15/2022    Procedure: GASTROSCOPY AND COLONOSCOPY;  Surgeon: Ty He D.O.;  Location: [facility];  Service: Gastroenterology   • LA COLONOSCOPY,DIAGNOSTIC N/A 6/15/2022    Procedure: COLONOSCOPY;  Surgeon: Ty He D.O.;  Location: SURGERY [facility];  Service: Gastroenterology       No family history on file.  Patient family history was personally reviewed, no pertinent family history to current presentation    Social History     Socioeconomic History   • Marital status:      Spouse name: Not on file   • Number of children: Not on file   • Years of education: Not on file   • Highest education level: Not on file   Occupational History   • Not on file   Tobacco Use   • Smoking status: Current Every Day Smoker   • Smokeless tobacco: Not on file   Substance and Sexual Activity   • Alcohol use: Yes   • Drug use: No   • Sexual activity: Not on file   Other Topics Concern   • Not on file   Social History Narrative   • Not on file     Social Determinants of Health     Financial Resource Strain: Not on file   Food Insecurity: Not on  "file   Transportation Needs: Not on file   Physical Activity: Not on file   Stress: Not on file   Social Connections: Not on file   Intimate Partner Violence: Not on file   Housing Stability: Not on file       ALLERGIES:  Allergies   Allergen Reactions   • Other Drug      Pt states she is allergic to \"ALL\" -javad like lidocaine       Review of systems:  A complete review of symptoms was reviewed with patient. This is reviewed in H&P and PMH. ALL OTHERS reviewed and negative    Physical exam:  Vitals:    06/17/22 0053 06/17/22 0435 06/17/22 0535 06/17/22 0700   BP: 137/50 (!) 154/68 (!) 172/59 (!) 151/81   Pulse: 60 (!) 58  61   Resp: 16 18 18   Temp: 36.4 °C (97.6 °F) 36.6 °C (97.8 °F)  37.3 °C (99.2 °F)   TempSrc: Oral Oral  Oral   SpO2: 95% 97%  98%   Weight: 109 kg (240 lb 4.8 oz)      Height:           General: No acute distress.   EYES: no jaundice  HEENT: OP clear   Neck: No bruits No JVD.   CVS:  RRR. S1 + S2. No M/R/G. No edema.  Resp: CTAB. No wheezing or crackles/rhonchi.  Abdomen: Soft, NT, ND,  Skin: Grossly nothing acute no obvious rashes  Neurological: Alert, Moves all extremities, no cranial nerve defects on limited exam  Extremities: Pulse 2+ in b/l LE. No cyanosis.     Data:  Laboratory studies personally reviewed by me:  Recent Results (from the past 24 hour(s))   POCT glucose device results    Collection Time: 06/16/22 11:34 AM   Result Value Ref Range    POC Glucose, Blood 209 (H) 65 - 99 mg/dL   TROPONIN    Collection Time: 06/16/22 12:45 PM   Result Value Ref Range    Troponin T 311 (H) 6 - 19 ng/L   POCT glucose device results    Collection Time: 06/16/22  4:45 PM   Result Value Ref Range    POC Glucose, Blood 340 (H) 65 - 99 mg/dL   POCT glucose device results    Collection Time: 06/17/22 12:33 AM   Result Value Ref Range    POC Glucose, Blood 211 (H) 65 - 99 mg/dL   POCT glucose device results    Collection Time: 06/17/22  5:39 AM   Result Value Ref Range    POC Glucose, Blood 143 (H) 65 - " 99 mg/dL       EKG 6/11/22 interpreted by me sinus, nonspecific st wave changes, artifact    All pertinent features of laboratory and imaging reviewed including primary images where applicable    TTE 6/11/22  CONCLUSIONS  Normal left ventricular systolic function.  Moderate concentric left ventricular hypertrophy.  Left Ventricle  Normal left ventricular chamber size. Moderate concentric left   ventricular hypertrophy. Normal left ventricular systolic function. The   left ventricular ejection fraction is visually estimated to be 70%.   Normal regional wall motion. Indeterminate diastolic function.    Principal Problem:    DKA, type 1, not at goal ([facility]) POA: Yes  Active Problems:    DM (diabetes mellitus) ([facility]) POA: Unknown    Acute renal failure (ARF) ([facility]) POA: Unknown    Hypertension POA: Unknown    Chest pain POA: Unknown    Sepsis ([facility]) POA: Unknown    Hematemesis POA: Unknown    Aspiration into lower respiratory tract POA: Unknown    Hypokalemia POA: Unknown    NSTEMI (non-ST elevated myocardial infarction) ([facility]) POA: Unknown    Pericardial effusion POA: Unknown    Abnormal CT of the abdomen POA: Unknown    Uterine mass POA: Unknown    New onset atrial fibrillation ([facility]) POA: Unknown    DKA (diabetic ketoacidosis) ([facility]) POA: Unknown    Bilateral pneumonia POA: Unknown    Acute respiratory failure with hypoxemia ([facility]) POA: Unknown  Resolved Problems:    * No resolved hospital problems. *      Assessment / Plan:  67 year old woman with diabetes, obesity, hypertension presents with new onset atrial fibrillation with RVR and hypertensive emergency. Also found DKA. Consult for nstemi, deemed likely demand type 2. Stay c/b GIB.    -blood pressure control with ACE or ARB goal 120/80 at least, titrate as needed  -if no contraindications, continue doac for cva prevention and additional p2y12 such as plavix would also be optimal from nstemi perspective  - - please scan in strips/evidence of atrial fibrillation  -continue  rate control with metoprolol  -daily ekg  -mpi spect nuclear pharm stress test when other medical conditions stabilized    I personally discussed her case with Dr Henriquez    It is my pleasure to participate in the care of Ms. Willis.  Please do not hesitate to contact me with questions or concerns.    Duncan Garcia MD  Cardiologist [facility]

## 2022-06-17 NOTE — PROGRESS NOTES
Bedside report received from Shona AGUILERA and assumed Pt's cares. Call light within reach. Safety measures in place.

## 2022-06-18 ENCOUNTER — APPOINTMENT (OUTPATIENT)
Dept: RADIOLOGY | Facility: MEDICAL CENTER | Age: 67
DRG: 637 | End: 2022-06-18
Attending: HOSPITALIST
Payer: MEDICARE

## 2022-06-18 VITALS
SYSTOLIC BLOOD PRESSURE: 171 MMHG | TEMPERATURE: 98.2 F | WEIGHT: 240.3 LBS | HEART RATE: 63 BPM | RESPIRATION RATE: 18 BRPM | OXYGEN SATURATION: 96 % | BODY MASS INDEX: 38.62 KG/M2 | HEIGHT: 66 IN | DIASTOLIC BLOOD PRESSURE: 70 MMHG

## 2022-06-18 LAB
EKG IMPRESSION: NORMAL
GLUCOSE BLD STRIP.AUTO-MCNC: 168 MG/DL (ref 65–99)
GLUCOSE BLD STRIP.AUTO-MCNC: 259 MG/DL (ref 65–99)
TROPONIN T SERPL-MCNC: 290 NG/L (ref 6–19)

## 2022-06-18 PROCEDURE — 93010 ELECTROCARDIOGRAM REPORT: CPT | Performed by: INTERNAL MEDICINE

## 2022-06-18 PROCEDURE — C9113 INJ PANTOPRAZOLE SODIUM, VIA: HCPCS | Performed by: INTERNAL MEDICINE

## 2022-06-18 PROCEDURE — 700102 HCHG RX REV CODE 250 W/ 637 OVERRIDE(OP): Performed by: STUDENT IN AN ORGANIZED HEALTH CARE EDUCATION/TRAINING PROGRAM

## 2022-06-18 PROCEDURE — 82962 GLUCOSE BLOOD TEST: CPT

## 2022-06-18 PROCEDURE — 93005 ELECTROCARDIOGRAM TRACING: CPT | Performed by: HOSPITALIST

## 2022-06-18 PROCEDURE — 700111 HCHG RX REV CODE 636 W/ 250 OVERRIDE (IP): Performed by: STUDENT IN AN ORGANIZED HEALTH CARE EDUCATION/TRAINING PROGRAM

## 2022-06-18 PROCEDURE — 84484 ASSAY OF TROPONIN QUANT: CPT

## 2022-06-18 PROCEDURE — 99232 SBSQ HOSP IP/OBS MODERATE 35: CPT | Performed by: INTERNAL MEDICINE

## 2022-06-18 PROCEDURE — 700111 HCHG RX REV CODE 636 W/ 250 OVERRIDE (IP): Performed by: INTERNAL MEDICINE

## 2022-06-18 PROCEDURE — A9270 NON-COVERED ITEM OR SERVICE: HCPCS | Performed by: STUDENT IN AN ORGANIZED HEALTH CARE EDUCATION/TRAINING PROGRAM

## 2022-06-18 PROCEDURE — 700102 HCHG RX REV CODE 250 W/ 637 OVERRIDE(OP): Performed by: FAMILY MEDICINE

## 2022-06-18 PROCEDURE — 71045 X-RAY EXAM CHEST 1 VIEW: CPT

## 2022-06-18 PROCEDURE — A9270 NON-COVERED ITEM OR SERVICE: HCPCS | Performed by: FAMILY MEDICINE

## 2022-06-18 PROCEDURE — 99239 HOSP IP/OBS DSCHRG MGMT >30: CPT | Performed by: STUDENT IN AN ORGANIZED HEALTH CARE EDUCATION/TRAINING PROGRAM

## 2022-06-18 RX ORDER — LISINOPRIL 5 MG/1
5 TABLET ORAL DAILY
Qty: 30 TABLET | Refills: 0 | Status: SHIPPED | OUTPATIENT
Start: 2022-06-19 | End: 2022-10-30

## 2022-06-18 RX ORDER — PANTOPRAZOLE SODIUM 40 MG/1
40 TABLET, DELAYED RELEASE ORAL 2 TIMES DAILY
Qty: 60 TABLET | Refills: 0 | Status: SHIPPED | OUTPATIENT
Start: 2022-06-18 | End: 2022-06-18 | Stop reason: SDUPTHER

## 2022-06-18 RX ORDER — PANTOPRAZOLE SODIUM 40 MG/1
40 TABLET, DELAYED RELEASE ORAL 2 TIMES DAILY
Qty: 60 TABLET | Refills: 0 | Status: SHIPPED | OUTPATIENT
Start: 2022-06-18 | End: 2022-07-18

## 2022-06-18 RX ORDER — LISINOPRIL 5 MG/1
5 TABLET ORAL DAILY
Qty: 30 TABLET | Refills: 0 | Status: SHIPPED | OUTPATIENT
Start: 2022-06-19 | End: 2022-06-18 | Stop reason: SDUPTHER

## 2022-06-18 RX ADMIN — INSULIN LISPRO 5 UNITS: 100 INJECTION, SOLUTION INTRAVENOUS; SUBCUTANEOUS at 12:47

## 2022-06-18 RX ADMIN — LISINOPRIL 5 MG: 5 TABLET ORAL at 05:31

## 2022-06-18 RX ADMIN — INSULIN LISPRO 2 UNITS: 100 INJECTION, SOLUTION INTRAVENOUS; SUBCUTANEOUS at 06:10

## 2022-06-18 RX ADMIN — METOPROLOL TARTRATE 12.5 MG: 25 TABLET, FILM COATED ORAL at 05:31

## 2022-06-18 RX ADMIN — LORAZEPAM 0.5 MG: 2 INJECTION INTRAMUSCULAR; INTRAVENOUS at 04:32

## 2022-06-18 RX ADMIN — PROCHLORPERAZINE EDISYLATE 10 MG: 5 INJECTION INTRAMUSCULAR; INTRAVENOUS at 09:35

## 2022-06-18 RX ADMIN — APIXABAN 5 MG: 5 TABLET, FILM COATED ORAL at 05:31

## 2022-06-18 RX ADMIN — INSULIN LISPRO 5 UNITS: 100 INJECTION, SOLUTION INTRAVENOUS; SUBCUTANEOUS at 06:14

## 2022-06-18 RX ADMIN — INSULIN LISPRO 5 UNITS: 100 INJECTION, SOLUTION INTRAVENOUS; SUBCUTANEOUS at 12:46

## 2022-06-18 RX ADMIN — PANTOPRAZOLE SODIUM 40 MG: 40 INJECTION, POWDER, FOR SOLUTION INTRAVENOUS at 05:31

## 2022-06-18 ASSESSMENT — ENCOUNTER SYMPTOMS
CONSTIPATION: 0
PALPITATIONS: 0
BACK PAIN: 0
SHORTNESS OF BREATH: 1
EYE DISCHARGE: 0
MYALGIAS: 0
ABDOMINAL PAIN: 0
DIARRHEA: 0
WHEEZING: 0
SPUTUM PRODUCTION: 0
ORTHOPNEA: 0
WEIGHT LOSS: 0
FOCAL WEAKNESS: 0
VOMITING: 0
NECK PAIN: 0
CLAUDICATION: 0
SEIZURES: 0
COUGH: 1
EYE PAIN: 0

## 2022-06-18 NOTE — PROGRESS NOTES
OVERNIGHT HOSPITALIST:    Paged by nursing Staff, patient was having positional chest pain only, now 2/10 in intensity.  Stat EKG was done at 3:34 AM.  She has no acute ischemia.  Stress test was done yesterday and negative for acute ischemia.  She had trending up troponin level, 313 on 6/16/2022.    Added serial cardiac enzymes and portable chest x-ray.

## 2022-06-18 NOTE — PROGRESS NOTES
Cardiology Progress Note    Date of service: 6/18/2022    Reason for visit/Chief complaint: ?new onset AF/Type II NSTEMI    HPI:   Pt is a 66 yo F. History of T2DM, HTN, who presented with DKA and ?new onset AF. Transferred here with hemetemesis. Found to have elevated trop, underwent NM stress not showing severe ischemia. On EGD to have gastric/small bowel inflammation. No complaints this morning.    Past Medical History:   Diagnosis Date   • Diabetes    • Hypertension    • Seizure disorder (HCC)      Past Surgical History:   Procedure Laterality Date   • [place] UPPER GI ENDOSCOPY,DIAGNOSIS N/A 6/15/2022    Procedure: GASTROSCOPY AND COLONOSCOPY;  Surgeon: Ty He D.O.;  Location: SURGERY [facility];  Service: Gastroenterology   • OK COLONOSCOPY,DIAGNOSTIC N/A 6/15/2022    Procedure: COLONOSCOPY;  Surgeon: Ty He D.O.;  Location: SURGERY [facility];  Service: Gastroenterology     ROS: Negative for orthopnea, PND, palpitations, chest pain    Physical Exam:  Vitals:    06/18/22 0327 06/18/22 0531 06/18/22 0712 06/18/22 0816   BP: 131/57 (!) 172/52 (!) 130/93    Pulse: 60 67 62    Resp: 16  16    Temp: 36.6 °C (97.8 °F)  36.8 °C (98.2 °F)    TempSrc: Oral  Oral    SpO2: 98%  100% 95%   Weight:       Height:         Gen: Obese, NAD, conversantw  HEENT: PERRL, EOMI  LUNGS: CTA B, no w/r/r  CV: RRR, no m/r/g, no JVD  Abd: Soft, NT/ND, +BS  Ext: no edema, warm and well perfused    Labs reviewed    EKG interpreted by me:  NSR    Telemetry reviewed, NSR    Impression/Recs:  1. Hypotension, unspecified hypotension type  Central Line Insertion    Central Line Insertion   2. Weakness  Referral to Skilled Nursing Facility   3. DKA, type 1, not at goal (HCC)  Referral to Skilled Nursing Facility   4. Acute renal failure, unspecified acute renal failure type (HCC)  Referral to Skilled Nursing Facility     -I have reviewed all available tracings including all telemetry here, 12 lead EKGs here and available strips/EKG  from [place]  -I do not see evidence of AF  -One 12 lead showing sinus tachy could possible be 2:1 AFL but on closer inspection inferior leads not consistent with flutter waves, and lead II is fairly consistent with sinus tachy  -Given GI bleeding history and without evidence of AF I would recommend staying off OAC for now  -I think long term monitoring for AF including 30 day ambulatory monitoring reasonable and she can be referred to cardiology for this at discharge    Giuseppe Lin MD

## 2022-06-18 NOTE — DISCHARGE SUMMARY
Discharge Summary    CHIEF COMPLAINT ON ADMISSION  No chief complaint on file.      Reason for Admission  Diabetic Ketoacidosis     Admission Date  6/11/2022    CODE STATUS  DNAR/DNI    HPI & HOSPITAL COURSE  This is a 67 y.o. female here with generalized pain and chills. Patient with history of insulin dependent diabetes, obesity, hypertension, who presented to outside hospital [facility] for above complaints. Patient found to have new onset atrial fibrillation per report, as well as diabetic ketoacidosis, acute kidney injury, metabolic acidosis. Patient transferred for higher level of care. Patient confused on presentation which resolved with treatment. She was admitted to the ICU for DKA and treated with insulin drip. Patient found to have bilateral pneumonia and treated with antibiotics, completed course. Patient with hematemsis, she underwent EGD showing gastritis, esophagitis, and duodenitis. Patient started on BID PPI therapy, hemoglobin stable with no further hematemesis episodes. Cardiology consulted for elevated troponin and new onset a fib. Patient remained in sinus rhythm throughout hospital stay. She was started on eliquis but this was discontinued as a fib is not confirmed. Patient underwent cardiac stress testing which did not show any evidence of reversible ischemia. Patient is to follow up with cardiology for outpatient cardiac monitoring. Patient feeling well, physical and occupational therapy recommend post acute services but patient and family feel they can care for patient well at home. She is ambulating with assistance and has functional mobility has improved since last PT/OT evaluation. Patient on room air, she is discharged to home with close PCP follow up and home health.    Therefore, she is discharged in fair and stable condition to home with close outpatient follow-up.    The patient met 2-midnight criteria for an inpatient stay at the time of discharge.    Discharge  Date  6/18/2022    FOLLOW UP ITEMS POST DISCHARGE  Take medications as prescribed.  Follow up with PCP, GI, cardiology.    DISCHARGE DIAGNOSES  Principal Problem:    DKA, type 1, not at goal ([facility]) POA: Yes  Active Problems:    DM (diabetes mellitus) ([facility]) POA: Unknown    Acute renal failure (ARF) ([facility]) POA: Unknown    Hypertension POA: Unknown    Chest pain POA: Unknown    Sepsis ([facility]) POA: Unknown    Hematemesis POA: Unknown    Aspiration into lower respiratory tract POA: Unknown    Hypokalemia POA: Unknown    NSTEMI (non-ST elevated myocardial infarction) ([facility]) POA: Unknown    Pericardial effusion POA: Unknown    Abnormal CT of the abdomen POA: Unknown    Uterine mass POA: Unknown    New onset atrial fibrillation ([facility]) POA: Unknown    DKA (diabetic ketoacidosis) ([facility]) POA: Unknown    Bilateral pneumonia POA: Unknown    Acute respiratory failure with hypoxemia ([facility]) POA: Unknown  Resolved Problems:    * No resolved hospital problems. *      FOLLOW UP  No future appointments.  No follow-up provider specified.    MEDICATIONS ON DISCHARGE     Medication List      START taking these medications      Instructions   lisinopril 5 MG Tabs  Start taking on: June 19, 2022  Commonly known as: PRINIVIL   Take 1 Tablet by mouth every day.  Dose: 5 mg        CHANGE how you take these medications      Instructions   pantoprazole 40 MG Tbec  What changed: when to take this  Commonly known as: PROTONIX   Take 1 Tablet by mouth 2 times a day for 30 days.  Dose: 40 mg        CONTINUE taking these medications      Instructions   amLODIPine 10 MG Tabs  Commonly known as: NORVASC   Take 10 mg by mouth every day.  Dose: 10 mg     atorvastatin 10 MG Tabs  Commonly known as: LIPITOR   Take 10 mg by mouth every evening.  Dose: 10 mg     busPIRone 10 MG Tabs tablet  Commonly known as: BUSPAR   Take 10 mg by mouth 2 times a day.  Dose: 10 mg     gabapentin 300 MG Caps  Commonly known as: NEURONTIN   Take 300 mg by mouth 2 times a day.  Dose:  "300 mg     mirtazapine 15 MG Tabs  Commonly known as: Remeron   Take 15 mg by mouth every evening.  Dose: 15 mg     ondansetron 4 MG Tbdp  Commonly known as: ZOFRAN ODT   Take 4 mg by mouth as needed for Nausea.  Dose: 4 mg     prazosin 1 MG Caps  Commonly known as: MINIPRESS   Take 1 mg by mouth 2 times a day.  Dose: 1 mg     prochlorperazine 25 MG Supp  Commonly known as: COMPAZINE   Insert 25 mg into the rectum as needed for Nausea/Vomiting.  Dose: 25 mg     sertraline 50 MG Tabs  Commonly known as: Zoloft   Take 200 mg by mouth every day. Pt's MD adjusted dose to 50 mg x 4 tabs once a day per pt's daughter.  Dose: 200 mg     sucralfate 1 GM/10ML Susp  Commonly known as: CARAFATE   Take 10 mg by mouth as needed. Pt's daughter stated it was a 10 mL suspension with a concentration of 1 mg per 1 mL.  Dose: 10 mg     tizanidine 4 MG Tabs  Commonly known as: ZANAFLEX   Take 4 mg by mouth as needed.  Dose: 4 mg        STOP taking these medications    hydrocodone-acetaminophen 5-500 MG Tabs  Commonly known as: Vicodin     hydrOXYzine HCl 25 MG Tabs  Commonly known as: ATARAX     metoclopramide 10 MG Tabs  Commonly known as: REGLAN     omeprazole 40 MG delayed-release capsule  Commonly known as: PRILOSEC            Allergies  Allergies   Allergen Reactions   • Other Drug      Pt states she is allergic to \"ALL\" -javad like lidocaine       DIET  Orders Placed This Encounter   Procedures   • Diet Order Diet: Level 4 - Pureed; Liquid level: Level 0 - Thin     Standing Status:   Standing     Number of Occurrences:   1     Order Specific Question:   Diet:     Answer:   Level 4 - Pureed [25]     Order Specific Question:   Liquid level     Answer:   Level 0 - Thin       ACTIVITY  As tolerated.  Weight bearing as tolerated    CONSULTATIONS  Cardiology  gastroenterology    PROCEDURES  6/11 central line insertion  6/15:gastroscopy and colonoscopy      LABORATORY  Lab Results   Component Value Date    SODIUM 138 06/17/2022    " POTASSIUM 3.7 06/17/2022    CHLORIDE 104 06/17/2022    CO2 25 06/17/2022    GLUCOSE 287 (H) 06/17/2022    BUN 8 06/17/2022    CREATININE 0.99 06/17/2022        Lab Results   Component Value Date    WBC 9.3 06/17/2022    HEMOGLOBIN 10.1 (L) 06/17/2022    HEMATOCRIT 32.1 (L) 06/17/2022    PLATELETCT 252 06/17/2022        Total time of the discharge process exceeds 38 minutes.

## 2022-06-18 NOTE — FACE TO FACE
Face to Face Supporting Documentation - Home Health    The encounter with this patient was in whole or in part the primary reason for home health admission.    Date of encounter:   Patient:                    MRN:                       YOB: 2021  Asmita Willis  [id number]  1955     Home health to see patient for:  Skilled Nursing care for assessment, interventions & education, Physical Therapy evaluation and treatment and Occupational therapy evaluation and treatment    Skilled need for:  New Onset Medical Diagnosis diabetic ketoacidosis    Skilled nursing interventions to include:  Comment: physical and occupational therapy    Homebound status evidenced by:  Needs the assistance of another person in order to leave the home. Leaving home requires a considerable and taxing effort. There is a normal inability to leave the home.    Community Physician to provide follow up care: Pcp Pt States None     Optional Interventions? No      I certify the face to face encounter for this home health care referral meets the CMS requirements and the encounter/clinical assessment with the patient was, in whole, or in part, for the medical condition(s) listed above, which is the primary reason for home health care. Based on my clinical findings: the service(s) are medically necessary, support the need for home health care, and the homebound criteria are met.  I certify that this patient has had a face to face encounter by myself.  Herman Henriquez M.D. - NPI: [id number]

## 2022-06-18 NOTE — DISCHARGE PLANNING
Received Choice form at 4727  Agency/Facility Name: [facility]   Referral sent per Choice form @ 4710

## 2022-06-18 NOTE — PROGRESS NOTES
"Patient called this RN complaining of 6/10 chest pain. Patient described the chest pain as \"someone standing on her chest\" .Patient stated that the pain is worse when she lays flight and is relieved when she is sitting up and on her side This Rn notified charge RN. Vitals taken, EKG obtained and Dr. Mohan notified.  Patient stated chest pain level was 2/10 after EKG was done.  New orders received for STAT trop lab draw and stat chest x-ray.      "

## 2022-06-18 NOTE — PROGRESS NOTES
Telemetry Shift Summary     Rhythm: SB-SR  HR: 57-67  Ectopy: rPVC, rPAC    Measurements: 0.20/0.08/0.43    Normal Values  Rhythm: SR  HR:   Measurements: 0.12-0.20/0.08-0.10/0.30-0.52

## 2022-06-18 NOTE — PROGRESS NOTES
Patient discharged home with daughter and . Home health referral sent prior to leaving. Paperwork explained to daughter and IJ removed per protocol. Patient did not bleed or have any side effects. Wheeled off unit by JONY Hebert.

## 2022-06-18 NOTE — PROGRESS NOTES
Hospital Medicine Daily Progress Note    Date of Service  6/18/2022    Chief Complaint  Yaima Willis is a 67 y.o. female admitted 6/11/2022 with DKA    Hospital Course  This is a 66yo female with a histoy of chronic low back pain, diabetes, morbid obesity and HTN who presented to [facility] with generalized pain, chest pain, diarrhea and chills.  She was found to have new onset afib there along with sepsis and TAO. While awaiting transfer to [facility], she also had an episode of hematemesis.  She was admitted to our ICU and DKA resolved.     Interval Problem Update  Weaned to room air.  Cardiology recommend stopping anticoagulation as no evidence of a fib on telemetry and EKG's. Stop eliquis. Follow up with cardiology as outpatient for cardiac monitoring.  PT/OT recommend SNF.  Patient and family would like to go home with home health. Bedside nursing report patient continues to have functional improvement.  Home health ordered.  Patient is medically cleared to discharge home with home health.      I have personally seen and examined the patient at bedside. I discussed the plan of care with patient, family, bedside RN and cardiology.    Consultants/Specialty  cardiology    Code Status  DNAR/DNI    Disposition  Patient is medically cleared for discharge.   Anticipate discharge to home with home health.  I have placed the appropriate orders for post-discharge needs.    Review of Systems  Review of Systems   Constitutional: Positive for malaise/fatigue. Negative for weight loss.   HENT: Negative for congestion, hearing loss and nosebleeds.    Eyes: Negative for pain and discharge.   Respiratory: Positive for cough and shortness of breath. Negative for sputum production and wheezing.    Cardiovascular: Negative for palpitations, orthopnea and claudication.   Gastrointestinal: Negative for abdominal pain, constipation, diarrhea and vomiting.   Genitourinary: Negative for frequency and hematuria.   Musculoskeletal: Negative  for back pain, myalgias and neck pain.   Skin: Negative for itching.   Neurological: Negative for focal weakness and seizures.   All other systems reviewed and are negative.       Physical Exam  Temp:  [36.6 °C (97.8 °F)-36.8 °C (98.2 °F)] 36.8 °C (98.2 °F)  Pulse:  [60-68] 62  Resp:  [16-17] 16  BP: (113-172)/(52-93) 130/93  SpO2:  [95 %-100 %] 95 %    Physical Exam  Vitals and nursing note reviewed.   Constitutional:       General: She is not in acute distress.     Appearance: Normal appearance. She is obese.   HENT:      Head: Normocephalic and atraumatic.      Right Ear: Tympanic membrane and ear canal normal.      Left Ear: Tympanic membrane and ear canal normal.      Nose: No congestion or rhinorrhea.      Mouth/Throat:      Mouth: Mucous membranes are moist.   Eyes:      Extraocular Movements: Extraocular movements intact.      Conjunctiva/sclera: Conjunctivae normal.      Pupils: Pupils are equal, round, and reactive to light.   Cardiovascular:      Rate and Rhythm: Normal rate and regular rhythm.      Pulses: Normal pulses.      Heart sounds: Normal heart sounds. No murmur heard.    No friction rub. No gallop.   Pulmonary:      Effort: Pulmonary effort is normal. No respiratory distress.      Breath sounds: No stridor. Rales present. No wheezing or rhonchi.      Comments: Very mild increased work of breathing    Abdominal:      General: Abdomen is flat. Bowel sounds are normal. There is no distension.      Palpations: Abdomen is soft. There is no mass.   Musculoskeletal:         General: No swelling, tenderness or deformity.      Cervical back: Normal range of motion and neck supple.   Skin:     General: Skin is warm and dry.      Coloration: Skin is not jaundiced.   Neurological:      General: No focal deficit present.      Mental Status: She is alert.         Fluids    Intake/Output Summary (Last 24 hours) at 6/18/2022 1219  Last data filed at 6/18/2022 1000  Gross per 24 hour   Intake 1150 ml   Output  4050 ml   Net -2900 ml       Laboratory  Recent Labs     06/16/22  0530 06/17/22  1130   WBC 9.3 9.3   RBC 3.60* 3.77*   HEMOGLOBIN 9.7* 10.1*   HEMATOCRIT 30.0* 32.1*   MCV 83.3 85.1   MCH 26.9* 26.8*   MCHC 32.3* 31.5*   RDW 47.1 49.3   PLATELETCT 214 252   MPV 11.2 10.9     Recent Labs     06/16/22  0530 06/17/22  1130   SODIUM 136 138   POTASSIUM 3.4* 3.7   CHLORIDE 102 104   CO2 26 25   GLUCOSE 169* 287*   BUN 10 8   CREATININE 0.90 0.99   CALCIUM 8.0* 8.3*                   Imaging  DX-CHEST-PORTABLE (1 VIEW)   Final Result         1. Interval removal of gastric drainage tube. No other significant interval change.      NM-CARDIAC STRESS TEST   Final Result      DX-CHEST-PORTABLE (1 VIEW)   Final Result      1.  No acute cardiopulmonary abnormality identified.      2.  Bilateral atelectasis, unchanged      3.  Right midlung zone granuloma      4.  Enlarged cardiac silhouette      5.  Right internal jugular catheter and enteric catheter appear appropriately located      DX-ABDOMEN FOR TUBE PLACEMENT   Final Result      Nasogastric tube terminates within the gastric body.      CT-ABDOMEN-PELVIS W/O   Final Result      Moderate lower lobe consolidation on the right, peribronchial opacity on the left is worrisome for aspiration pneumonitis favored over pneumonia      Moderate pericardial effusion      Remote granulomatous disease      Subtle paraduodenal, peripancreatic fat stranding could indicate pancreatitis, duodenitis or may just be chronic, age related. Recommend correlation with pancreatic enzymes      Large intrauterine mass most likely is a leiomyoma but given the large size recommended gynecologic surgical referral as there is risk of sarcomatous degeneration when large      Benign right adrenal adenoma does not require follow-up      DX-CHEST-PORTABLE (1 VIEW)   Final Result      1.  Pulmonary vascular prominence with increased interstitial markings are suggestive of edema. There is likely a component of  vessel crowding secondary to portable AP technique and low lung volumes.      2.  Right internal jugular catheter is noted with the tip projecting over the superior vena cava. No pneumothorax is identified.      CT-HEAD W/O   Final Result      1.  No acute intracranial findings.      2.  Diffuse atrophy               EC-ECHOCARDIOGRAM COMPLETE W/O CONT   Final Result      DX-CHEST-PORTABLE (1 VIEW)   Final Result      Bibasilar pulmonary opacities, left greater than right, which could be related to atelectasis and/or pneumonitis. Small left pleural effusion.      OUTSIDE IMAGES-DX CHEST   Final Result           Assessment/Plan  Acute respiratory failure with hypoxemia (HCC)  Assessment & Plan  - Secondary to bilateral PNA  - Continue antibiotics with unasyn and doxy      Bilateral pneumonia  Assessment & Plan  - Change Rocephin to Unasyn/Doxy,  - Check MRSA nares : negative    DKA (diabetic ketoacidosis) (HCC)  Assessment & Plan  - A1c 12.3  - Unknown home regimen - nursing calling pharmacy to reconcile, pt states she takes 70NPH and 15u regular BID   -  this am with 21u Semglee last evening - increase to 28u  - Daughter is going to [facility] to meet with diabetes educator as pt does need further education given her DKA     New onset atrial fibrillation (HCC)  Assessment & Plan  New a fib seen at OSH, but not seen on EKG's and telemetry  Cardiology recommend stopping eliquis, eliquis stopped  Recommend cardiology outpatient follow up for cardiac monitoring.    Uterine mass  Assessment & Plan  - Likely leiomyoma, will refer to Gyn Onc as an outpatient, I did let patient and family know of the finding    Abnormal CT of the abdomen  Assessment & Plan  - Question of pancreatitis vs duodenitis vs normal age related variant  - Lipase normal  - ?? Duodenal ulcer as she also had hematemesis  - BID protonix  -EGD showed duodenitis, gastritis and esophagitis    Pericardial effusion  Assessment & Plan  - Seen on CT but not  on echo   - No evidence of tamponade clinically       NSTEMI (non-ST elevated myocardial infarction) (HCC)  Assessment & Plan  - No anticoagulation currently given hematemesis, will optimize medically  - Start beta blocker today - if Bps and renal function stable in the am, start Losartan  - Consult placed to Dr Ramírez   - Peak trop of 724, trending down - recheck today given continued chest pain, but also may be referred from UGI issues and hematemesis    - Echo without WMA   - Card :wants inpatient NPI work up    Hypokalemia  Assessment & Plan  - Replace IV today, check mag and phos    Aspiration into lower respiratory tract  Assessment & Plan  - Failed bedside swallow with nursing, SLP eval ok for purees    Hematemesis  Assessment & Plan  - FOB positive  - Duodenitis seen on CT, suspect possible duodenal ulcer  - GI: EGD showed esophagitis, gastritis and duodenitis, polyps and hemorrhoids  - PPI  - Hemoglobins are stable    Sepsis (HCC)  Assessment & Plan  This is Sepsis Present on admission  SIRS criteria identified on my evaluation include: Fever, with temperature greater than 101 deg F, Tachycardia, with heart rate greater than 90 BPM, Tachypnea, with respirations greater than 20 per minute and Leukocytosis, with WBC greater than 12,000  Source is Aspiration PNA  Sepsis protocol initiated  Fluid resuscitation ordered per protocol  Crystalloid Fluid Administration: Fluid resuscitation ordered per standard protocol - 30 mL/kg per current or ideal body weight  IV antibiotics as appropriate for source of sepsis  Reassessment: I have reassessed the patient's hemodynamic status    Sputum culture pending, blood cultures negative. Unasyn/Doxy as unclear currently if this is CAP or Aspiration PNA, and pt continues to spike fevers on just Rocephin/Azithro.      Chest pain  Assessment & Plan  Negative stress test 6/17    Hypertension  Assessment & Plan  On metoprolol and lisinopril    Acute renal failure (ARF)  (HCC)  Assessment & Plan  - Pre renal due to sepsis, now resolved           VTE prophylaxis: SCDs/TEDs    I have performed a physical exam and reviewed and updated ROS and Plan today (6/18/2022). In review of yesterday's note (6/17/2022), there are no changes except as documented above.

## 2022-06-18 NOTE — CARE PLAN
The patient is Stable - Low risk of patient condition declining or worsening    Shift Goals  Clinical Goals: Monitor vitals  Patient Goals: sleep  Family Goals: sleep    Progress made toward(s) clinical / shift goals:    Problem: Knowledge Deficit - Standard  Goal: Patient and family/care givers will demonstrate understanding of plan of care, disease process/condition, diagnostic tests and medications  Outcome: Progressing  Note: Reviewed POC with patient. Denies any needs at this time.      Problem: Fall Risk  Goal: Patient will remain free from falls  Outcome: Progressing       Patient is not progressing towards the following goals:

## 2022-06-18 NOTE — DISCHARGE PLANNING
Anticipated Discharge Disposition:   Home with HH    Action:   Assisting inpt SW with HH order. CM met with pt and daughter. [facility] in [place]. Daughter said to call her since pt does not answer their phone.     Choice was signed by pt and faxed to [facility].     Barriers to Discharge:   Pending HH but RN will ask MD if ok to dc while HH is pending      Plan:   Dc home

## 2022-06-21 NOTE — DISCHARGE PLANNING
note:  FREDERICK talked to Florence at [facility] and she confirmed that they are accepting pt but waiting on PCP order. They are scheduled to see pt next week.

## 2022-06-22 NOTE — DISCHARGE PLANNING
Agency[facility]Facility Name: [facility]  Spoke To: María Elena  Outcome: María Elena requested the discharge summary.  [facility] faxed via right fax, fax #[phone].  Referral has been accepted.

## 2022-10-30 ENCOUNTER — APPOINTMENT (OUTPATIENT)
Dept: RADIOLOGY | Facility: MEDICAL CENTER | Age: 67
DRG: 871 | End: 2022-10-30
Attending: EMERGENCY MEDICINE
Payer: MEDICARE

## 2022-10-30 ENCOUNTER — HOSPITAL ENCOUNTER (INPATIENT)
Facility: MEDICAL CENTER | Age: 67
LOS: 5 days | DRG: 871 | End: 2022-11-04
Attending: EMERGENCY MEDICINE | Admitting: STUDENT IN AN ORGANIZED HEALTH CARE EDUCATION/TRAINING PROGRAM
Payer: MEDICARE

## 2022-10-30 DIAGNOSIS — Z79.4 TYPE 2 DIABETES MELLITUS WITHOUT COMPLICATION, WITH LONG-TERM CURRENT USE OF INSULIN (HCC): ICD-10-CM

## 2022-10-30 DIAGNOSIS — E11.9 TYPE 2 DIABETES MELLITUS WITHOUT COMPLICATION, WITH LONG-TERM CURRENT USE OF INSULIN (HCC): ICD-10-CM

## 2022-10-30 DIAGNOSIS — R73.9 HYPERGLYCEMIA: ICD-10-CM

## 2022-10-30 DIAGNOSIS — N17.8 ACUTE RENAL FAILURE WITH OTHER SPECIFIED PATHOLOGICAL KIDNEY LESION SUPERIMPOSED ON CHRONIC KIDNEY DISEASE, UNSPECIFIED CKD STAGE (HCC): ICD-10-CM

## 2022-10-30 DIAGNOSIS — K20.90 ESOPHAGITIS: ICD-10-CM

## 2022-10-30 DIAGNOSIS — I48.91 ATRIAL FIBRILLATION WITH RAPID VENTRICULAR RESPONSE (HCC): ICD-10-CM

## 2022-10-30 DIAGNOSIS — A41.9 SEPSIS, DUE TO UNSPECIFIED ORGANISM, UNSPECIFIED WHETHER ACUTE ORGAN DYSFUNCTION PRESENT (HCC): ICD-10-CM
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
## 2022-10-30 DIAGNOSIS — I48.91 NEW ONSET ATRIAL FIBRILLATION (HCC): ICD-10-CM

## 2022-10-30 DIAGNOSIS — N18.9 ACUTE RENAL FAILURE WITH OTHER SPECIFIED PATHOLOGICAL KIDNEY LESION SUPERIMPOSED ON CHRONIC KIDNEY DISEASE, UNSPECIFIED CKD STAGE (HCC): ICD-10-CM

## 2022-10-30 DIAGNOSIS — R19.7 DIARRHEA OF PRESUMED INFECTIOUS ORIGIN: ICD-10-CM

## 2022-10-30 PROBLEM — M54.50 CHRONIC BILATERAL LOW BACK PAIN WITHOUT SCIATICA: Status: ACTIVE | Noted: 2022-10-30

## 2022-10-30 PROBLEM — B37.81 ESOPHAGEAL THRUSH (HCC): Status: ACTIVE | Noted: 2022-10-30

## 2022-10-30 PROBLEM — G89.29 CHRONIC BILATERAL LOW BACK PAIN WITHOUT SCIATICA: Status: ACTIVE | Noted: 2022-10-30

## 2022-10-30 PROBLEM — F41.9 ANXIETY AND DEPRESSION: Status: ACTIVE | Noted: 2022-10-30

## 2022-10-30 PROBLEM — F32.A ANXIETY AND DEPRESSION: Status: ACTIVE | Noted: 2022-10-30
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
## 2022-10-30 PROBLEM — E87.29 HIGH ANION GAP METABOLIC ACIDOSIS: Status: ACTIVE | Noted: 2022-10-30

## 2022-10-30 PROBLEM — F32.1 CURRENT MODERATE EPISODE OF MAJOR DEPRESSIVE DISORDER WITHOUT PRIOR EPISODE (HCC): Status: ACTIVE | Noted: 2022-10-30

## 2022-10-30 PROBLEM — R65.20 SEVERE SEPSIS (HCC): Status: ACTIVE | Noted: 2022-06-11

## 2022-10-30 LAB
ALBUMIN SERPL BCP-MCNC: 3.7 G/DL (ref 3.2–4.9)
ALBUMIN/GLOB SERPL: 1.2 G/DL
ALP SERPL-CCNC: 78 U/L (ref 30–99)
ALT SERPL-CCNC: 11 U/L (ref 2–50)
ANION GAP SERPL CALC-SCNC: 11 MMOL/L (ref 7–16)
ANION GAP SERPL CALC-SCNC: 18 MMOL/L (ref 7–16)
APPEARANCE UR: CLEAR
APTT PPP: 28.8 SEC (ref 24.7–36)
AST SERPL-CCNC: 13 U/L (ref 12–45)
B-OH-BUTYR SERPL-MCNC: 0.74 MMOL/L (ref 0.02–0.27)
BACTERIA #/AREA URNS HPF: NEGATIVE /HPF
BASOPHILS # BLD AUTO: 0.2 % (ref 0–1.8)
BASOPHILS # BLD: 0.05 K/UL (ref 0–0.12)
BILIRUB SERPL-MCNC: 0.9 MG/DL (ref 0.1–1.5)
BILIRUB UR QL STRIP.AUTO: NEGATIVE
BUN SERPL-MCNC: 49 MG/DL (ref 8–22)
BUN SERPL-MCNC: 59 MG/DL (ref 8–22)
CALCIUM SERPL-MCNC: 6.5 MG/DL (ref 8.5–10.5)
CALCIUM SERPL-MCNC: 8.4 MG/DL (ref 8.5–10.5)
CHLORIDE SERPL-SCNC: 100 MMOL/L (ref 96–112)
CHLORIDE SERPL-SCNC: 109 MMOL/L (ref 96–112)
CO2 SERPL-SCNC: 15 MMOL/L (ref 20–33)
CO2 SERPL-SCNC: 18 MMOL/L (ref 20–33)
COLOR UR: YELLOW
CREAT SERPL-MCNC: 1.65 MG/DL (ref 0.5–1.4)
CREAT SERPL-MCNC: 2.29 MG/DL (ref 0.5–1.4)
D DIMER PPP IA.FEU-MCNC: 7.45 UG/ML (FEU) (ref 0–0.5)
EKG IMPRESSION: NORMAL
EOSINOPHIL # BLD AUTO: 0 K/UL (ref 0–0.51)
EOSINOPHIL NFR BLD: 0 % (ref 0–6.9)
EPI CELLS #/AREA URNS HPF: ABNORMAL /HPF
ERYTHROCYTE [DISTWIDTH] IN BLOOD BY AUTOMATED COUNT: 58.1 FL (ref 35.9–50)
GFR SERPLBLD CREATININE-BSD FMLA CKD-EPI: 23 ML/MIN/1.73 M 2
GFR SERPLBLD CREATININE-BSD FMLA CKD-EPI: 34 ML/MIN/1.73 M 2
GLOBULIN SER CALC-MCNC: 3.1 G/DL (ref 1.9–3.5)
GLUCOSE BLD STRIP.AUTO-MCNC: 240 MG/DL (ref 65–99)
GLUCOSE BLD STRIP.AUTO-MCNC: 262 MG/DL (ref 65–99)
GLUCOSE BLD STRIP.AUTO-MCNC: 288 MG/DL (ref 65–99)
GLUCOSE SERPL-MCNC: 290 MG/DL (ref 65–99)
GLUCOSE SERPL-MCNC: 373 MG/DL (ref 65–99)
GLUCOSE UR STRIP.AUTO-MCNC: 100 MG/DL
HCT VFR BLD AUTO: 44 % (ref 37–47)
HGB BLD-MCNC: 13.7 G/DL (ref 12–16)
HYALINE CASTS #/AREA URNS LPF: ABNORMAL /LPF
IMM GRANULOCYTES # BLD AUTO: 0.21 K/UL (ref 0–0.11)
IMM GRANULOCYTES NFR BLD AUTO: 0.8 % (ref 0–0.9)
INR PPP: 1.16 (ref 0.87–1.13)
KETONES UR STRIP.AUTO-MCNC: NEGATIVE MG/DL
LACTATE SERPL-SCNC: 1.5 MMOL/L (ref 0.5–2)
LACTATE SERPL-SCNC: 2 MMOL/L (ref 0.5–2)
LACTATE SERPL-SCNC: 2.5 MMOL/L (ref 0.5–2)
LACTATE SERPL-SCNC: 2.9 MMOL/L (ref 0.5–2)
LEUKOCYTE ESTERASE UR QL STRIP.AUTO: NEGATIVE
LYMPHOCYTES # BLD AUTO: 1.5 K/UL (ref 1–4.8)
LYMPHOCYTES NFR BLD: 5.5 % (ref 22–41)
MCH RBC QN AUTO: 24.2 PG (ref 27–33)
MCHC RBC AUTO-ENTMCNC: 31.1 G/DL (ref 33.6–35)
MCV RBC AUTO: 77.6 FL (ref 81.4–97.8)
MICRO URNS: ABNORMAL
MONOCYTES # BLD AUTO: 1.41 K/UL (ref 0–0.85)
MONOCYTES NFR BLD AUTO: 5.2 % (ref 0–13.4)
NEUTROPHILS # BLD AUTO: 24.08 K/UL (ref 2–7.15)
NEUTROPHILS NFR BLD: 88.3 % (ref 44–72)
NITRITE UR QL STRIP.AUTO: NEGATIVE
NRBC # BLD AUTO: 0 K/UL
NRBC BLD-RTO: 0 /100 WBC
PH UR STRIP.AUTO: 5 [PH] (ref 5–8)
PLATELET # BLD AUTO: 413 K/UL (ref 164–446)
PMV BLD AUTO: 10.1 FL (ref 9–12.9)
POTASSIUM SERPL-SCNC: 3.7 MMOL/L (ref 3.6–5.5)
POTASSIUM SERPL-SCNC: 4 MMOL/L (ref 3.6–5.5)
PROCALCITONIN SERPL-MCNC: 0.19 NG/ML
PROT SERPL-MCNC: 6.8 G/DL (ref 6–8.2)
PROT UR QL STRIP: 30 MG/DL
PROTHROMBIN TIME: 14.7 SEC (ref 12–14.6)
RBC # BLD AUTO: 5.67 M/UL (ref 4.2–5.4)
RBC # URNS HPF: ABNORMAL /HPF
RBC UR QL AUTO: NEGATIVE
SCCMEC + MECA PNL NOSE NAA+PROBE: NEGATIVE
SODIUM SERPL-SCNC: 135 MMOL/L (ref 135–145)
SODIUM SERPL-SCNC: 136 MMOL/L (ref 135–145)
SP GR UR STRIP.AUTO: 1.03
UROBILINOGEN UR STRIP.AUTO-MCNC: 0.2 MG/DL
WBC # BLD AUTO: 27.3 K/UL (ref 4.8–10.8)
WBC #/AREA URNS HPF: ABNORMAL /HPF

## 2022-10-30 PROCEDURE — 700105 HCHG RX REV CODE 258: Performed by: STUDENT IN AN ORGANIZED HEALTH CARE EDUCATION/TRAINING PROGRAM

## 2022-10-30 PROCEDURE — 96366 THER/PROPH/DIAG IV INF ADDON: CPT

## 2022-10-30 PROCEDURE — 87086 URINE CULTURE/COLONY COUNT: CPT

## 2022-10-30 PROCEDURE — 99291 CRITICAL CARE FIRST HOUR: CPT | Performed by: INTERNAL MEDICINE

## 2022-10-30 PROCEDURE — A9270 NON-COVERED ITEM OR SERVICE: HCPCS | Performed by: STUDENT IN AN ORGANIZED HEALTH CARE EDUCATION/TRAINING PROGRAM

## 2022-10-30 PROCEDURE — 36415 COLL VENOUS BLD VENIPUNCTURE: CPT

## 2022-10-30 PROCEDURE — 80053 COMPREHEN METABOLIC PANEL: CPT

## 2022-10-30 PROCEDURE — 99223 1ST HOSP IP/OBS HIGH 75: CPT | Mod: AI | Performed by: STUDENT IN AN ORGANIZED HEALTH CARE EDUCATION/TRAINING PROGRAM

## 2022-10-30 PROCEDURE — 700102 HCHG RX REV CODE 250 W/ 637 OVERRIDE(OP): Performed by: HOSPITALIST

## 2022-10-30 PROCEDURE — 80048 BASIC METABOLIC PNL TOTAL CA: CPT

## 2022-10-30 PROCEDURE — 71260 CT THORAX DX C+: CPT

## 2022-10-30 PROCEDURE — 93005 ELECTROCARDIOGRAM TRACING: CPT

## 2022-10-30 PROCEDURE — 96365 THER/PROPH/DIAG IV INF INIT: CPT

## 2022-10-30 PROCEDURE — C9113 INJ PANTOPRAZOLE SODIUM, VIA: HCPCS | Performed by: STUDENT IN AN ORGANIZED HEALTH CARE EDUCATION/TRAINING PROGRAM

## 2022-10-30 PROCEDURE — 83605 ASSAY OF LACTIC ACID: CPT | Mod: 91

## 2022-10-30 PROCEDURE — 93005 ELECTROCARDIOGRAM TRACING: CPT | Performed by: EMERGENCY MEDICINE

## 2022-10-30 PROCEDURE — 87040 BLOOD CULTURE FOR BACTERIA: CPT

## 2022-10-30 PROCEDURE — 99285 EMERGENCY DEPT VISIT HI MDM: CPT

## 2022-10-30 PROCEDURE — 770000 HCHG ROOM/CARE - INTERMEDIATE ICU *

## 2022-10-30 PROCEDURE — 82010 KETONE BODYS QUAN: CPT

## 2022-10-30 PROCEDURE — 71045 X-RAY EXAM CHEST 1 VIEW: CPT

## 2022-10-30 PROCEDURE — 82962 GLUCOSE BLOOD TEST: CPT | Mod: 91

## 2022-10-30 PROCEDURE — 700111 HCHG RX REV CODE 636 W/ 250 OVERRIDE (IP): Performed by: STUDENT IN AN ORGANIZED HEALTH CARE EDUCATION/TRAINING PROGRAM

## 2022-10-30 PROCEDURE — 700105 HCHG RX REV CODE 258: Performed by: EMERGENCY MEDICINE

## 2022-10-30 PROCEDURE — 81001 URINALYSIS AUTO W/SCOPE: CPT

## 2022-10-30 PROCEDURE — 87641 MR-STAPH DNA AMP PROBE: CPT

## 2022-10-30 PROCEDURE — 92610 EVALUATE SWALLOWING FUNCTION: CPT

## 2022-10-30 PROCEDURE — 96375 TX/PRO/DX INJ NEW DRUG ADDON: CPT

## 2022-10-30 PROCEDURE — 85730 THROMBOPLASTIN TIME PARTIAL: CPT

## 2022-10-30 PROCEDURE — 85025 COMPLETE CBC W/AUTO DIFF WBC: CPT

## 2022-10-30 PROCEDURE — 700102 HCHG RX REV CODE 250 W/ 637 OVERRIDE(OP): Performed by: STUDENT IN AN ORGANIZED HEALTH CARE EDUCATION/TRAINING PROGRAM

## 2022-10-30 PROCEDURE — 700117 HCHG RX CONTRAST REV CODE 255: Performed by: EMERGENCY MEDICINE

## 2022-10-30 PROCEDURE — 84145 PROCALCITONIN (PCT): CPT

## 2022-10-30 PROCEDURE — 85610 PROTHROMBIN TIME: CPT

## 2022-10-30 PROCEDURE — 700111 HCHG RX REV CODE 636 W/ 250 OVERRIDE (IP): Performed by: EMERGENCY MEDICINE

## 2022-10-30 PROCEDURE — 85379 FIBRIN DEGRADATION QUANT: CPT

## 2022-10-30 RX ORDER — MIRTAZAPINE 15 MG/1
15 TABLET, FILM COATED ORAL NIGHTLY
Status: DISCONTINUED | OUTPATIENT
Start: 2022-10-30 | End: 2022-11-04 | Stop reason: HOSPADM

## 2022-10-30 RX ORDER — SODIUM CHLORIDE, SODIUM LACTATE, POTASSIUM CHLORIDE, AND CALCIUM CHLORIDE .6; .31; .03; .02 G/100ML; G/100ML; G/100ML; G/100ML
30 INJECTION, SOLUTION INTRAVENOUS ONCE
Status: COMPLETED | OUTPATIENT
Start: 2022-10-30 | End: 2022-10-30

## 2022-10-30 RX ORDER — ACETAMINOPHEN 500 MG
1000 TABLET ORAL EVERY 6 HOURS PRN
Status: DISCONTINUED | OUTPATIENT
Start: 2022-10-30 | End: 2022-11-04 | Stop reason: HOSPADM

## 2022-10-30 RX ORDER — ONDANSETRON 2 MG/ML
4 INJECTION INTRAMUSCULAR; INTRAVENOUS EVERY 4 HOURS PRN
Status: DISCONTINUED | OUTPATIENT
Start: 2022-10-30 | End: 2022-10-30

## 2022-10-30 RX ORDER — SODIUM BICARBONATE IN D5W 150/1000ML
PLASTIC BAG, INJECTION (ML) INTRAVENOUS CONTINUOUS
Status: DISCONTINUED | OUTPATIENT
Start: 2022-10-30 | End: 2022-10-30

## 2022-10-30 RX ORDER — HYDROMORPHONE HYDROCHLORIDE 1 MG/ML
0.5 INJECTION, SOLUTION INTRAMUSCULAR; INTRAVENOUS; SUBCUTANEOUS
Status: DISCONTINUED | OUTPATIENT
Start: 2022-10-30 | End: 2022-11-04 | Stop reason: HOSPADM

## 2022-10-30 RX ORDER — SODIUM CHLORIDE, SODIUM LACTATE, POTASSIUM CHLORIDE, CALCIUM CHLORIDE 600; 310; 30; 20 MG/100ML; MG/100ML; MG/100ML; MG/100ML
1000 INJECTION, SOLUTION INTRAVENOUS ONCE
Status: COMPLETED | OUTPATIENT
Start: 2022-10-30 | End: 2022-10-30

## 2022-10-30 RX ORDER — MIRTAZAPINE 15 MG/1
45 TABLET, FILM COATED ORAL NIGHTLY
COMMUNITY

## 2022-10-30 RX ORDER — MAGNESIUM SULFATE HEPTAHYDRATE 40 MG/ML
2 INJECTION, SOLUTION INTRAVENOUS
Status: DISCONTINUED | OUTPATIENT
Start: 2022-10-30 | End: 2022-10-30

## 2022-10-30 RX ORDER — NOREPINEPHRINE BITARTRATE 0.03 MG/ML
INJECTION, SOLUTION INTRAVENOUS
Status: DISCONTINUED
Start: 2022-10-30 | End: 2022-10-30

## 2022-10-30 RX ORDER — DIGOXIN 0.25 MG/ML
500 INJECTION INTRAMUSCULAR; INTRAVENOUS ONCE
Status: COMPLETED | OUTPATIENT
Start: 2022-10-30 | End: 2022-10-30

## 2022-10-30 RX ORDER — OXYCODONE HYDROCHLORIDE 5 MG/1
5 TABLET ORAL
Status: DISCONTINUED | OUTPATIENT
Start: 2022-10-30 | End: 2022-11-04 | Stop reason: HOSPADM

## 2022-10-30 RX ORDER — SODIUM BICARBONATE IN D5W 150/1000ML
100 PLASTIC BAG, INJECTION (ML) INTRAVENOUS ONCE
Status: COMPLETED | OUTPATIENT
Start: 2022-10-30 | End: 2022-10-31

## 2022-10-30 RX ORDER — SODIUM CHLORIDE, SODIUM LACTATE, POTASSIUM CHLORIDE, AND CALCIUM CHLORIDE .6; .31; .03; .02 G/100ML; G/100ML; G/100ML; G/100ML
2000 INJECTION, SOLUTION INTRAVENOUS ONCE
Status: DISCONTINUED | OUTPATIENT
Start: 2022-10-30 | End: 2022-10-30

## 2022-10-30 RX ORDER — PANTOPRAZOLE SODIUM 40 MG/10ML
40 INJECTION, POWDER, LYOPHILIZED, FOR SOLUTION INTRAVENOUS 2 TIMES DAILY
Status: DISCONTINUED | OUTPATIENT
Start: 2022-10-30 | End: 2022-11-01

## 2022-10-30 RX ORDER — SODIUM CHLORIDE 450 MG/100ML
INJECTION, SOLUTION INTRAVENOUS CONTINUOUS
Status: DISCONTINUED | OUTPATIENT
Start: 2022-10-30 | End: 2022-10-30

## 2022-10-30 RX ORDER — POLYETHYLENE GLYCOL 3350 17 G/17G
1 POWDER, FOR SOLUTION ORAL
Status: DISCONTINUED | OUTPATIENT
Start: 2022-10-30 | End: 2022-11-04 | Stop reason: HOSPADM

## 2022-10-30 RX ORDER — DEXTROSE MONOHYDRATE 25 G/50ML
25 INJECTION, SOLUTION INTRAVENOUS
Status: DISCONTINUED | OUTPATIENT
Start: 2022-10-30 | End: 2022-11-02

## 2022-10-30 RX ORDER — ONDANSETRON 4 MG/1
4 TABLET, ORALLY DISINTEGRATING ORAL EVERY 4 HOURS PRN
Status: DISCONTINUED | OUTPATIENT
Start: 2022-10-30 | End: 2022-11-04 | Stop reason: HOSPADM

## 2022-10-30 RX ORDER — DEXTROSE AND SODIUM CHLORIDE 10; .45 G/100ML; G/100ML
INJECTION, SOLUTION INTRAVENOUS CONTINUOUS
Status: DISCONTINUED | OUTPATIENT
Start: 2022-10-30 | End: 2022-10-30

## 2022-10-30 RX ORDER — AMOXICILLIN 250 MG
2 CAPSULE ORAL 2 TIMES DAILY PRN
Status: DISCONTINUED | OUTPATIENT
Start: 2022-10-30 | End: 2022-11-04 | Stop reason: HOSPADM

## 2022-10-30 RX ORDER — MAGNESIUM SULFATE HEPTAHYDRATE 40 MG/ML
4 INJECTION, SOLUTION INTRAVENOUS
Status: DISCONTINUED | OUTPATIENT
Start: 2022-10-30 | End: 2022-10-30

## 2022-10-30 RX ORDER — HEPARIN SODIUM 5000 [USP'U]/ML
5000 INJECTION, SOLUTION INTRAVENOUS; SUBCUTANEOUS EVERY 8 HOURS
Status: DISCONTINUED | OUTPATIENT
Start: 2022-10-30 | End: 2022-11-04 | Stop reason: HOSPADM

## 2022-10-30 RX ORDER — OXYCODONE HYDROCHLORIDE 10 MG/1
10 TABLET ORAL
Status: DISCONTINUED | OUTPATIENT
Start: 2022-10-30 | End: 2022-11-04 | Stop reason: HOSPADM

## 2022-10-30 RX ORDER — SODIUM CHLORIDE 9 MG/ML
2000 INJECTION, SOLUTION INTRAVENOUS ONCE
Status: DISCONTINUED | OUTPATIENT
Start: 2022-10-30 | End: 2022-10-30

## 2022-10-30 RX ORDER — DEXTROSE AND SODIUM CHLORIDE 5; .45 G/100ML; G/100ML
INJECTION, SOLUTION INTRAVENOUS CONTINUOUS
Status: DISCONTINUED | OUTPATIENT
Start: 2022-10-30 | End: 2022-10-30

## 2022-10-30 RX ORDER — BISACODYL 10 MG
10 SUPPOSITORY, RECTAL RECTAL
Status: DISCONTINUED | OUTPATIENT
Start: 2022-10-30 | End: 2022-11-04 | Stop reason: HOSPADM

## 2022-10-30 RX ADMIN — PIPERACILLIN AND TAZOBACTAM 3.38 G: 3; .375 INJECTION, POWDER, LYOPHILIZED, FOR SOLUTION INTRAVENOUS; PARENTERAL at 20:37

## 2022-10-30 RX ADMIN — DIGOXIN 500 MCG: 0.25 INJECTION INTRAMUSCULAR; INTRAVENOUS at 07:30

## 2022-10-30 RX ADMIN — NYSTATIN 500000 UNITS: 100000 SUSPENSION ORAL at 17:33

## 2022-10-30 RX ADMIN — INSULIN HUMAN 4 UNITS: 100 INJECTION, SOLUTION PARENTERAL at 17:31

## 2022-10-30 RX ADMIN — PIPERACILLIN AND TAZOBACTAM 3.38 G: 3; .375 INJECTION, POWDER, LYOPHILIZED, FOR SOLUTION INTRAVENOUS; PARENTERAL at 11:24

## 2022-10-30 RX ADMIN — INSULIN HUMAN 7 UNITS: 100 INJECTION, SOLUTION PARENTERAL at 11:34

## 2022-10-30 RX ADMIN — PANTOPRAZOLE SODIUM 40 MG: 40 INJECTION, POWDER, LYOPHILIZED, FOR SOLUTION INTRAVENOUS at 17:33

## 2022-10-30 RX ADMIN — PANTOPRAZOLE SODIUM 40 MG: 40 INJECTION, POWDER, LYOPHILIZED, FOR SOLUTION INTRAVENOUS at 11:24

## 2022-10-30 RX ADMIN — OXYCODONE HYDROCHLORIDE 10 MG: 10 TABLET ORAL at 11:25

## 2022-10-30 RX ADMIN — INSULIN HUMAN 25 UNITS: 100 INJECTION, SUSPENSION SUBCUTANEOUS at 12:53

## 2022-10-30 RX ADMIN — VANCOMYCIN HYDROCHLORIDE 2250 MG: 500 INJECTION, POWDER, LYOPHILIZED, FOR SOLUTION INTRAVENOUS at 07:45

## 2022-10-30 RX ADMIN — SODIUM CHLORIDE, POTASSIUM CHLORIDE, SODIUM LACTATE AND CALCIUM CHLORIDE 1779 ML: 600; 310; 30; 20 INJECTION, SOLUTION INTRAVENOUS at 07:30

## 2022-10-30 RX ADMIN — HEPARIN SODIUM 5000 UNITS: 5000 INJECTION, SOLUTION INTRAVENOUS; SUBCUTANEOUS at 14:46

## 2022-10-30 RX ADMIN — NYSTATIN 500000 UNITS: 100000 SUSPENSION ORAL at 12:55

## 2022-10-30 RX ADMIN — SODIUM BICARBONATE 100 ML/HR: 84 INJECTION, SOLUTION INTRAVENOUS at 18:36

## 2022-10-30 RX ADMIN — NYSTATIN 500000 UNITS: 100000 SUSPENSION ORAL at 20:37

## 2022-10-30 RX ADMIN — HEPARIN SODIUM 5000 UNITS: 5000 INJECTION, SOLUTION INTRAVENOUS; SUBCUTANEOUS at 21:08

## 2022-10-30 RX ADMIN — MIRTAZAPINE 15 MG: 15 TABLET, FILM COATED ORAL at 20:58

## 2022-10-30 RX ADMIN — OXYCODONE HYDROCHLORIDE 10 MG: 10 TABLET ORAL at 21:08

## 2022-10-30 RX ADMIN — OXYCODONE HYDROCHLORIDE 10 MG: 10 TABLET ORAL at 14:59

## 2022-10-30 RX ADMIN — OXYCODONE HYDROCHLORIDE 10 MG: 10 TABLET ORAL at 18:18

## 2022-10-30 RX ADMIN — INSULIN HUMAN 25 UNITS: 100 INJECTION, SUSPENSION SUBCUTANEOUS at 17:30

## 2022-10-30 RX ADMIN — METOPROLOL TARTRATE 25 MG: 25 TABLET, FILM COATED ORAL at 11:25

## 2022-10-30 RX ADMIN — SODIUM CHLORIDE, POTASSIUM CHLORIDE, SODIUM LACTATE AND CALCIUM CHLORIDE 1000 ML: 600; 310; 30; 20 INJECTION, SOLUTION INTRAVENOUS at 07:06

## 2022-10-30 RX ADMIN — IOHEXOL 100 ML: 350 INJECTION, SOLUTION INTRAVENOUS at 07:47

## 2022-10-30 RX ADMIN — NYSTATIN 500000 UNITS: 100000 SUSPENSION ORAL at 11:24

## 2022-10-30 RX ADMIN — METOPROLOL TARTRATE 25 MG: 25 TABLET, FILM COATED ORAL at 21:08

## 2022-10-30 ASSESSMENT — PAIN DESCRIPTION - PAIN TYPE
TYPE: ACUTE PAIN
TYPE: CHRONIC PAIN
TYPE: ACUTE PAIN
TYPE: CHRONIC PAIN
TYPE: CHRONIC PAIN

## 2022-10-30 ASSESSMENT — PATIENT HEALTH QUESTIONNAIRE - PHQ9
8. MOVING OR SPEAKING SO SLOWLY THAT OTHER PEOPLE COULD HAVE NOTICED. OR THE OPPOSITE, BEING SO FIGETY OR RESTLESS THAT YOU HAVE BEEN MOVING AROUND A LOT MORE THAN USUAL: MORE THAN HALF THE DAYS
2. FEELING DOWN, DEPRESSED, IRRITABLE, OR HOPELESS: NEARLY EVERY DAY
SUM OF ALL RESPONSES TO PHQ9 QUESTIONS 1 AND 2: 3
4. FEELING TIRED OR HAVING LITTLE ENERGY: MORE THAN HALF THE DAYS
3. TROUBLE FALLING OR STAYING ASLEEP OR SLEEPING TOO MUCH: MORE THAN HALF THE DAYS
SUM OF ALL RESPONSES TO PHQ QUESTIONS 1-9: 14
7. TROUBLE CONCENTRATING ON THINGS, SUCH AS READING THE NEWSPAPER OR WATCHING TELEVISION: MORE THAN HALF THE DAYS
1. LITTLE INTEREST OR PLEASURE IN DOING THINGS: NOT AT ALL
9. THOUGHTS THAT YOU WOULD BE BETTER OFF DEAD, OR OF HURTING YOURSELF: SEVERAL DAYS
5. POOR APPETITE OR OVEREATING: NOT AT ALL
6. FEELING BAD ABOUT YOURSELF - OR THAT YOU ARE A FAILURE OR HAVE LET YOURSELF OR YOUR FAMILY DOWN: MORE THAN HALF THE DAYS

## 2022-10-30 ASSESSMENT — ENCOUNTER SYMPTOMS
CONSTIPATION: 0
HEMOPTYSIS: 0
LOSS OF CONSCIOUSNESS: 0
HEADACHES: 0
FLANK PAIN: 0
DIARRHEA: 1
FEVER: 1
DEPRESSION: 1
SINUS PAIN: 0
WEAKNESS: 1
EYE PAIN: 0
ABDOMINAL PAIN: 1
SORE THROAT: 1
WEIGHT LOSS: 1
FALLS: 1
DIZZINESS: 1
ORTHOPNEA: 1
BACK PAIN: 0
CHILLS: 1
NAUSEA: 1
SEIZURES: 0
BLOOD IN STOOL: 0
MYALGIAS: 0
BRUISES/BLEEDS EASILY: 0
NERVOUS/ANXIOUS: 1
VOMITING: 1
SHORTNESS OF BREATH: 1
NECK PAIN: 0

## 2022-10-30 ASSESSMENT — LIFESTYLE VARIABLES
TOTAL SCORE: 0
EVER FELT BAD OR GUILTY ABOUT YOUR DRINKING: NO
HAVE YOU EVER FELT YOU SHOULD CUT DOWN ON YOUR DRINKING: NO
ALCOHOL_USE: NO
HOW MANY TIMES IN THE PAST YEAR HAVE YOU HAD 5 OR MORE DRINKS IN A DAY: 0
TOTAL SCORE: 0
CONSUMPTION TOTAL: NEGATIVE
AVERAGE NUMBER OF DAYS PER WEEK YOU HAVE A DRINK CONTAINING ALCOHOL: 0
ON A TYPICAL DAY WHEN YOU DRINK ALCOHOL HOW MANY DRINKS DO YOU HAVE: 0
DOES PATIENT WANT TO STOP DRINKING: NO
TOTAL SCORE: 0
HAVE PEOPLE ANNOYED YOU BY CRITICIZING YOUR DRINKING: NO
EVER HAD A DRINK FIRST THING IN THE MORNING TO STEADY YOUR NERVES TO GET RID OF A HANGOVER: NO

## 2022-10-30 ASSESSMENT — FIBROSIS 4 INDEX
FIB4 SCORE: 1.047619047619047619
FIB4 SCORE: 0.63

## 2022-10-30 NOTE — ED PROVIDER NOTES
"ED Provider Note    Scribed for Melanie Bowen M.D. by Fartun Zurita. 10/30/2022  6:57 AM    Primary care provider: Pcp Pt States None  Means of arrival: EMS  History obtained from: Patient  History limited by: None    CHIEF COMPLAINT  Chief Complaint   Patient presents with    Irregular Heart Beat     Transfer from [facility], [place] fib RVR, hypotension, epigastric pain    Hypotension     Septic protocol ordered for [facility]  Asmita Willis is a 67 y.o. female who presents to the Emergency Department as a transfer from [place] with atrial fibrillation RVR sepsis leukocytosis and abdominal and chest pain.  The patient was given IV Zosyn, IV fluids and diltiazem at [facility] and labs were performed.  She was sent here for further work-up and admission.  The patient notes that her esophagus was dilated on October 6th and she has not felt well since this happened.  She has been having diarrhea since October 6.  She has abdominal pain.  She denies any coughing or sore throat.  She feels very weak.  She is a diabetic.  She does not take any blood thinning medications for the atrial fibrillation.  Per nurse, the patient began to get hypotensive in flight when EMS was landing. She has associated diarrhea onset 2 months ago, coughing, and vomiting \"coffee grounds,\"  but denies dysuria. The patient smokes \"little\" weed.       REVIEW OF SYSTEMS  HEENT:  No ear pain, congestion, or sore throat   EYES: no discharge, redness, or vision changes  CARDIAC: no chest pain, positive palpitations    PULMONARY: Positive for cough, no dyspnea or congestion   GI: Vomiting coffee grounds, Positive for diarrhea, positive abdominal pain   : no dysuria, back pain, or hematuria   Neuro: Generalized weakness, numbness, aphasia, or headache  Musculoskeletal: no swelling, deformity, pain, or joint swelling  Endocrine: no fevers, sweating, or weight loss   SKIN: no rash, erythema, or contusions     See history of present illness. " "All other systems are negative. C.    PAST MEDICAL HISTORY   has a past medical history of Diabetes, Hypertension, and Seizure disorder (HCC).    SURGICAL HISTORY   has a past surgical history that includes upper gi endoscopy,diagnosis (N/A, 6/15/2022) and colonoscopy,diagnostic (N/A, 6/15/2022).    SOCIAL HISTORY  Social History     Tobacco Use    Smoking status: Former     Packs/day: 2.00     Years: 51.00     Pack years: 102.00     Types: Cigarettes     Quit date:      Years since quittin.8    Smokeless tobacco: Never   Vaping Use    Vaping Use: Never used   Substance Use Topics    Alcohol use: Not Currently    Drug use: Yes     Types: Inhaled     Comment: marijuana some days, prior methamphetamine      Social History     Substance and Sexual Activity   Drug Use None noted when reviewed.     Comment: marijuana       FAMILY HISTORY  Family History   Problem Relation Age of Onset    Diabetes Mother     Hypertension Father     Diabetes Sister     Hypertension Brother     Colon Cancer Neg Hx        CURRENT MEDICATIONS  Home Medications       Reviewed by Tye Koehler M.D. (Physician) on 10/30/22 at 0917  Med List Status: Complete     Medication Last Dose Status   insulin NPH (HUMULIN/NOVOLIN) 100 UNIT/ML Suspension  Active   insulin regular human (HUMULIN/NOVOLIN R)  Active   mirtazapine (REMERON) 15 MG Tab  Active                    ALLERGIES  Allergies   Allergen Reactions    Other Drug      Pt states she is allergic to \"ALL\" -javad like lidocaine       PHYSICAL EXAM  VITAL SIGNS: BP (!) 80/50   Pulse (!) 146   Temp (!) 35.7 °C (96.3 °F) (Temporal)   Resp (!) 33   Ht 1.676 m (5' 6\")   Wt 86.2 kg (190 lb)   SpO2 90%   BMI 30.67 kg/m²     Constitutional: Well developed, Well nourished, No acute distress, Non-toxic appearance.   HEENT: Normocephalic, Atraumatic,  external ears normal, pharynx pink,  Mucous  Membranes dry, No rhinorrhea or mucosal edema  Eyes: PERRL, EOMI, Conjunctiva normal, No " discharge.   Neck: Normal range of motion, No tenderness, Supple, No stridor.   Lymphatic: No lymphadenopathy    Cardiovascular: Tachycardic irregularly irregular rate and rhythm, No murmurs,  rubs, or gallops.   Thorax & Lungs: Lungs clear to auscultation bilaterally, No respiratory distress, No wheezes, rhales or rhonchi, No chest wall tenderness.   Abdomen: Tender to palpation in epigastrium, Bowel sounds normal, Soft, non distended,  No pulsatile masses., no rebound guarding or peritoneal signs.  Evated BMI  Skin: Warm, Dry, No erythema, No rash,   Back:  No CVA tenderness,  No spinal tenderness, bony crepitance, step offs, or instability.   Neurologic: Alert & oriented clear speech no focal deficits  Extremities: No extremity edema, Extremities cool to the touch, Equal, intact distal pulses, No cyanosis, or clubbing,  No tenderness.   Musculoskeletal: Good range of motion in all major joints. No tenderness to palpation or major deformities noted.       LABS  Results for orders placed or performed during the hospital encounter of 10/30/22   Lactic acid (lactate)   Result Value Ref Range    Lactic Acid 2.9 (H) 0.5 - 2.0 mmol/L   CBC With Differential   Result Value Ref Range    WBC 27.3 (H) 4.8 - 10.8 K/uL    RBC 5.67 (H) 4.20 - 5.40 M/uL    Hemoglobin 13.7 12.0 - 16.0 g/dL    Hematocrit 44.0 37.0 - 47.0 %    MCV 77.6 (L) 81.4 - 97.8 fL    MCH 24.2 (L) 27.0 - 33.0 pg    MCHC 31.1 (L) 33.6 - 35.0 g/dL    RDW 58.1 (H) 35.9 - 50.0 fL    Platelet Count 413 164 - 446 K/uL    MPV 10.1 9.0 - 12.9 fL    Neutrophils-Polys 88.30 (H) 44.00 - 72.00 %    Lymphocytes 5.50 (L) 22.00 - 41.00 %    Monocytes 5.20 0.00 - 13.40 %    Eosinophils 0.00 0.00 - 6.90 %    Basophils 0.20 0.00 - 1.80 %    Immature Granulocytes 0.80 0.00 - 0.90 %    Nucleated RBC 0.00 /100 WBC    Neutrophils (Absolute) 24.08 (H) 2.00 - 7.15 K/uL    Lymphs (Absolute) 1.50 1.00 - 4.80 K/uL    Monos (Absolute) 1.41 (H) 0.00 - 0.85 K/uL    Eos (Absolute) 0.00  0.00 - 0.51 K/uL    Baso (Absolute) 0.05 0.00 - 0.12 K/uL    Immature Granulocytes (abs) 0.21 (H) 0.00 - 0.11 K/uL    NRBC (Absolute) 0.00 K/uL   Comp Metabolic Panel   Result Value Ref Range    Sodium 136 135 - 145 mmol/L    Potassium 3.7 3.6 - 5.5 mmol/L    Chloride 100 96 - 112 mmol/L    Co2 18 (L) 20 - 33 mmol/L    Anion Gap 18.0 (H) 7.0 - 16.0    Glucose 373 (H) 65 - 99 mg/dL    Bun 59 (H) 8 - 22 mg/dL    Creatinine 2.29 (H) 0.50 - 1.40 mg/dL    Calcium 8.4 (L) 8.5 - 10.5 mg/dL    AST(SGOT) 13 12 - 45 U/L    ALT(SGPT) 11 2 - 50 U/L    Alkaline Phosphatase 78 30 - 99 U/L    Total Bilirubin 0.9 0.1 - 1.5 mg/dL    Albumin 3.7 3.2 - 4.9 g/dL    Total Protein 6.8 6.0 - 8.2 g/dL    Globulin 3.1 1.9 - 3.5 g/dL    A-G Ratio 1.2 g/dL   PT/INR   Result Value Ref Range    PT 14.7 (H) 12.0 - 14.6 sec    INR 1.16 (H) 0.87 - 1.13   PTT   Result Value Ref Range    APTT 28.8 24.7 - 36.0 sec   ESTIMATED GFR   Result Value Ref Range    GFR (CKD-EPI) 23 (A) >60 mL/min/1.73 m 2   BETA-HYDROXYBUTYRIC ACID   Result Value Ref Range    beta-Hydroxybutyric Acid 0.74 (H) 0.02 - 0.27 mmol/L   PROCALCITONIN   Result Value Ref Range    Procalcitonin 0.19 <0.25 ng/mL   Lactic Acid -STAT Once   Result Value Ref Range    Lactic Acid 1.5 0.5 - 2.0 mmol/L   D-DIMER   Result Value Ref Range    D-Dimer Screen 7.45 (H) 0.00 - 0.50 ug/mL (FEU)   EKG   Result Value Ref Range    Report       [facility] Emergency Dept.    Test Date:  2022-10-30  Pt Name:    TJ LUCERO                Department: ER  MRN:        [id number]                      Room:       [facility]  Gender:     Female                       Technician: [id number]  :        1955                   Requested By:ER TRIAGE PROTOCOL  Order #:    [id number]                    Reading MD: JEAN-PIERRE GOVEA MD    Measurements  Intervals                                Axis  Rate:       166                          P:  DE:                                      QRS:        19  QRSD:        91                           T:          100  QT:         314  QTc:        523    Interpretive Statements  Atrial fibrillation with rapid V-rate  Borderline low voltage, extremity leads  LVH with secondary repolarization abnormality  Compared to ECG 06/18/2022 03:34:03  Left ventricular hypertrophy now present  Early repolarization now present  Sinus rhythm no longer present  T-wave abnormality no longer present  Electronic ally Signed On 10- 8:03:02 PDT by JEAN-PIERRE GOVEA MD        All labs reviewed by me.      RADIOLOGY  CT-CHEST,ABDOMEN,PELVIS WITH   Final Result   Addendum (preliminary) 1 of 1   Additional history is provided that she has esophagitis and there is    recent esophageal dilation.      There is no mediastinal air. Therefore, there is no evidence for rupture.      The esophagus does appear to have wall thickening consistent with    inflammation.      Final      1.  No inflammatory process identified within the chest, abdomen, pelvis      2.  Mild intrahepatic dilation, nonspecific      3.  Mild bilateral atelectasis and small bilateral pleural effusion      4.  Marked left ventricular hypertrophy      5.  Small kidney consistent with sequela of chronic process      6.  Splenic calcification consistent with granuloma      7.  Enlarged uterus containing an 8.3 cm mass consistent with a fibroid      DX-CHEST-PORTABLE (1 VIEW)   Final Result         1.  No acute cardiopulmonary abnormality.   2.  Prominent pericardial fat again noted.        The radiologist's interpretation of all radiological studies have been reviewed by me.    COURSE & MEDICAL DECISION MAKING  Nursing notes, VS, PMSFHx reviewed in chart.    6:57 AM Patient seen and examined at bedside. I informed the patient that that she will likely have to be admitted. The patient had the opportunity to ask any questions. The plan for hospitalization was discussed with the patient given their current presentation and diagnostic study  results. Patient is understanding and agreeable to the plan for hospitalization. Patient verbalizes understanding and agreement to this plan of care.  The patient was treated for atrial fibrillation with rapid ventricular response with Lanoxin 500 mcg via injection,  LR Bolus, and Vancocin 2,250 mg in  mL IVPB started per the sepsis protocol.  Ordered EKG, Blood Culture, Urine Culture, UA, CMP, CBC w/ Diff, Lactic Acid, PT/INR, PTT, Lactic Acid 2, CXR, and CT Chest, Abdomen, Pelvis With to evaluate her symptoms. The differential diagnoses include but are not limited to: Sepsis, A-fib with RVR, Hyperglycemia, DKA, Complication from Esophageal perforation, colitis, Colonitis, C. Diff, UTI, or Urosepsis    7:10 AM -upon [facility]: the patient's white count is 30.7, she has 80% neutrophills, 439 Glucose, 2 Creatinine, 64 Albumin, 16% CO2, 23 GAP, Normal Liver function test, and 4.4 Lactate. She was also given a liter of fluid and deltasone.    7:24 AM - Paged ICU    7:29 AM - I discussed the patient's case and the above findings with Dr. Nagy (ICU) who agreed to consult as long as she does not have a surgical emergency.  Patient CT did not show any esophageal perforation or surgical emergency so she will be admitted to the [facility] for further care.  Upon recheck after fluids and digoxin her heart rate is improved as well as her blood pressure.    8:34 - Paged hospitalist.    8:40 AM -  I discussed the patient's case and the above findings with Dr. Koehler (Hospitalist) who agreed to evaluate patient for hospitalization.     HYDRATION: Based on the patient's presentation of Sepsis the patient was given IV fluids. IV Hydration was used because oral hydration was not adequate alone. Upon recheck following hydration, the patient was improved.    CRITICAL CARE  The very real possibilty of a deterioration of this patient's condition required the highest level of my preparedness for  sudden, emergent intervention.  I provided critical care services, which included medication orders, frequent reevaluations of the patient's condition and response to treatment, ordering and reviewing test results, and discussing the case with various consultants.  The critical care time associated with the care of the patient was 40 minutes. Review chart for interventions. This time is exclusive of any other billable procedures.      Medical Decision Making  Problems (number and complexity of problems being simultaneously addressed)        Atrial fibrillation with rapid ventricular response        Leukocytosis with concern for sepsis        Hypotension        Acute on chronic kidney injury  Data ( amount or complexity of data reviewed and analysed, discuss why you are or are not doing tests or giving medication)        We ordered labs, CT of Chest Abdomen and Pelvis to rule out bowel perforation. None was found. Her white count still is elevated and shows risk for sepsis. We treated with Lanoxin as this will help control her heart rate but not drop her blood pressure any further. EKG and troponin were performed to make sure she didn't have demand ischemia. We treated for C. Diff and Colitis with IV vancomycin. Her blood pressure and heart rate are improved. She is hyperglycemic but she has lactic acidosis and does not appear to be in DKA.  She will be admitted to [facility] in serious condition. I spoke to Dr. Koehler and Dr. Nagy who both care for the patient.   3. Risk (risk of complications and or morbidity/mortality of the patient managed. Discuss social determinants and compliance issues)        High risk.      DISPOSITION:  Patient will be hospitalized by Dr. Koehler in Critical condition.     FINAL IMPRESSION  1. Sepsis, due to unspecified organism, unspecified whether acute organ dysfunction present (HCC)    2. Atrial fibrillation with rapid ventricular response (HCC)    3. Diarrhea of presumed infectious origin     4. Acute renal failure with other specified pathological kidney lesion superimposed on chronic kidney disease, unspecified CKD stage (HCC)    5. Hyperglycemia     3. Total Critical Care time was 40 minutes     Fartun FIGUEROA (Lillie), am scribing for, and in the presence of, Melanie Bowen M.D..    Electronically signed by: Fartun Zurita (Lillie), 10/30/2022    Melanie FIGUEROA M.D. personally performed the services described in this documentation, as scribed by Fartun Zurita in my presence, and it is both accurate and complete. C    The note accurately reflects work and decisions made by me.  Melanie Bowen M.D.  10/30/2022  11:55 AM

## 2022-10-30 NOTE — ASSESSMENT & PLAN NOTE
DC amlodipine  Holding ACEI (due to TAO)  Continue metoprolol tartrate 25 mg every 8 hourly for A. Fib.  Continue to monitor.

## 2022-10-30 NOTE — ED NOTES
Med Rec completed per pt  Allergies reviewed      Pt denies taking any RX's or OTC's in over 1 month

## 2022-10-30 NOTE — DIETARY
Nutrition Services: Brief Update    Received Voalte message from SLP that pt may benefit from oral nutrition supplements.  RD will add supplement order and adjust menu for pt to receive Boost Glucose Control (hx of T2DM, on a consistent CHO diet).    RD will follow per dept guidelines.

## 2022-10-30 NOTE — CONSULTS
Critical Care Medicine Faculty Consult Note    Consulted by: Dr Melanie Bowen    Reason for consult: afib w/ rvr sepsis and abdominal pain    HPI: 67y F Hx of esophagitis, gastritis, doudenitis that just had an esophageal dilation in [place], [place]. Also with dx of DM, CKD, pericardial effusion, LVH, chroni pain, aspiration pna, NSTEMI, uterine mass, major depression, recent echo with moderate LVH good BiV function. She is DNR/DNI. That presented to [place] ED with 3 days of chills and mid chest and upper stomach pain. She was in Afib and hypotensive with 30k WBC so was emergently transferred here without further workup. She was afebrile, -130 in afib, SBP first SBP was 80/50 since has had map 70-90's on 2.5L n/c satting 95%. She was found to have WBC of 27k, hg 13 plt 413 with left shift of 88%, Na 136, K 3.7, CO2 18 AG 18, BUN 59, cr 2.29, glucose of 373, lactate of 2.9, CXR benign, CT chest abdomen pelvis with no acute pathology and personally reviewed with radiology. She was given fluid bolus, board spectrum antibiotics and digoxin. I was asked for consultation by Dr Bowen.     Exam:Mild ill, chronic poor health, obese female in red 1, no respiratory distress, lungs cta, heart irregular irregular, abdomen tenderness in upper abdomen, ext with some mottling to knees no edema, neuro intact easily anxious, moves all strong.     A/P:  Severe sepsis: unclear source possible bacteremia from GI in nature, follow up U/A, board spectrum antibiotics. Aggressive fluid resus with LVH and likely needing more elevated filling pressures. No further shock in ER. However still sick and recommend IMCU level care and observation.     TAO: has chronic atrophic appearing kidneys on CT scan. Likely prerenal in nature. Recommend fluid resus, cpk, u/a no need for renal U/S with ct scan. Avoid nephrotoxins.     AG acidosis: likely lactate related check beta hydroxy level rule out mild dka.     DM: aggressive sliding scale  coverage    Afib: mainly likely driven by sepsis and volume dehydrated recommend further volume resus, check TSH would limit rate controlling drugs at this time. Consider beta blockers once more stable with her LVH and longer filling time likely good choice and treatment of anxiety.     Patient will be admitted to IMCU under the care of Dr Koehler. Call with questions and concerns or change in patient status.     Patient remains in critical condition from sepsis afib with RVR . Critical care time provided was 41 minutes. This excludes all separate billable procedures.     Alexis Nagy MD  Critical Care Medicine

## 2022-10-30 NOTE — ASSESSMENT & PLAN NOTE
Visible oral thrush with reported odynophagia  Likely due to esophagitis and altered mucosal surface  Nystatin swish & swallow

## 2022-10-30 NOTE — THERAPY
"Speech Language Pathology   Clinical Swallow Evaluation     Patient Name: Asmita Willis  AGE:  67 y.o., SEX:  female  Medical Record #: [id number]  Today's Date: 10/30/2022     Precautions  Precautions: (P) Fall Risk, Swallow Precautions ( See Comments)    HPI:68 YO female admitted 10/30 2/2 severe sepsis, DKA and hematemesis. PMHx:   CMHx: afib with RVR, DKA, severe sepsis with acute organ dysfunction, fall, esophageal thrush, esophagitis, high anion gap metabolic acidosis, acute respiratory failure with hypoxemia, hematemsis, chest pain, TAO, chornic bilateral low back pain, anxiety and depression, primary HTN. CT chest/abdomen/pelvis 10/30: \"1.  No inflammatory process identified within the chest, abdomen, pelvis2.  Mild intrahepatic dilation, nonspecific3.  Mild bilateral atelectasis and small bilateral pleural effusion4.  Marked left ventricular hypertrophy5.  Small kidney consistent with sequela of chronic process6. Splenic calcification consistent with granuloma7.  Enlarged uterus containing an 8.3 cm mass consistent with a fibroid\". CXR 10/30 \"1.  No acute cardiopulmonary abnormality.2.  Prominent pericardial fat again noted.\" Last seen by SLP June 2022 with recs for puree/thins.     Level of Consciousness: Alert, Awake  Affect/Behavior: Appears depressed, Calm, Cooperative  Follows Directives: Yes  Orientation: Oriented x 4  Hearing: Functional hearing  Vision: Functional vision    Prior Living Situation & Level of Function:  Pt reports recent weight loss of 77 pounds over past 2-2.5 months. Pt reports recent esophageal dilation (10/6/22), after which she had worsening reflux. Pt reports feeling of food being stuck at the level of the midsternum and delayed regurgitation of thick or advanced textures, denies difficulty with liquids.     Oral Mechanism Evaluation  Facial Symmetry: Equal  Facial Sensation: Equal  Labial Observations: WFL  Lingual Observations: Midline  Dentition: " Edentulous  Comments:    Voice  Quality: Hoarse  Resonance: WFL  Intensity: Appropriate  Cough: WFL  Comments:    Current Method of Nutrition   Oral diet (MM5/TN0)    Subjective  Pt alert, followed directions and participated fully.      Assessment  Positioning: Dominguez's (60-90 degrees)  Bolus Administration: Patient  Oxygen Requirements:  4 L Nasal Cannula  Factor(s) Affecting Performance: None    Swallowing Trials  Thin Liquid (TN0): WFL  Liquidised (LQ3): WFL  Pureed (PU4): Impaired  Regular (RG7): Impaired but functional    Comments:  Adequate bolus acceptance and containment noted. Timely swallow initiation and baseline hoarse vocal quality appreciated. Pt reported sensation of food being stuck at the level of the mid sternum, followed by delayed coughing and regurgitation into the oral cavity requiring oral suctioning with puree, which is concerning for esophageal dysphagia. Pt demonstrated prolonged mastication of transitional solid (erma cracker), but was able to work into a paste and wash down with water without difficulty. No other s/sx of aspiration appreciated.     Clinical Impressions  Clinical signs of esophageal dysphagia include belching followed by coughing, report of food getting stuck, and regurgitation. Dysphagia is likely chronic related to recent esophageal dilation and esophagitis. Instrumental swallow study  may be  indicated to objectively assess swallow function and further direct POC. Short term diet modification is indicated.     Recommendations  LIQUIDIZED/ THINS DIET WITH REFLUX PRECAUTIONS (erma crackers okay)  Instrumentation: Instrumental swallow study pending clinical progress  Swallowing Instructions & Precautions:   Supervision: Close supervision - patient may be left alone for less than 5 minutes at a time  Positioning: Fully upright and midline during oral intake, Remain upright for 45 minutes after oral intake  Medication: Float small and crush large pills in applesauce.  "  Strategies: Small bites/sips, Alternate bites and sips, Slow rate of intake  Oral Care: BID  4. Consider Esophagram given s/sx of esophageal dysphagia if medically appropriate  5. Requested supplements from RD, given extensive weight loss and inability to consume solids without regurgitation.    SLP following.       Plan    Recommend Speech Therapy 3 times per week until therapy goals are met for the following treatments:  Dysphagia Training.    Discharge Recommendations: (P) Recommend post-acute placement for additional speech therapy services prior to discharge home         Objective       10/30/22 1413   Charge Group   SLP Oral Pharyngeal Evaluation Oral Pharyngeal Evaluation   Total Treatment Time   SLP Time Spent Yes   SLP Oral Pharyngeal Evaluation (Mins) 16   Initial Contact Note    Initial Contact Note  Order Received and Verified, Speech Therapy Evaluation in Progress with Full Report to Follow.   Precautions   Precautions Fall Risk;Swallow Precautions ( See Comments)   Vitals   O2 (LPM) 4   O2 Delivery Device Silicone Nasal Cannula   Pain 0 - 10 Group   Therapist Pain Assessment Post Activity Pain Same as Prior to Activity;Nurse Notified;0   Prior Living Situation   Housing / Facility 1 Story House   Lives with - Patient's Self Care Capacity Spouse;Adult Children;Child Less than 18 Years of Age   Prior Level Of Function   Communication Within Functional Limits   Swallow Impaired   Dentition Edentulous   Dentures   (reports full set at home)   Hearing Aid None   Vision Within Functional Limits for Evaluation   Patient's Primary Language English   Dysphagia Rating   Nutritional Liquid Intake Rating Scale Non thickened beverages   Nutritional Food Intake Rating Scale Total oral diet of a single consistency   Patient / Family Goals   Patient / Family Goal #1 \"It's terrible not being able to eat\"   Short Term Goals   Short Term Goal # 1 Pt will consume a diet of liquidized/thins with no s/sx of aspiration and " min cues.   Education Group   Education Provided Dysphagia   Dysphagia Patient Response Patient;Explanation;Demonstration;Verbal Demonstration;Action Demonstration   Anticipated Discharge Needs   Discharge Recommendations Recommend post-acute placement for additional speech therapy services prior to discharge home   Therapy Recommendations Upon DC Dysphagia Training;Community Re-Integration;Patient / Family / Caregiver Education   Interdisciplinary Plan of Care Collaboration   IDT Collaboration with  Physician;Nursing   Patient Position at End of Therapy Seated;In Bed;Bed Alarm On;Call Light within Reach;Tray Table within Reach;Phone within Reach   Collaboration Comments RN and MD aware of results/recs

## 2022-10-30 NOTE — ASSESSMENT & PLAN NOTE
EGD 6/14 with Dr. Jo demonstrated severe Grade D esophagitis  Follows with Dr. Dos Santos, s/p dilation 10/6, has not been taking PPI  IV PPI, transition to PO BID when stable  Red flags of weight loss, odynophagia: GI consult - unlikely to need acute intervention  SLP eval for odynophagia  Sucralfate slurry  Checking esophogram.

## 2022-10-30 NOTE — ASSESSMENT & PLAN NOTE
This is Severe Sepsis Present on admission  Source is Likely Gastrointestinal / Esophageal  Sepsis protocol initiated    CT CAP without apparent inflammatory etiology  Likely due to esophagitis  Reports no diarrhea in preceding 5 days, if 3+ BMs daily will order Cdiff  Empiric vancomycin & zosyn at [facility] ED  MRSA nares came back negative and discontinued vancomycin.  Procalcitonin low  Cultures neg  DC Pip/Tazo; start amp/sulbactam.  Plan 5 days total of therapy assuming she cont's to do well

## 2022-10-30 NOTE — ED TRIAGE NOTES
"Chief Complaint   Patient presents with    Irregular Heart Beat     Transfer from [facility] fib RVR, hypotension, epigastric pain    Hypotension     Septic protocol ordered for [facility] for above complaint. Pt qualified code 5 sepsis, code sepsis called overhead.     EKG and septic protocols ordered. Labs drawn. IV placed.      BP (!) 80/50   Pulse (!) 146   Temp (!) 35.7 °C (96.3 °F) (Temporal)   Resp (!) 33   Ht 1.676 m (5' 6\")   Wt 86.2 kg (190 lb)   SpO2 90%   BMI 30.67 kg/m²      "

## 2022-10-30 NOTE — ASSESSMENT & PLAN NOTE
The ASCVD Risk score (Filemon COOL, et al., 2019) failed to calculate for the following reasons:    The patient has a prior MI or stroke diagnosis  Likely due to esophagitis  Trop 200-300 range which appears to be her baseline on review of previous labs  negative stress test June 2012  EKG without ischemic signs  CXR unremarkable  Telemetry  GI cocktail contraindicated due to lidocaine allergy  IV PPI  Continue on sucralfate

## 2022-10-30 NOTE — ASSESSMENT & PLAN NOTE
Likely due to obesity  APAP PRN  NSAIDs contraindicated due to TAO and gastritis  Pain protocol - oxycodone and IV dilaudid PRN  Gabapentin deferred per patient protocol due to odynophagia  Cannot do lidocaine patch due to allergy

## 2022-10-30 NOTE — PROGRESS NOTES
2 RN Skin Assessment Completed by Guillaume RN and WILLY Yi.    Head: WDL  Ears: WDL  Nose: WDL  Mouth: WDL - tongue white, full dentures not with patient  Neck: Redness and blanching  Breasts/Chest: redness and blanching  Shoulder Blades: redness and blanching  Spine: redness and blanching  (R) Arm/Elbow/hand: WDL  (L) Arm/Elbow/hand: WDL - thumb bruised/small cut from fall PTA  Abdomen:scar RUQ. Dexcom blood sugar meter RLQ.  Groin: redness and blanching  Sacrum/Coccyx/Buttocks: redness and blanching  (R) Leg: redness and blanching  (L) Leg: redness and blanching  (R) Heel/Foot/Toe: redness, blanching, and scab  (L) Heel/Foot/Toe: redness and blanching, scar second toe          Devices in place: ECG, BP Cuff, and Pulse Ox    Interventions in place: Gray ear foams, NC with ear foams, Heel Mepilex, Sacral Mepilex, Pillows, Q2 turns, Low air loss mattress , and Pressure redistribution mattress    Possible skin injury found: No    Pictures uploaded into Epic: N/A  Wound Consult Placed: N/A

## 2022-10-30 NOTE — ASSESSMENT & PLAN NOTE
Due to severe sepsis and hypotension  CT scan abdomen and pelvis did not show hydronephrosis.  Continuing to treat with IV fluids  Baseline creat 0.9  11/3 BUN:19, Cr:1.17  11/2 BUN: 26, creatinine: 1.52  10/31 BUN: 48, creatinine: 1.91

## 2022-10-30 NOTE — ASSESSMENT & PLAN NOTE
Continue mirtazapine per patient preference  Restart SSRI / buspar when able to tolerate PO better

## 2022-10-30 NOTE — H&P
Hospital Medicine History & Physical Note    Date of Service  10/30/2022    Primary Care Physician  Pcp Pt States None    Consultants  critical care and GI    Specialist Names: Dr. Nagy, Dr. Gonzales    Code Status  DNAR/DNI    Chief Complaint  Chief Complaint   Patient presents with    Irregular Heart Beat     Transfer from [facility] fib RVR, hypotension, epigastric pain    Hypotension     Septic protocol ordered for [facility]       History of Presenting Illness  Asmita Willis is a 67 y.o. female with HTN, IDDM, h/o SzDO, h/o NSTEMI, chronic LBP transferred from [place] 10/30/2022 with severe sepsis, DKA, and hematemesis.    She was admitted to [facility] 6/11-6/18 for DKA, abdominal pain, and hematemesis. She underwent EGD/Colonoscopy with Dr. Jo 6/15 which showed severe esophagitis, gastritis, and duodenitis. She was prescribed PPI and followed up with GI Dr. Dos Santos in [place].     She underwent esphageal dilation 10/6/22. Since then she has had severe constant chest pain that is unaffected by position nor exertion. It is currently 9/10 and takes her breath away. She has odynophagia and reports approximately 70 lbs weight loss in the past 2 months. Due to the pain she has not been taking any of her PO medications except mirtazapine at night, including not taking a PPI. She has diarrhea daily up until 5 days ago, for which she has not had a BM. She has intermittent BRB and coffee ground hematemesis. She experienced fevers and chills for the past 3 days with increasing chest pain prompting her to go to the ED at [place]. She was taking less than her normal amounts of insulin due to not feeling well,l but did not go completely without insulin. She was so weak she fell out of the car in the  parking lot. She feels light-headed when standing. She denies constipation, melena, hematochezia, syncope.    At [place] she received zosyn for sepsis of GI origin. She was transported to [facility], during which she  became hypotensive. Lactate on arrival was 2.9 from 4 at . In the ED she was found to be in Afib with RVR which aborted after digoxin. She received 30 cc/kg LR bolus and IV vancomycin for broader coverage. Hypotension resolved with IVF and norepinephrine was not initiated. Intensivist Dr. Nagy was contacted and agreed with admission to [facility].    I discussed the plan of care with patient, bedside RN, pharmacy, and critical care, GI, and ED provider .    POC discussed with intensivist Dr. Nagy. He advised against insulin gtt for BHB <4. Agreed with admission to ICU and SSI rather than long-acting due to risk of hypoglycemia. Normotensive s/p IVF bolus, no indication for pressors.    POC discussed with GI Dr. Gonzales. Reviewed prior EGD/Colonoscopy from Dr. Jo 6/15/22 and informed of esophageal dilation 10/6/22. CT Chest shows no free air. Hgb normal. No indication for EGD at this time due to risk of perforation. He was in agreement with IV PPI.    Review of Systems  Review of Systems   Constitutional:  Positive for chills, fever, malaise/fatigue and weight loss.   HENT:  Positive for sore throat. Negative for ear pain, nosebleeds and sinus pain.    Eyes:  Negative for pain.   Respiratory:  Positive for shortness of breath. Negative for hemoptysis.    Cardiovascular:  Positive for chest pain, orthopnea and leg swelling.   Gastrointestinal:  Positive for abdominal pain, diarrhea, nausea and vomiting. Negative for blood in stool, constipation and melena.   Genitourinary:  Negative for dysuria, flank pain and hematuria.   Musculoskeletal:  Positive for falls. Negative for back pain, joint pain, myalgias and neck pain.   Skin:  Negative for rash.   Neurological:  Positive for dizziness and weakness. Negative for seizures, loss of consciousness and headaches.   Endo/Heme/Allergies:  Does not bruise/bleed easily.   Psychiatric/Behavioral:  Positive for depression. The patient is nervous/anxious.      Past Medical  "History   has a past medical history of Diabetes, Hypertension, and Seizure disorder (HCC).    Surgical History   has a past surgical history that includes pr upper gi endoscopy,diagnosis (N/A, 6/15/2022) and pr colonoscopy,diagnostic (N/A, 6/15/2022).     Family History  family history includes Diabetes in her mother and sister; Hypertension in her brother and father.   Family history reviewed with patient. There is family history that is pertinent to the chief complaint.     Social History   reports that she quit smoking about 5 years ago. Her smoking use included cigarettes. She has a 102.00 pack-year smoking history. She has never used smokeless tobacco. She reports that she does not currently use alcohol. She reports current drug use. Drug: Inhaled.    Allergies  Allergies   Allergen Reactions    Other Drug      Pt states she is allergic to \"ALL\" -javad like lidocaine       Medications  Prior to Admission Medications   Prescriptions Last Dose Informant Patient Reported? Taking?   insulin NPH (HUMULIN/NOVOLIN) 100 UNIT/ML Suspension   Yes Yes   Sig: Inject 25 Units under the skin 2 times a day.   insulin regular human (HUMULIN/NOVOLIN R)   Yes Yes   Sig: Inject 10 Units under the skin 3 times a day with meals.   mirtazapine (REMERON) 15 MG Tab   Yes Yes   Sig: Take 15 mg by mouth every evening.      Facility-Administered Medications: None       Physical Exam  Temp:  [35.7 °C (96.3 °F)] 35.7 °C (96.3 °F)  Pulse:  [] 86  Resp:  [17-33] 20  BP: ()/(50-74) 119/74  SpO2:  [88 %-100 %] 97 %  Blood Pressure : 119/74   Temperature: (!) 35.7 °C (96.3 °F)   Pulse: 86   Respiration: 20   Pulse Oximetry: 97 %       Physical Exam  Vitals and nursing note reviewed.   Constitutional:       General: She is in acute distress.      Appearance: She is obese. She is ill-appearing and diaphoretic. She is not toxic-appearing.   HENT:      Head: Normocephalic.      Nose: Nose normal.      Mouth/Throat:      Mouth: Mucous " membranes are dry.      Pharynx: Oropharyngeal exudate (Oropharyngeal thrush) and posterior oropharyngeal erythema present.   Eyes:      General: No scleral icterus.     Conjunctiva/sclera: Conjunctivae normal.   Cardiovascular:      Rate and Rhythm: Regular rhythm. Tachycardia present.      Pulses: Normal pulses.      Heart sounds: Normal heart sounds. No murmur heard.    No friction rub. No gallop.   Pulmonary:      Effort: Pulmonary effort is normal. No respiratory distress.      Breath sounds: Rhonchi (Diffuse) present. No wheezing or rales.   Abdominal:      General: Bowel sounds are normal. There is no distension.      Palpations: Abdomen is soft.      Tenderness: There is abdominal tenderness (Diffuse, exquisite at epigastrum). There is no guarding or rebound.   Genitourinary:     Comments: No rios  Musculoskeletal:      Cervical back: Neck supple.      Right lower leg: No edema.      Left lower leg: No edema.   Skin:     General: Skin is warm.   Neurological:      Mental Status: She is alert.      Comments: Appropriately conversant   Psychiatric:         Attention and Perception: Attention and perception normal.         Mood and Affect: Mood is anxious and depressed. Affect is tearful.         Speech: Speech normal.         Behavior: Behavior normal. Behavior is cooperative.         Thought Content: Thought content normal.         Cognition and Memory: Cognition and memory normal.         Judgment: Judgment normal.       Laboratory:  Recent Labs     10/30/22  0702   WBC 27.3*   RBC 5.67*   HEMOGLOBIN 13.7   HEMATOCRIT 44.0   MCV 77.6*   MCH 24.2*   MCHC 31.1*   RDW 58.1*   PLATELETCT 413   MPV 10.1     Recent Labs     10/30/22  0702   SODIUM 136   POTASSIUM 3.7   CHLORIDE 100   CO2 18*   GLUCOSE 373*   BUN 59*   CREATININE 2.29*   CALCIUM 8.4*     Recent Labs     10/30/22  0702   ALTSGPT 11   ASTSGOT 13   ALKPHOSPHAT 78   TBILIRUBIN 0.9   GLUCOSE 373*     Recent Labs     10/30/22  0702   APTT 28.8   INR  1.16*     No results for input(s): NTPROBNP in the last 72 hours.      No results for input(s): TROPONINT in the last 72 hours.    Imaging:  CT-CHEST,ABDOMEN,PELVIS WITH   Final Result   Addendum (preliminary) 1 of 1   Additional history is provided that she has esophagitis and there is    recent esophageal dilation.      There is no mediastinal air. Therefore, there is no evidence for rupture.      The esophagus does appear to have wall thickening consistent with    inflammation.      Final      1.  No inflammatory process identified within the chest, abdomen, pelvis      2.  Mild intrahepatic dilation, nonspecific      3.  Mild bilateral atelectasis and small bilateral pleural effusion      4.  Marked left ventricular hypertrophy      5.  Small kidney consistent with sequela of chronic process      6.  Splenic calcification consistent with granuloma      7.  Enlarged uterus containing an 8.3 cm mass consistent with a fibroid      DX-CHEST-PORTABLE (1 VIEW)   Final Result         1.  No acute cardiopulmonary abnormality.   2.  Prominent pericardial fat again noted.          X-Ray:  I have personally reviewed the images and compared with prior images. and My impression is: vascular congestion, cardiomegaly, no infiltrates, Left pacer pad  EKG:  I have personally reviewed the images and compared with prior images. and My impression is: Afib with RVR, Qtc 523, no STEs nor TWIs    Assessment/Plan:  Justification for Admission Status  I anticipate this patient will require at least two midnights for appropriate medical management, necessitating inpatient admission because AGMA, hyperglycemia, and severe sepsis requiring IV antibiotics pending re-evaluation in 24 and 48 h for ongoing need. Requires IV pain control due to severe esophagitis as well as IV PPI pending ability to swallow. She is functionally below baseline AEB fall prior to admission.    Patient will need a Intermediate Care (Adult and Pediatrics) bed on  MEDICAL service .  The need is secondary to IMCU utilization of insulin and frequent BMP monitoring to correct TAO / DKA / K / AGMA.    * Atrial fibrillation with rapid ventricular response (HCC)- (present on admission)  Assessment & Plan  Confirmed on EKG in ED 10/30/22  [id number] = 5, SQH for now  Metoprolol 25 q8h, titrate to HR <110 and monitor HR/BP in setting of sepsis    Diabetic ketoacidosis without coma associated with type 2 diabetes mellitus (HCC)- (present on admission)  Assessment & Plan  Mild  Hyperglycemic, BHB positive, AGMA  Does not meet criteria for insulin gtt, SSI q6h without glargine per intensivist and pharmacist recommendations  BMP q4h with K replacement  Admitting to IMCU    Severe sepsis with acute organ dysfunction (HCC)- (present on admission)  Assessment & Plan  This is Severe Sepsis Present on admission  SIRS criteria identified on my evaluation include: Tachycardia, with heart rate greater than 90 BPM, Tachypnea, with respirations greater than 20 per minute and Leukocytosis, with WBC greater than 12,000  Clinical indicators of end organ dysfunction include Systolic blood pressure (SBP) <90 mmHg or mean arterial pressure <65 mmHg, Acute respiratory failure as evidenced by a new need for invasive or non-invasive mechanical ventilation (Ventilator or BiPap) , Creatinine >2.0 (Without ESRD or CKD) and Lactate >2 mmol/L (18.0 mg/dL)  Source is Likely Gastrointestinal / Esophageal  Sepsis protocol initiated  Crystalloid Fluid Administration: Fluid resuscitation ordered per standard protocol - 30 mL/kg per current or ideal body weight  IV antibiotics as appropriate for source of sepsis  Reassessment: I have reassessed the patient's hemodynamic status    CT CAP without apparent inflammatory etiology  Likely due to esophagitis  Reports no diarrhea in preceding 5 days, if 3+ BMs daily will order Cdiff  Empiric vancomycin & zosyn at [facility] ED  MRSA nares to de-escalate vancomycin if  negative  Procalcitonin ordered on admission & 48h    Extremities warm and well-perfused, no encephalopathy, BP normalized after IVF - no indication for pressors    Fall- (present on admission)  Assessment & Plan  Due to severe sepsis and weakness  PT / OT eval    Esophageal thrush (HCC)- (present on admission)  Assessment & Plan  Visible oral thrush with reported odynophagia  Likely due to esophagitis and altered mucosal surface  Nystatin swish & swallow    Esophagitis- (present on admission)  Assessment & Plan  EGD 6/14 with Dr. Jo demonstrated severe Grade D esophagitis  Follows with Dr. Dos Santos, s/p dilation 10/6, has not been taking PPI  IV PPI, transition to PO BID when stable  Red flags of weight loss, odynophagia: GI consult - unlikely to need acute intervention  SLP eval for odynophagia    High anion gap metabolic acidosis- (present on admission)  Assessment & Plan  Due to lactic acidosis and mild DKA  30 cc/kg per sepsis protocol  Repeat lactic acid  Repeat BMP q4h with SSI for mild DKA      Acute respiratory failure with hypoxemia (HCC)- (present on admission)  Assessment & Plan  Likely due to sepsis  CT chest negative for infiltrates, GGOs, only mild effusions  TTE June 2022 LVEF 70%  Likely HFpEF, conservative IVF  DDimer ordered  Zosyn empirically for sepsis from GI source    Hematemesis- (present on admission)  Assessment & Plan  Likely due to gastritis and esophagitis seen on EGD 6/15  Hgb WNL  Repeat AM CBC    Chest pain- (present on admission)  Assessment & Plan  The ASCVD Risk score (Morovis DK, et al., 2019) failed to calculate for the following reasons:    The patient has a prior MI or stroke diagnosis    Likely due to esophagitis  Troponins negative at [place], negative stress test June 2012  EKG without ischemic signs  Telemetry  GI cocktail contraindicated due to lidocaine allergy    TAO (acute kidney injury) (HCC)- (present on admission)  Assessment & Plan  Due to severe sepsis and  hypotension  UA to screen for casts / ATN  Repeat BMP    Chronic bilateral low back pain without sciatica- (present on admission)  Assessment & Plan  Likely due to obesity  APAP PRN  NSAIDs contraindicated due to TAO and gastritis  Pain protocol - oxycodone and IV dilaudid PRN  Gabapentin deferred per patient protocol due to odynophagia    Anxiety and depression- (present on admission)  Assessment & Plan  Continue mirtazapine per patient preference  Restart SSRI / buspar when able to tolerate PO better    Primary hypertension- (present on admission)  Assessment & Plan  Holding amlodipine and lisinopril in setting of sepsis      VTE prophylaxis: heparin ppx

## 2022-10-30 NOTE — ASSESSMENT & PLAN NOTE
Mild  Hyperglycemic, BHB positive, AGMA  Does not meet criteria for insulin gtt, SSI q6h without glargine per intensivist and pharmacist recommendations  Now resolved.  BG's have been mid 100s  Follow AG/CO2

## 2022-10-30 NOTE — ASSESSMENT & PLAN NOTE
Confirmed on EKG in ED 10/30/22  [id number] = 5, SQH for now  Metoprolol 25 q8h, titrate to HR <110 and monitor HR/BP in setting of sepsis  Heparin SC for now given esophagitis, significant hemetemesis per patient.  Monitor on telemetry

## 2022-10-30 NOTE — ASSESSMENT & PLAN NOTE
Likely due to sepsis  CT chest negative for infiltrates, only mild effusions  TTE June 2022 LVEF 70% with LV hypertrophy noted  Likely HFpEF  DDimer ordered was elevated but it could be inflammatory process.  Oxygen saturation has been improving.  Wean oxygen as tolerates to keep SPO2 greater than 88%  If her clinical condition get worse she may need CTA pulmonary.  I am holding to avoid contrast-induced nephropathy in the setting of TAO.  DVT studies pending  TAO make CTA problematic we will reconsider if renal function improves enough.

## 2022-10-30 NOTE — ASSESSMENT & PLAN NOTE
Likely due to gastritis and esophagitis seen on EGD 6/15  Hemoglobin remained stable.  IV PPI  sucralfate

## 2022-10-30 NOTE — PROGRESS NOTES
Report received from Zaynab AGUILERA. Patient arrived to T604 at 0930 with this RN and Domo CCT. Patient transferred to ICU bed, then up to bedside commode for BM and void. 2 RN skin check completed with this RN and Kd AGUILERA, see separate note.

## 2022-10-31 ENCOUNTER — APPOINTMENT (OUTPATIENT)
Dept: RADIOLOGY | Facility: MEDICAL CENTER | Age: 67
DRG: 871 | End: 2022-10-31
Attending: INTERNAL MEDICINE
Payer: MEDICARE

## 2022-10-31 LAB
ANION GAP SERPL CALC-SCNC: 10 MMOL/L (ref 7–16)
ANION GAP SERPL CALC-SCNC: 12 MMOL/L (ref 7–16)
ANISOCYTOSIS BLD QL SMEAR: ABNORMAL
BASOPHILS # BLD AUTO: 0 % (ref 0–1.8)
BASOPHILS # BLD: 0 K/UL (ref 0–0.12)
BUN SERPL-MCNC: 48 MG/DL (ref 8–22)
BUN SERPL-MCNC: 54 MG/DL (ref 8–22)
BURR CELLS BLD QL SMEAR: NORMAL
CA-I SERPL-SCNC: 1.1 MMOL/L (ref 1.1–1.3)
CALCIUM SERPL-MCNC: 7.9 MG/DL (ref 8.5–10.5)
CALCIUM SERPL-MCNC: 8.3 MG/DL (ref 8.5–10.5)
CHLORIDE SERPL-SCNC: 101 MMOL/L (ref 96–112)
CHLORIDE SERPL-SCNC: 101 MMOL/L (ref 96–112)
CO2 SERPL-SCNC: 20 MMOL/L (ref 20–33)
CO2 SERPL-SCNC: 21 MMOL/L (ref 20–33)
CREAT SERPL-MCNC: 1.91 MG/DL (ref 0.5–1.4)
CREAT SERPL-MCNC: 2.07 MG/DL (ref 0.5–1.4)
EOSINOPHIL # BLD AUTO: 0 K/UL (ref 0–0.51)
EOSINOPHIL NFR BLD: 0 % (ref 0–6.9)
ERYTHROCYTE [DISTWIDTH] IN BLOOD BY AUTOMATED COUNT: 62.1 FL (ref 35.9–50)
GFR SERPLBLD CREATININE-BSD FMLA CKD-EPI: 26 ML/MIN/1.73 M 2
GFR SERPLBLD CREATININE-BSD FMLA CKD-EPI: 28 ML/MIN/1.73 M 2
GLUCOSE BLD STRIP.AUTO-MCNC: 102 MG/DL (ref 65–99)
GLUCOSE BLD STRIP.AUTO-MCNC: 189 MG/DL (ref 65–99)
GLUCOSE BLD STRIP.AUTO-MCNC: 68 MG/DL (ref 65–99)
GLUCOSE BLD STRIP.AUTO-MCNC: 81 MG/DL (ref 65–99)
GLUCOSE BLD STRIP.AUTO-MCNC: 88 MG/DL (ref 65–99)
GLUCOSE SERPL-MCNC: 76 MG/DL (ref 65–99)
GLUCOSE SERPL-MCNC: 77 MG/DL (ref 65–99)
HCT VFR BLD AUTO: 34.2 % (ref 37–47)
HCT VFR BLD AUTO: 36.1 % (ref 37–47)
HGB BLD-MCNC: 10.5 G/DL (ref 12–16)
HGB BLD-MCNC: 10.7 G/DL (ref 12–16)
LACTATE SERPL-SCNC: 2.2 MMOL/L (ref 0.5–2)
LACTATE SERPL-SCNC: 2.9 MMOL/L (ref 0.5–2)
LYMPHOCYTES # BLD AUTO: 6.61 K/UL (ref 1–4.8)
LYMPHOCYTES NFR BLD: 28.5 % (ref 22–41)
MANUAL DIFF BLD: NORMAL
MCH RBC QN AUTO: 24.2 PG (ref 27–33)
MCHC RBC AUTO-ENTMCNC: 29.6 G/DL (ref 33.6–35)
MCV RBC AUTO: 81.7 FL (ref 81.4–97.8)
MICROCYTES BLD QL SMEAR: ABNORMAL
MONOCYTES # BLD AUTO: 1.39 K/UL (ref 0–0.85)
MONOCYTES NFR BLD AUTO: 6 % (ref 0–13.4)
MORPHOLOGY BLD-IMP: NORMAL
NEUTROPHILS # BLD AUTO: 15.2 K/UL (ref 2–7.15)
NEUTROPHILS NFR BLD: 65.5 % (ref 44–72)
NRBC # BLD AUTO: 0 K/UL
NRBC BLD-RTO: 0 /100 WBC
OVALOCYTES BLD QL SMEAR: NORMAL
PLATELET # BLD AUTO: 274 K/UL (ref 164–446)
PLATELET BLD QL SMEAR: NORMAL
PMV BLD AUTO: 9.8 FL (ref 9–12.9)
POIKILOCYTOSIS BLD QL SMEAR: NORMAL
POTASSIUM SERPL-SCNC: 4.5 MMOL/L (ref 3.6–5.5)
POTASSIUM SERPL-SCNC: 4.8 MMOL/L (ref 3.6–5.5)
RBC # BLD AUTO: 4.42 M/UL (ref 4.2–5.4)
RBC BLD AUTO: PRESENT
SODIUM SERPL-SCNC: 132 MMOL/L (ref 135–145)
SODIUM SERPL-SCNC: 133 MMOL/L (ref 135–145)
WBC # BLD AUTO: 23.2 K/UL (ref 4.8–10.8)

## 2022-10-31 PROCEDURE — 770020 HCHG ROOM/CARE - TELE (206)

## 2022-10-31 PROCEDURE — 99222 1ST HOSP IP/OBS MODERATE 55: CPT | Mod: GC | Performed by: PSYCHIATRY & NEUROLOGY

## 2022-10-31 PROCEDURE — 97166 OT EVAL MOD COMPLEX 45 MIN: CPT

## 2022-10-31 PROCEDURE — 82330 ASSAY OF CALCIUM: CPT

## 2022-10-31 PROCEDURE — 85007 BL SMEAR W/DIFF WBC COUNT: CPT

## 2022-10-31 PROCEDURE — 85018 HEMOGLOBIN: CPT

## 2022-10-31 PROCEDURE — 85014 HEMATOCRIT: CPT

## 2022-10-31 PROCEDURE — 700105 HCHG RX REV CODE 258: Performed by: STUDENT IN AN ORGANIZED HEALTH CARE EDUCATION/TRAINING PROGRAM

## 2022-10-31 PROCEDURE — A9270 NON-COVERED ITEM OR SERVICE: HCPCS | Performed by: STUDENT IN AN ORGANIZED HEALTH CARE EDUCATION/TRAINING PROGRAM

## 2022-10-31 PROCEDURE — C9113 INJ PANTOPRAZOLE SODIUM, VIA: HCPCS | Performed by: STUDENT IN AN ORGANIZED HEALTH CARE EDUCATION/TRAINING PROGRAM

## 2022-10-31 PROCEDURE — 700102 HCHG RX REV CODE 250 W/ 637 OVERRIDE(OP): Performed by: STUDENT IN AN ORGANIZED HEALTH CARE EDUCATION/TRAINING PROGRAM

## 2022-10-31 PROCEDURE — 700111 HCHG RX REV CODE 636 W/ 250 OVERRIDE (IP): Performed by: STUDENT IN AN ORGANIZED HEALTH CARE EDUCATION/TRAINING PROGRAM

## 2022-10-31 PROCEDURE — 99152 MOD SED SAME PHYS/QHP 5/>YRS: CPT

## 2022-10-31 PROCEDURE — 85025 COMPLETE CBC W/AUTO DIFF WBC: CPT

## 2022-10-31 PROCEDURE — 80048 BASIC METABOLIC PNL TOTAL CA: CPT

## 2022-10-31 PROCEDURE — 97162 PT EVAL MOD COMPLEX 30 MIN: CPT

## 2022-10-31 PROCEDURE — 99233 SBSQ HOSP IP/OBS HIGH 50: CPT | Performed by: INTERNAL MEDICINE

## 2022-10-31 PROCEDURE — 82962 GLUCOSE BLOOD TEST: CPT

## 2022-10-31 PROCEDURE — 31500 INSERT EMERGENCY AIRWAY: CPT

## 2022-10-31 PROCEDURE — 83605 ASSAY OF LACTIC ACID: CPT

## 2022-10-31 RX ORDER — SODIUM CHLORIDE, SODIUM LACTATE, POTASSIUM CHLORIDE, AND CALCIUM CHLORIDE .6; .31; .03; .02 G/100ML; G/100ML; G/100ML; G/100ML
1000 INJECTION, SOLUTION INTRAVENOUS ONCE
Status: COMPLETED | OUTPATIENT
Start: 2022-10-31 | End: 2022-10-31

## 2022-10-31 RX ORDER — DIPHENHYDRAMINE HCL 25 MG
25 TABLET ORAL EVERY 6 HOURS PRN
Status: DISCONTINUED | OUTPATIENT
Start: 2022-10-31 | End: 2022-11-04 | Stop reason: HOSPADM

## 2022-10-31 RX ADMIN — METOPROLOL TARTRATE 25 MG: 25 TABLET, FILM COATED ORAL at 21:46

## 2022-10-31 RX ADMIN — NYSTATIN 500000 UNITS: 100000 SUSPENSION ORAL at 09:04

## 2022-10-31 RX ADMIN — NYSTATIN 500000 UNITS: 100000 SUSPENSION ORAL at 13:00

## 2022-10-31 RX ADMIN — PIPERACILLIN AND TAZOBACTAM 3.38 G: 3; .375 INJECTION, POWDER, LYOPHILIZED, FOR SOLUTION INTRAVENOUS; PARENTERAL at 21:46

## 2022-10-31 RX ADMIN — PIPERACILLIN AND TAZOBACTAM 3.38 G: 3; .375 INJECTION, POWDER, LYOPHILIZED, FOR SOLUTION INTRAVENOUS; PARENTERAL at 04:42

## 2022-10-31 RX ADMIN — MIRTAZAPINE 15 MG: 15 TABLET, FILM COATED ORAL at 21:46

## 2022-10-31 RX ADMIN — HEPARIN SODIUM 5000 UNITS: 5000 INJECTION, SOLUTION INTRAVENOUS; SUBCUTANEOUS at 21:46

## 2022-10-31 RX ADMIN — OXYCODONE HYDROCHLORIDE 10 MG: 10 TABLET ORAL at 15:07

## 2022-10-31 RX ADMIN — NYSTATIN 500000 UNITS: 100000 SUSPENSION ORAL at 21:46

## 2022-10-31 RX ADMIN — PANTOPRAZOLE SODIUM 40 MG: 40 INJECTION, POWDER, LYOPHILIZED, FOR SOLUTION INTRAVENOUS at 18:22

## 2022-10-31 RX ADMIN — OXYCODONE HYDROCHLORIDE 10 MG: 10 TABLET ORAL at 01:29

## 2022-10-31 RX ADMIN — HEPARIN SODIUM 5000 UNITS: 5000 INJECTION, SOLUTION INTRAVENOUS; SUBCUTANEOUS at 05:42

## 2022-10-31 RX ADMIN — INSULIN HUMAN 3 UNITS: 100 INJECTION, SOLUTION PARENTERAL at 18:18

## 2022-10-31 RX ADMIN — OXYCODONE HYDROCHLORIDE 10 MG: 10 TABLET ORAL at 21:46

## 2022-10-31 RX ADMIN — PANTOPRAZOLE SODIUM 40 MG: 40 INJECTION, POWDER, LYOPHILIZED, FOR SOLUTION INTRAVENOUS at 05:42

## 2022-10-31 RX ADMIN — OXYCODONE HYDROCHLORIDE 10 MG: 10 TABLET ORAL at 18:37

## 2022-10-31 RX ADMIN — SODIUM CHLORIDE, POTASSIUM CHLORIDE, SODIUM LACTATE AND CALCIUM CHLORIDE 1000 ML: 600; 310; 30; 20 INJECTION, SOLUTION INTRAVENOUS at 03:35

## 2022-10-31 RX ADMIN — HEPARIN SODIUM 5000 UNITS: 5000 INJECTION, SOLUTION INTRAVENOUS; SUBCUTANEOUS at 15:56

## 2022-10-31 RX ADMIN — OXYCODONE HYDROCHLORIDE 10 MG: 10 TABLET ORAL at 05:43

## 2022-10-31 RX ADMIN — OXYCODONE HYDROCHLORIDE 10 MG: 10 TABLET ORAL at 11:04

## 2022-10-31 RX ADMIN — METOPROLOL TARTRATE 25 MG: 25 TABLET, FILM COATED ORAL at 06:16

## 2022-10-31 RX ADMIN — METOPROLOL TARTRATE 25 MG: 25 TABLET, FILM COATED ORAL at 15:55

## 2022-10-31 RX ADMIN — NYSTATIN 500000 UNITS: 100000 SUSPENSION ORAL at 15:57

## 2022-10-31 RX ADMIN — PIPERACILLIN AND TAZOBACTAM 3.38 G: 3; .375 INJECTION, POWDER, LYOPHILIZED, FOR SOLUTION INTRAVENOUS; PARENTERAL at 15:14

## 2022-10-31 ASSESSMENT — ENCOUNTER SYMPTOMS
POLYDIPSIA: 0
SHORTNESS OF BREATH: 0
VOMITING: 0
NAUSEA: 1
FOCAL WEAKNESS: 0
CHILLS: 0
DOUBLE VISION: 0
WEIGHT LOSS: 1
SPUTUM PRODUCTION: 0
SENSORY CHANGE: 0
NAUSEA: 0
EYE PAIN: 0
TREMORS: 0
NERVOUS/ANXIOUS: 1
PHOTOPHOBIA: 0
COUGH: 0
BACK PAIN: 0
WEAKNESS: 1
ABDOMINAL PAIN: 0
DIZZINESS: 0
HEADACHES: 0
CONSTIPATION: 0
HEARTBURN: 0
FEVER: 0
DEPRESSION: 1
BLURRED VISION: 0
BACK PAIN: 1
NECK PAIN: 0
ORTHOPNEA: 0
TINGLING: 0
HALLUCINATIONS: 0
WEIGHT LOSS: 0
ABDOMINAL PAIN: 1
INSOMNIA: 1
DIARRHEA: 0
SORE THROAT: 0
MYALGIAS: 0
PALPITATIONS: 0
SPEECH CHANGE: 0

## 2022-10-31 ASSESSMENT — COGNITIVE AND FUNCTIONAL STATUS - GENERAL
MOBILITY SCORE: 23
TOILETING: A LITTLE
DAILY ACTIVITIY SCORE: 17
HELP NEEDED FOR BATHING: A LOT
SUGGESTED CMS G CODE MODIFIER DAILY ACTIVITY: CK
PERSONAL GROOMING: A LITTLE
DRESSING REGULAR UPPER BODY CLOTHING: A LITTLE
SUGGESTED CMS G CODE MODIFIER MOBILITY: CI
CLIMB 3 TO 5 STEPS WITH RAILING: A LITTLE
DRESSING REGULAR LOWER BODY CLOTHING: A LOT

## 2022-10-31 ASSESSMENT — GAIT ASSESSMENTS
ASSISTIVE DEVICE: FRONT WHEEL WALKER
GAIT LEVEL OF ASSIST: STANDBY ASSIST
DISTANCE (FEET): 20

## 2022-10-31 ASSESSMENT — LIFESTYLE VARIABLES
SUBSTANCE_ABUSE: 0
SUBSTANCE_ABUSE: 1

## 2022-10-31 ASSESSMENT — PAIN DESCRIPTION - PAIN TYPE
TYPE: ACUTE PAIN

## 2022-10-31 ASSESSMENT — ACTIVITIES OF DAILY LIVING (ADL): TOILETING: REQUIRES ASSIST

## 2022-10-31 ASSESSMENT — FIBROSIS 4 INDEX: FIB4 SCORE: 0.63

## 2022-10-31 NOTE — PROGRESS NOTES
I met with Mrs. Willis at bedside after being notified by primary RN Guillaume that she screened positive for suicide risk and warranted a 1:1 sitter per protocol.    I performed the [place] Suicide Severity Rating Scale Screener    In the past month- No reported suicidal ideation  Most severe in lifetime - No reported suicidal behavior    Score = 0, low risk of suicide.     Mental health hold and behavioral health provider consultation not required.    She expressed hope for recovery and appreciation for her medical care at [facility]. She denied passive SI, history of suicidal attempts.    I acknowledged her chronic pain, fatigue, and depression as poor QOL and provided reassurance that we will do our best to get her feeling better.    Discussed encounter with IMCU Charge WILLY Barakat and primary RN Guillaume. Sitter discontinued.

## 2022-10-31 NOTE — PROGRESS NOTES
Spiritual Care Note    Patient Information     Patient's Name: Asmita Willis   MRN: [id number]    YOB: 1955   Age and Gender: 67 y.o. female   Service Area:    Room (and Bed): [place] [address]   Ethnicity or Nationality:     Primary Language:    Temple/Spiritual preference: Muslim   Place of Residence: [place]   Family/Friends/Others Present:    Clinical Team Present:    Medical Diagnosis(-es)/Procedure(s):    Code Status: DNAR/DNI    Date of Admission: 10/30/2022   Length of Stay: 1 days        Spiritual Care Provider Information:  Name of Spiritual Care Provider: Aidan Piedra  Title of Spiritual Care Provider: Manager  Phone Number: [phone]  E-mail: [email or web address]    minutes    Spiritual Screen Results:    Gen Nursing  Spiritual Screen  Was spiritual care education provided to the patient?: Yes     Palliative Care  PC Temple/Spiritual Screening  Was spiritual care education provided to the patient?: Yes      Encounter/Request Information  Encounter/Request Type   Visited With: Patient  Nature of the Visit: Initial, On shift  General Visit: Yes  Referral From/ Origin of Request: Epic nursing  Referral To: Other /SCP    Religous Needs/Values  Temple Needs Visit  Temple Needs: Prayer    Spiritual Assessment        Notes:  Referred pt to Volunteer  Deyanira Martins who visited this Yazidism/Muslim pt to provide comfort and support.  prayed with the pt on 10/31/22.

## 2022-10-31 NOTE — PROGRESS NOTES
Critical HGB/HCT this AM. 10.7/36.1 decreased from 10/30 HGB/HCT of 13.7/44.0. Pt w/o hematemesis or other signs of bleeding. Soft BP's 's systolic. Electrolytes low, sodium 132, Calcium 7.2, and lactic elevated 2.2.  Notified Dr. Le who ordered for 1 LR bolus to be given. Per Dr. Le, Hgb decline likely dilutional, continue to monitor.

## 2022-10-31 NOTE — CARE PLAN
The patient is Stable - Low risk of patient condition declining or worsening       Progress made toward(s) clinical / shift goals:     Patient is not progressing towards the following goals:      Problem: Knowledge Deficit - Standard  Goal: Patient and family/care givers will demonstrate understanding of plan of care, disease process/condition, diagnostic tests and medications  Outcome: Progressing     Problem: Hemodynamics  Goal: Patient's hemodynamics, fluid balance and neurologic status will be stable or improve  Outcome: Progressing     Problem: Fluid Volume  Goal: Fluid volume balance will be maintained  Outcome: Progressing     Problem: Urinary - Renal Perfusion  Goal: Ability to achieve and maintain adequate renal perfusion and functioning will improve  Outcome: Progressing     Problem: Respiratory  Goal: Patient will achieve/maintain optimum respiratory ventilation and gas exchange  Outcome: Progressing     Problem: Pain - Standard  Goal: Alleviation of pain or a reduction in pain to the patient’s comfort goal  Outcome: Progressing     Problem: Skin Integrity  Goal: Skin integrity is maintained or improved  Outcome: Progressing     Problem: Fall Risk  Goal: Patient will remain free from falls  Outcome: Progressing     Problem: Depression  Goal: Patient and family/caregiver will verbalize accurate information about at least two of the possible causes of depression, three-four of the signs and symptoms of depression  Outcome: Progressing     Problem: Provide Safe Environment  Goal: Suicide environmental safety, protocols, policies, and practices will be implemented  Outcome: Progressing     Problem: Psychosocial  Goal: Patient's ability to identify and develop effective coping behaviors will improve  Outcome: Progressing  Goal: Patient's ability to identify and utilize available support systems will improve  Outcome: Progressing

## 2022-10-31 NOTE — DISCHARGE PLANNING
Case Management Discharge Planning    Admission Date: 10/30/2022  GMLOS: 5  ALOS: 1    6-Clicks ADL Score:    6-Clicks Mobility Score:    PT and/or OT Eval ordered: Yes  Post-acute Referrals Ordered: No  Post-acute Choice Obtained: No  Has referral(s) been sent to post-acute provider:  No      Anticipated Discharge Dispo: Discharge Disposition: Discharged to home/self care (01)    DME Needed: No    Action(s) Taken: DC Assessment Complete (See below) CM met with patient at bedside, patient verbalizes she lives with her daughter and son in law in [place]. Per patient she uses a cane in the house to get around the home, she uses a walker when she goes outside to sit on the porch and a wheelchair if going any further then that. Patient verbalizes she does tire easily. Patient uses oxygen at night mostly per patient she has portable and concentrator at home.  Patient verbalizes they only have 3 steps to enter the house and it is a single story. Patient daughter will be helping patient when she gets home.     Escalations Completed: None    Medically Clear: No    Next Steps: speech has seen patient recommending PAC, pt/ot will see patient today. Follow for discharge needs.     Barriers to Discharge: None    Is the patient up for discharge tomorrow: No

## 2022-10-31 NOTE — THERAPY
"Occupational Therapy   Initial Evaluation     Patient Name: Asmita Willis  Age:  67 y.o., Sex:  female  Medical Record #: [id number]  Today's Date: 10/31/2022     Precautions: Fall Risk, Spinal / Back Precautions     Assessment    Patient is 67 y.o. female admitted with afib RVR, hypotension, and DKA and severe sepsis, pmhx includes HTN, IDDM, SzDO, h/o NSTEMI, chronic LBP. Pt presents to OT eval requiring 6L O2 but otherwise able to complete functional transfers and ADLs at/near her baseline of functional independence. Pt reports supportive family who assists with all ADLs including dressing, bathing and toileting at baseline. Pt has adequate support for DC home once medically cleared from OT perspective.     Plan    Recommend Occupational Therapy for Evaluation only Patient will not be actively followed for occupational therapy services at this time, however may be seen if requested by physician for 1 more visit within 30 days to address any discharge or equipment needs.     DC Equipment Recommendations: None  Discharge Recommendations: Recommend home health for continued occupational therapy services     Subjective    \"My daughter is so wonderful, she helps me with anything.\"     Objective       10/31/22 1151   Prior Living Situation   Prior Services Intermittent Physical Support for ADL Per Family   Housing / Facility 1 Story House   Steps Into Home 3   Bathroom Set up Walk In Shower;Shower Chair;Grab Bars   Equipment Owned 4-Wheel Walker;Tub / Shower Seat;Grab Bar(s) By Toilet;Grab Bar(s) In Tub / Shower   Lives with - Patient's Self Care Capacity Spouse;Adult Children   Comments daughter is very helpful, always home   Prior Level of ADL Function   Self Feeding Independent   Grooming / Hygiene Requires Assist   Bathing Requires Assist   Dressing Requires Assist   Toileting Requires Assist   Comments daughter helps as needed   Prior Level of IADL Function   Comments family manages IADLs   Precautions "   Precautions Fall Risk;Spinal / Back Precautions    Pain 0 - 10 Group   Therapist Pain Assessment Nurse Notified;During Activity   Cognition    Level of Consciousness Alert   Balance Assessment   Sitting Balance (Static) Fair   Sitting Balance (Dynamic) Fair   Standing Balance (Static) Fair   Standing Balance (Dynamic) Fair   Weight Shift Sitting Good   Weight Shift Standing Good   Comments w/FWW   Bed Mobility    Supine to Sit Supervised   Sit to Supine Supervised   Scooting Supervised   Rolling Supervised   ADL Assessment   Eating Modified Independent   Grooming Minimal Assist;Seated   Upper Body Dressing Minimal Assist   Lower Body Dressing Moderate Assist   Toileting Minimal Assist   Comments family assists at baseline   How much help from another person does the patient currently need...   Putting on and taking off regular lower body clothing? 2   Bathing (including washing, rinsing, and drying)? 2   Toileting, which includes using a toilet, bedpan, or urinal? 3   Putting on and taking off regular upper body clothing? 3   Taking care of personal grooming such as brushing teeth? 3   Eating meals? 4   6 Clicks Daily Activity Score 17   Functional Mobility   Sit to Stand Supervised   Bed, Chair, Wheelchair Transfer Supervised   Transfer Method Stand Pivot;Stand Step   Mobility w/FWW   Activity Tolerance   Comments at/near her baseline, new supplemental O2 requirement   Education Group   Education Provided Home Safety;Role of Occupational Therapist;Activities of Daily Living   Role of Occupational Therapist Patient Response Patient;Acceptance;Explanation;Verbal Demonstration   Home Safety Patient Response Patient;Acceptance;Explanation;Verbal Demonstration   ADL Patient Response Patient;Acceptance;Explanation;Verbal Demonstration   Problem List   Problem List None  (pt at her functional indepedence baseline, family assists as needed)   Anticipated Discharge Equipment and Recommendations   DC Equipment  Recommendations None   Discharge Recommendations Recommend home health for continued occupational therapy services
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
## 2022-10-31 NOTE — THERAPY
Physical Therapy   Initial Evaluation     Patient Name: Asmita Willis  Age:  67 y.o., Sex:  female  Medical Record #: [id number]  Today's Date: 10/31/2022     Precautions  Precautions: Fall Risk;Swallow Precautions ( See Comments)    Assessment  Patient is 67 y.o. female with h/o HTN, IDDM, h/o SzDO, h/o NSTEMI, chronic LBP transferred from [facility] 10/30/2022 with severe sepsis, DKA, and hematemesis. She underwent esphageal dilation 10/6/22. Since then she has had severe constant chest pain that is unaffected by position nor exertion.  She has odynophagia and reports approximately 70 lbs weight loss in the past 2 months. She was so weak she fell out of the car in the  parking lot.  Additional factors influencing patient status / progress : today, pt's biggest limitation is her quick desaturation when up to ambulate, dropping to 86% SpO2 within 20 feet of ambulation on 6L O2. Pt reports having great help at home from her adult daughter who does all cooking, laundry and helps pt with her daily routine as needed. Pt reports a few recent falls from her right knee buckling unpredictably when using her cane at home. Pt was educated that she may need to switch to using her 4WW at all times to avoid fallling when the knee ade. Pt's LEs show 5/5 strength at EOB today. PT to follow to trial longer ambulation and stairs as pt has 3 to enter home.       Plan    Recommend Physical Therapy 3 times per week until therapy goals are met for the following treatments:  Bed Mobility, Gait Training, Stair Training, and Therapeutic Activities    DC Equipment Recommendations: None  Discharge Recommendations: Recommend home health for continued physical therapy services          Objective       10/31/22 1150   Charge Group   PT Evaluation PT Evaluation Mod   Total Time Spent   PT Total Time Yes   PT Evaluation Time Spent (Mins) 30   PT Total Time Spent (Calculated) 30   Initial Contact Note    Initial Contact Note Order Received  "and Verified, Physical Therapy Evaluation in Progress with Full Report to Follow.   Precautions   Precautions Fall Risk;Swallow Precautions ( See Comments)   Vitals   Pulse Oximetry (!) 86 %  (during ambulation on 6L nc)   O2 (LPM) 6   O2 Delivery Device Nasal Cannula   Pain 0 - 10 Group   Therapist Pain Assessment During Activity;Nurse Notified  (LBP, throat, abdominal pain, not rated.)   Prior Living Situation   Prior Services Intermittent Physical Support for ADL Per Family   Housing / Facility 1 Story House   Steps Into Home 3   Steps In Home 0   Rail Both Rail (Steps into Home)   Equipment Owned 4-Wheel Walker;Single Point Cane;Wheelchair   Lives with - Patient's Self Care Capacity Spouse;Adult Children  (Spouse, daughter, SHELLY, grandson)   Comments Always someone home who can help.   Prior Level of Functional Mobility   Bed Mobility Required Assist   Transfer Status Independent   Ambulation Independent   Distance Ambulation (Feet)   (mostly household, used  for community)   Assistive Devices Used 4-Wheel Walker  (varies with this. Reports using SPC in home, then says uses 4WW \"when outside\", then says uses 4WW inside at times.)   Stairs Required Assist   Comments daughter does all food prep, laundry, assists pt with OOB, shower, getting dressed.   History of Falls   History of Falls Yes   Date of Last Fall   (part of admission, and a few other recent falls from R knee buckling.)   Cognition    Level of Consciousness Alert   Active ROM Lower Body    Active ROM Lower Body  WDL   Strength Lower Body   Lower Body Strength  WDL   Comments MMT on R knee shows adequate strength, 5/5, but pt reports h/o unpredictable buckling.   Lower Body Muscle Tone   Lower Body Muscle Tone  WDL   Balance Assessment   Sitting Balance (Static) Fair   Sitting Balance (Dynamic) Fair   Standing Balance (Static) Fair   Standing Balance (Dynamic) Fair   Weight Shift Sitting Good   Weight Shift Standing Good   Comments with FWW   Gait " Analysis   Gait Level Of Assist Standby Assist   Assistive Device Front Wheel Walker   Distance (Feet) 20   # of Times Distance was Traveled 1   # of Stairs Climbed 0   Weight Bearing Status no restrictions   Bed Mobility    Supine to Sit Supervised   Sit to Supine Supervised   Scooting Supervised   Rolling Supervised   Functional Mobility   Sit to Stand Supervised   Transfer Method Stand Pivot   How much difficulty does the patient currently have...   Turning over in bed (including adjusting bedclothes, sheets and blankets)? 4   Sitting down on and standing up from a chair with arms (e.g., wheelchair, bedside commode, etc.) 4   Moving from lying on back to sitting on the side of the bed? 4   How much help from another person does the patient currently need...   Moving to and from a bed to a chair (including a wheelchair)? 4   Need to walk in a hospital room? 4   Climbing 3-5 steps with a railing? 3   6 clicks Mobility Score 23   Activity Tolerance   Standing 5   Short Term Goals    Short Term Goal # 1 Pt will ambulate x100 feet using FWW with supervision in 6 visits to improve functional indep.   Short Term Goal # 2 Pt will negotiate 3 stairs with supervision in 6 visits to access home.   Education Group   Education Provided Role of Physical Therapist   Role of Physical Therapist Patient Response Patient;Acceptance;Explanation;Verbal Demonstration   Problem List    Problems Decreased Activity Tolerance   Anticipated Discharge Equipment and Recommendations   DC Equipment Recommendations None   Discharge Recommendations Recommend home health for continued physical therapy services   Interdisciplinary Plan of Care Collaboration   IDT Collaboration with  Nursing   Patient Position at End of Therapy In Bed;Call Light within Reach;Tray Table within Reach;Phone within Reach   Collaboration Comments sarah updated   Session Information   Date / Session Number  10/31-1 (1/3, 11/6)

## 2022-10-31 NOTE — PROGRESS NOTES
[facility] Medicine Daily Progress Note    Date of Service  10/31/2022    Chief Complaint  Asmita Willis is a 67 y.o. female admitted 10/30/2022 with severe sepsis, DKA and hematemesis    Hospital Course    Asmita Willis is a 67 y.o. female with HTN, IDDM, h/o SzDO, h/o NSTEMI, chronic LBP transferred from [facility] 10/30/2022 with severe sepsis, DKA, and hematemesis.     She was admitted to [facility] 6/11-6/18 for DKA, abdominal pain, and hematemesis. She underwent EGD/Colonoscopy with Dr. Jo 6/15 which showed severe esophagitis, gastritis, and duodenitis. She was prescribed PPI and followed up with GI Dr. Dos Santos in [place].      She underwent esphageal dilation 10/6/22. Since then she has had severe constant chest pain that is unaffected by position nor exertion. It is currently 9/10 and takes her breath away. She has odynophagia and reports approximately 70 lbs weight loss in the past 2 months. Due to the pain she has not been taking any of her PO medications except mirtazapine at night, including not taking a PPI. She has diarrhea daily up until 5 days ago, for which she has not had a BM. She has intermittent BRB and coffee ground hematemesis. She experienced fevers and chills for the past 3 days with increasing chest pain prompting her to go to the ED at [facility]. She was taking less than her normal amounts of insulin due to not feeling well,l but did not go completely without insulin. She was so weak she fell out of the car in the  parking lot. She feels light-headed when standing. She denies constipation, melena, hematochezia, syncope.     At [facility] she received zosyn for sepsis of GI origin. She was transported to [facility], during which she became hypotensive. Lactate on arrival was 2.9 from 4 at . In the ED she was found to be in Afib with RVR which aborted after digoxin. She received 30 cc/kg LR bolus and IV vancomycin for broader coverage. Hypotension resolved with IVF and norepinephrine  was not initiated. Intensivist Dr. Nagy was contacted and agreed with admission to [facility].    On presentation patient found to have lactic acid of 2.9 and recheck lactic acid started trending down.  She also found to have acute kidney injury with elevated BUN/creatinine and with IV fluid administration renal function has been improving.  She also found to have blood glucose of 373 with anion gap of 18 and bicarb of 18 and she did not meet criteria for insulin gtt. and she was placed on subcu insulin and her acidosis resolved.    Interval Problem Update  10/31/22    I evaluated and examined her at the bedside.    She reported she continued to have esophageal pain.  Due to low blood glucose I decrease her insulin.  Continue insulin at this time to avoid DKA.  This morning she is hemodynamically stable.  Her current blood pressure count is 23.2, hemoglobin of 10.7 which is in similar range and prior several studies.  Normal platelet count of 274.  She found to have blood glucose her current current sodium level is 132.  Current BUN is 54 and creatinine of 2.07.  She found to have calcium of 7.9.  Lactic acid slightly trended up.  I ordered repeat BMP and lactic acid for this morning.  Currently she has been receiving IV Protonix and Zosyn.  I discussed plan of care with bedside RN.    I have discussed this patient's plan of care and discharge plan at IDT rounds today with Case Management, Nursing, Nursing leadership, and other members of the IDT team.    Consultants/Specialty  critical care and GI    Code Status  DNAR/DNI    Disposition  Patient is not medically cleared for discharge.   Anticipate discharge to to home with close outpatient follow-up.  I have placed the appropriate orders for post-discharge needs.    Review of Systems  Review of Systems   Constitutional:  Negative for chills, fever and weight loss.   HENT:  Negative for hearing loss and tinnitus.    Eyes:  Negative for blurred vision, double vision,  photophobia and pain.   Respiratory:  Negative for cough, sputum production and shortness of breath.    Cardiovascular:  Negative for chest pain, palpitations, orthopnea and leg swelling.   Gastrointestinal:  Negative for abdominal pain, constipation, diarrhea, nausea and vomiting.        Pain on swallowing.     Genitourinary:  Negative for dysuria, frequency and urgency.   Musculoskeletal:  Negative for back pain, joint pain, myalgias and neck pain.   Skin:  Negative for rash.   Neurological:  Negative for dizziness, tingling, tremors, sensory change, speech change, focal weakness and headaches.   Psychiatric/Behavioral:  Negative for hallucinations and substance abuse.    All other systems reviewed and are negative.     Physical Exam  Temp:  [35.8 °C (96.5 °F)-36.1 °C (97 °F)] 35.8 °C (96.5 °F)  Pulse:  [54-92] 61  Resp:  [6-60] 23  BP: ()/(50-81) 113/53  SpO2:  [85 %-100 %] 90 %    Physical Exam  Vitals reviewed.   Constitutional:       General: She is not in acute distress.     Appearance: She is obese. She is not ill-appearing.   HENT:      Head: Normocephalic and atraumatic.      Nose: No congestion.   Eyes:      General:         Right eye: No discharge.         Left eye: No discharge.      Pupils: Pupils are equal, round, and reactive to light.   Cardiovascular:      Rate and Rhythm: Regular rhythm. Bradycardia present.      Pulses: Normal pulses.      Heart sounds: Normal heart sounds. No murmur heard.  Pulmonary:      Effort: Pulmonary effort is normal. No respiratory distress.      Breath sounds: Normal breath sounds. No stridor.   Abdominal:      General: Bowel sounds are normal. There is no distension.      Palpations: Abdomen is soft.      Tenderness: There is no abdominal tenderness.   Musculoskeletal:         General: No swelling or tenderness. Normal range of motion.      Cervical back: Normal range of motion. No rigidity.   Skin:     General: Skin is warm.      Capillary Refill: Capillary  refill takes less than 2 seconds.      Coloration: Skin is not jaundiced or pale.      Findings: No bruising.   Neurological:      General: No focal deficit present.      Mental Status: She is alert and oriented to person, place, and time.      Cranial Nerves: No cranial nerve deficit.   Psychiatric:         Mood and Affect: Mood normal.         Behavior: Behavior normal.       Fluids    Intake/Output Summary (Last 24 hours) at 10/31/2022 0848  Last data filed at 10/31/2022 0541  Gross per 24 hour   Intake 3619.1 ml   Output 1875 ml   Net 1744.1 ml       Laboratory  Recent Labs     10/30/22  0702 10/31/22  0141   WBC 27.3* 23.2*   RBC 5.67* 4.42   HEMOGLOBIN 13.7 10.7*   HEMATOCRIT 44.0 36.1*   MCV 77.6* 81.7   MCH 24.2* 24.2*   MCHC 31.1* 29.6*   RDW 58.1* 62.1*   PLATELETCT 413 274   MPV 10.1 9.8     Recent Labs     10/30/22  0702 10/30/22  1548 10/31/22  0141   SODIUM 136 135 132*   POTASSIUM 3.7 4.0 4.5   CHLORIDE 100 109 101   CO2 18* 15* 21   GLUCOSE 373* 290* 76   BUN 59* 49* 54*   CREATININE 2.29* 1.65* 2.07*   CALCIUM 8.4* 6.5* 7.9*     Recent Labs     10/30/22  0702   APTT 28.8   INR 1.16*               Imaging  CT-CHEST,ABDOMEN,PELVIS WITH   Final Result   Addendum (preliminary) 1 of 1   Additional history is provided that she has esophagitis and there is    recent esophageal dilation.      There is no mediastinal air. Therefore, there is no evidence for rupture.      The esophagus does appear to have wall thickening consistent with    inflammation.      Final      1.  No inflammatory process identified within the chest, abdomen, pelvis      2.  Mild intrahepatic dilation, nonspecific      3.  Mild bilateral atelectasis and small bilateral pleural effusion      4.  Marked left ventricular hypertrophy      5.  Small kidney consistent with sequela of chronic process      6.  Splenic calcification consistent with granuloma      7.  Enlarged uterus containing an 8.3 cm mass consistent with a fibroid       DX-CHEST-PORTABLE (1 VIEW)   Final Result         1.  No acute cardiopulmonary abnormality.   2.  Prominent pericardial fat again noted.           Assessment/Plan  * Atrial fibrillation with rapid ventricular response (HCC)- (present on admission)  Assessment & Plan  Confirmed on EKG in ED 10/30/22  [id number] = 5, SQH for now  Metoprolol 25 q8h, titrate to HR <110 and monitor HR/BP in setting of sepsis  She is not on full anticoagulation as outpatient  She may need further evaluation for initiation of anticoagulation.      Fall- (present on admission)  Assessment & Plan  Due to severe sepsis and weakness  PT / OT eval    Chronic bilateral low back pain without sciatica- (present on admission)  Assessment & Plan  Likely due to obesity  APAP PRN  NSAIDs contraindicated due to TAO and gastritis  Pain protocol - oxycodone and IV dilaudid PRN  Gabapentin deferred per patient protocol due to odynophagia    Anxiety and depression- (present on admission)  Assessment & Plan  Continue mirtazapine per patient preference  Restart SSRI / buspar when able to tolerate PO better    Esophageal thrush (HCC)- (present on admission)  Assessment & Plan  Visible oral thrush with reported odynophagia  Likely due to esophagitis and altered mucosal surface  Nystatin swish & swallow    Esophagitis- (present on admission)  Assessment & Plan  EGD 6/14 with Dr. Jo demonstrated severe Grade D esophagitis  Follows with Dr. Dos Santos, s/p dilation 10/6, has not been taking PPI  IV PPI, transition to PO BID when stable  Red flags of weight loss, odynophagia: GI consult - unlikely to need acute intervention  SLP eval for odynophagia  Continue to monitor.    High anion gap metabolic acidosis- (present on admission)  Assessment & Plan  Due to lactic acidosis and mild DKA  30 cc/kg per sepsis protocol  I ordered repeat lactic acid this morning.        Acute respiratory failure with hypoxemia (HCC)- (present on admission)  Assessment & Plan  Likely  due to sepsis  CT chest negative for infiltrates, GGOs, only mild effusions  TTE June 2022 LVEF 70%  Likely HFpEF, conservative IVF  DDimer ordered was elevated but it could be inflammatory process.  Oxygen saturation has been improving.  If her clinical condition get worse she may need CTA pulmonary.  I am holding to avoid contrast-induced nephropathy in the setting of TAO.  I order ultrasound DVT bilateral lower extremities.  Zosyn empirically for sepsis from GI source    Diabetic ketoacidosis without coma associated with type 2 diabetes mellitus (HCC)- (present on admission)  Assessment & Plan  Mild  Hyperglycemic, BHB positive, AGMA  Does not meet criteria for insulin gtt, SSI q6h without glargine per intensivist and pharmacist recommendations  Now resolved.    Hematemesis- (present on admission)  Assessment & Plan  Likely due to gastritis and esophagitis seen on EGD 6/15  Hemoglobin remained stable.      Severe sepsis with acute organ dysfunction (HCC)- (present on admission)  Assessment & Plan  This is Severe Sepsis Present on admission  SIRS criteria identified on my evaluation include: Tachycardia, with heart rate greater than 90 BPM, Tachypnea, with respirations greater than 20 per minute and Leukocytosis, with WBC greater than 12,000  Clinical indicators of end organ dysfunction include Systolic blood pressure (SBP) <90 mmHg or mean arterial pressure <65 mmHg, Acute respiratory failure as evidenced by a new need for invasive or non-invasive mechanical ventilation (Ventilator or BiPap) , Creatinine >2.0 (Without ESRD or CKD) and Lactate >2 mmol/L (18.0 mg/dL)  Source is Likely Gastrointestinal / Esophageal  Sepsis protocol initiated  Crystalloid Fluid Administration: Fluid resuscitation ordered per standard protocol - 30 mL/kg per current or ideal body weight  IV antibiotics as appropriate for source of sepsis  Reassessment: I have reassessed the patient's hemodynamic status    CT CAP without apparent  inflammatory etiology  Likely due to esophagitis  Reports no diarrhea in preceding 5 days, if 3+ BMs daily will order Cdiff  Empiric vancomycin & zosyn at [place] [facility] ED  MRSA nares came back negative and discontinued vancomycin.  Procalcitonin ordered on admission & 48h  Follow culture results.    Chest pain- (present on admission)  Assessment & Plan  The ASCVD Risk score (Filemon COOL, et al., 2019) failed to calculate for the following reasons:    The patient has a prior MI or stroke diagnosis    Likely due to esophagitis  Troponins negative at [place], negative stress test June 2012  EKG without ischemic signs  Telemetry  GI cocktail contraindicated due to lidocaine allergy    Primary hypertension- (present on admission)  Assessment & Plan  Holding amlodipine and lisinopril in setting of sepsis and blood pressure is running on the lower side but  Continue metoprolol tartrate 25 mg every 8 hourly for A. Fib.  Continue to monitor.    TAO (acute kidney injury) (HCC)- (present on admission)  Assessment & Plan  Due to severe sepsis and hypotension  UA to screen for casts / ATN  CT scan abdomen and pelvis did not show hydronephrosis.  Repeat BMP       I discussed plan of care during multidisciplinary rounds regarding patient's current medical condition and plan of care.    VTE prophylaxis: heparin ppx    I have performed a physical exam and reviewed and updated ROS and Plan today (10/31/2022). In review of yesterday's note (10/30/2022), there are no changes except as documented above.

## 2022-10-31 NOTE — PROGRESS NOTES
O2 sat reading 85% on RA. Pulse ox changed. Good wave form. Sats still 85%. Placed on 2L N/C. Pt states she has at home and occasionally wears 2 L. Odell improved to 94%.

## 2022-10-31 NOTE — PROGRESS NOTES
12 hour chart check    Monitor Summary:    SB-SR w/ rare PVCs  HR 54-62  .19/.08/.47

## 2022-10-31 NOTE — CARE PLAN
The patient is Watcher - Medium risk of patient condition declining or worsening    Shift Goals  Clinical Goals: Hemodynamic Stability  Patient Goals: Pain management  Family Goals: FRANCISCO    Progress made toward(s) clinical / shift goals:    Problem: Knowledge Deficit - Standard  Goal: Patient and family/care givers will demonstrate understanding of plan of care, disease process/condition, diagnostic tests and medications  Outcome: Progressing     Problem: Hemodynamics  Goal: Patient's hemodynamics, fluid balance and neurologic status will be stable or improve  Outcome: Progressing     Problem: Fluid Volume  Goal: Fluid volume balance will be maintained  Outcome: Progressing     Problem: Pain - Standard  Goal: Alleviation of pain or a reduction in pain to the patient’s comfort goal  Outcome: Progressing     Problem: Skin Integrity  Goal: Skin integrity is maintained or improved  Outcome: Progressing     Problem: Depression  Goal: Patient and family/caregiver will verbalize accurate information about at least two of the possible causes of depression, three-four of the signs and symptoms of depression  Outcome: Progressing       Patient is not progressing towards the following goals:

## 2022-11-01 ENCOUNTER — APPOINTMENT (OUTPATIENT)
Dept: RADIOLOGY | Facility: MEDICAL CENTER | Age: 67
DRG: 871 | End: 2022-11-01
Attending: INTERNAL MEDICINE
Payer: MEDICARE

## 2022-11-01 LAB
ANION GAP SERPL CALC-SCNC: 10 MMOL/L (ref 7–16)
BUN SERPL-MCNC: 38 MG/DL (ref 8–22)
CALCIUM SERPL-MCNC: 8.1 MG/DL (ref 8.5–10.5)
CHLORIDE SERPL-SCNC: 102 MMOL/L (ref 96–112)
CO2 SERPL-SCNC: 22 MMOL/L (ref 20–33)
CREAT SERPL-MCNC: 1.76 MG/DL (ref 0.5–1.4)
ERYTHROCYTE [DISTWIDTH] IN BLOOD BY AUTOMATED COUNT: 60.5 FL (ref 35.9–50)
GFR SERPLBLD CREATININE-BSD FMLA CKD-EPI: 31 ML/MIN/1.73 M 2
GLUCOSE BLD STRIP.AUTO-MCNC: 147 MG/DL (ref 65–99)
GLUCOSE BLD STRIP.AUTO-MCNC: 148 MG/DL (ref 65–99)
GLUCOSE BLD STRIP.AUTO-MCNC: 167 MG/DL (ref 65–99)
GLUCOSE BLD STRIP.AUTO-MCNC: 176 MG/DL (ref 65–99)
GLUCOSE BLD STRIP.AUTO-MCNC: 194 MG/DL (ref 65–99)
GLUCOSE BLD STRIP.AUTO-MCNC: 198 MG/DL (ref 65–99)
GLUCOSE SERPL-MCNC: 190 MG/DL (ref 65–99)
HCT VFR BLD AUTO: 33 % (ref 37–47)
HGB BLD-MCNC: 10.5 G/DL (ref 12–16)
MAGNESIUM SERPL-MCNC: 1.7 MG/DL (ref 1.5–2.5)
MCH RBC QN AUTO: 25.3 PG (ref 27–33)
MCHC RBC AUTO-ENTMCNC: 31.8 G/DL (ref 33.6–35)
MCV RBC AUTO: 79.5 FL (ref 81.4–97.8)
NT-PROBNP SERPL IA-MCNC: 4634 PG/ML (ref 0–125)
PLATELET # BLD AUTO: 345 K/UL (ref 164–446)
PMV BLD AUTO: 10.8 FL (ref 9–12.9)
POTASSIUM SERPL-SCNC: 4.7 MMOL/L (ref 3.6–5.5)
PROCALCITONIN SERPL-MCNC: 0.19 NG/ML
RBC # BLD AUTO: 4.15 M/UL (ref 4.2–5.4)
SODIUM SERPL-SCNC: 134 MMOL/L (ref 135–145)
WBC # BLD AUTO: 13.5 K/UL (ref 4.8–10.8)

## 2022-11-01 PROCEDURE — 84145 PROCALCITONIN (PCT): CPT

## 2022-11-01 PROCEDURE — 82962 GLUCOSE BLOOD TEST: CPT

## 2022-11-01 PROCEDURE — 700111 HCHG RX REV CODE 636 W/ 250 OVERRIDE (IP): Performed by: STUDENT IN AN ORGANIZED HEALTH CARE EDUCATION/TRAINING PROGRAM

## 2022-11-01 PROCEDURE — A9270 NON-COVERED ITEM OR SERVICE: HCPCS | Performed by: STUDENT IN AN ORGANIZED HEALTH CARE EDUCATION/TRAINING PROGRAM

## 2022-11-01 PROCEDURE — 99233 SBSQ HOSP IP/OBS HIGH 50: CPT | Performed by: HOSPITALIST

## 2022-11-01 PROCEDURE — A9270 NON-COVERED ITEM OR SERVICE: HCPCS | Performed by: HOSPITALIST

## 2022-11-01 PROCEDURE — 93970 EXTREMITY STUDY: CPT | Mod: 26,GZ | Performed by: INTERNAL MEDICINE

## 2022-11-01 PROCEDURE — 770020 HCHG ROOM/CARE - TELE (206)

## 2022-11-01 PROCEDURE — 700102 HCHG RX REV CODE 250 W/ 637 OVERRIDE(OP): Performed by: STUDENT IN AN ORGANIZED HEALTH CARE EDUCATION/TRAINING PROGRAM

## 2022-11-01 PROCEDURE — 700105 HCHG RX REV CODE 258: Performed by: STUDENT IN AN ORGANIZED HEALTH CARE EDUCATION/TRAINING PROGRAM

## 2022-11-01 PROCEDURE — 83735 ASSAY OF MAGNESIUM: CPT

## 2022-11-01 PROCEDURE — C9113 INJ PANTOPRAZOLE SODIUM, VIA: HCPCS | Performed by: STUDENT IN AN ORGANIZED HEALTH CARE EDUCATION/TRAINING PROGRAM

## 2022-11-01 PROCEDURE — 93970 EXTREMITY STUDY: CPT

## 2022-11-01 PROCEDURE — 80048 BASIC METABOLIC PNL TOTAL CA: CPT

## 2022-11-01 PROCEDURE — 85027 COMPLETE CBC AUTOMATED: CPT

## 2022-11-01 PROCEDURE — 83880 ASSAY OF NATRIURETIC PEPTIDE: CPT

## 2022-11-01 PROCEDURE — 700102 HCHG RX REV CODE 250 W/ 637 OVERRIDE(OP): Performed by: HOSPITALIST

## 2022-11-01 RX ORDER — OMEPRAZOLE 20 MG/1
20 CAPSULE, DELAYED RELEASE ORAL 2 TIMES DAILY
Status: DISCONTINUED | OUTPATIENT
Start: 2022-11-01 | End: 2022-11-04 | Stop reason: HOSPADM

## 2022-11-01 RX ORDER — SUCRALFATE ORAL 1 G/10ML
1 SUSPENSION ORAL EVERY 6 HOURS
Status: DISCONTINUED | OUTPATIENT
Start: 2022-11-01 | End: 2022-11-04 | Stop reason: HOSPADM

## 2022-11-01 RX ADMIN — OMEPRAZOLE 20 MG: 20 CAPSULE, DELAYED RELEASE ORAL at 18:26

## 2022-11-01 RX ADMIN — INSULIN HUMAN 3 UNITS: 100 INJECTION, SOLUTION PARENTERAL at 05:59

## 2022-11-01 RX ADMIN — METOPROLOL TARTRATE 25 MG: 25 TABLET, FILM COATED ORAL at 14:09

## 2022-11-01 RX ADMIN — OXYCODONE HYDROCHLORIDE 10 MG: 10 TABLET ORAL at 18:26

## 2022-11-01 RX ADMIN — METOPROLOL TARTRATE 25 MG: 25 TABLET, FILM COATED ORAL at 05:59

## 2022-11-01 RX ADMIN — SUCRALFATE 1 G: 1 SUSPENSION ORAL at 18:26

## 2022-11-01 RX ADMIN — HEPARIN SODIUM 5000 UNITS: 5000 INJECTION, SOLUTION INTRAVENOUS; SUBCUTANEOUS at 14:10

## 2022-11-01 RX ADMIN — HEPARIN SODIUM 5000 UNITS: 5000 INJECTION, SOLUTION INTRAVENOUS; SUBCUTANEOUS at 05:59

## 2022-11-01 RX ADMIN — OXYCODONE HYDROCHLORIDE 10 MG: 10 TABLET ORAL at 22:58

## 2022-11-01 RX ADMIN — INSULIN HUMAN 3 UNITS: 100 INJECTION, SOLUTION PARENTERAL at 23:05

## 2022-11-01 RX ADMIN — OXYCODONE HYDROCHLORIDE 10 MG: 10 TABLET ORAL at 00:46

## 2022-11-01 RX ADMIN — OXYCODONE HYDROCHLORIDE 10 MG: 10 TABLET ORAL at 10:38

## 2022-11-01 RX ADMIN — OXYCODONE HYDROCHLORIDE 10 MG: 10 TABLET ORAL at 14:10

## 2022-11-01 RX ADMIN — NYSTATIN 500000 UNITS: 100000 SUSPENSION ORAL at 13:02

## 2022-11-01 RX ADMIN — INSULIN HUMAN 3 UNITS: 100 INJECTION, SOLUTION PARENTERAL at 13:04

## 2022-11-01 RX ADMIN — PANTOPRAZOLE SODIUM 40 MG: 40 INJECTION, POWDER, LYOPHILIZED, FOR SOLUTION INTRAVENOUS at 05:58

## 2022-11-01 RX ADMIN — NYSTATIN 500000 UNITS: 100000 SUSPENSION ORAL at 18:26

## 2022-11-01 RX ADMIN — INSULIN HUMAN 3 UNITS: 100 INJECTION, SOLUTION PARENTERAL at 18:32

## 2022-11-01 RX ADMIN — OXYCODONE HYDROCHLORIDE 10 MG: 10 TABLET ORAL at 06:48

## 2022-11-01 RX ADMIN — NYSTATIN 500000 UNITS: 100000 SUSPENSION ORAL at 20:58

## 2022-11-01 RX ADMIN — NYSTATIN 500000 UNITS: 100000 SUSPENSION ORAL at 08:24

## 2022-11-01 RX ADMIN — METOPROLOL TARTRATE 25 MG: 25 TABLET, FILM COATED ORAL at 20:58

## 2022-11-01 RX ADMIN — MIRTAZAPINE 15 MG: 15 TABLET, FILM COATED ORAL at 20:58

## 2022-11-01 RX ADMIN — PIPERACILLIN AND TAZOBACTAM 3.38 G: 3; .375 INJECTION, POWDER, LYOPHILIZED, FOR SOLUTION INTRAVENOUS; PARENTERAL at 05:58

## 2022-11-01 RX ADMIN — OXYCODONE HYDROCHLORIDE 10 MG: 10 TABLET ORAL at 03:46

## 2022-11-01 RX ADMIN — HEPARIN SODIUM 5000 UNITS: 5000 INJECTION, SOLUTION INTRAVENOUS; SUBCUTANEOUS at 20:58

## 2022-11-01 RX ADMIN — SUCRALFATE 1 G: 1 SUSPENSION ORAL at 22:58

## 2022-11-01 RX ADMIN — SUCRALFATE 1 G: 1 SUSPENSION ORAL at 13:04

## 2022-11-01 ASSESSMENT — ENCOUNTER SYMPTOMS
SORE THROAT: 0
ABDOMINAL PAIN: 1
VOMITING: 0
SHORTNESS OF BREATH: 0
DIZZINESS: 0
NAUSEA: 0
DOUBLE VISION: 0
BACK PAIN: 0
PALPITATIONS: 0
DIARRHEA: 0
COUGH: 0
HEADACHES: 0
FEVER: 0
CHILLS: 0
BLURRED VISION: 0
LOSS OF CONSCIOUSNESS: 0

## 2022-11-01 ASSESSMENT — PAIN DESCRIPTION - PAIN TYPE
TYPE: ACUTE PAIN

## 2022-11-01 ASSESSMENT — FIBROSIS 4 INDEX
FIB4 SCORE: 0.94
FIB4 SCORE: 0.75

## 2022-11-01 NOTE — CARE PLAN
The patient is Watcher - Medium risk of patient condition declining or worsening    Shift Goals  Clinical Goals: hemodynamic stability  Patient Goals: pain management  Family Goals: FRANCISCO    Progress made toward(s) clinical / shift goals:    Problem: Knowledge Deficit - Standard  Goal: Patient and family/care givers will demonstrate understanding of plan of care, disease process/condition, diagnostic tests and medications  Outcome: Progressing     Problem: Hemodynamics  Goal: Patient's hemodynamics, fluid balance and neurologic status will be stable or improve  Outcome: Progressing     Problem: Fluid Volume  Goal: Fluid volume balance will be maintained  Outcome: Progressing     Problem: Urinary - Renal Perfusion  Goal: Ability to achieve and maintain adequate renal perfusion and functioning will improve  Outcome: Progressing     Problem: Respiratory  Goal: Patient will achieve/maintain optimum respiratory ventilation and gas exchange  Outcome: Progressing     Problem: Pain - Standard  Goal: Alleviation of pain or a reduction in pain to the patient’s comfort goal  Outcome: Progressing       Patient is not progressing towards the following goals:

## 2022-11-01 NOTE — DIETARY
"Nutrition services: Day 2 of admit.  Asmita Willis is a 67 y.o. female with admitting DX of A-fib (HCC)  Consult received for MST of 4 and supplements      Assessment:  Height: 167.6 cm (5' 6\")  Weight: 92.8 kg (204 lb 9.4 oz) on 11/1 via bed scale.  Body mass index is 33.02 kg/m²., BMI classification: Class I obesity  Diet/Intake: Pureed    Evaluation:   MST of 4 reports 34 or more lbs of weight loss in 3 months with no decrease in appetite.  Spoke with Pt at bed side. Confirms weight loss d/t swallowing difficulty. Pt reports a UBW of 277 lbs with last time weighing 277 lbs back in June/July of 2022. On 6/16/22 Pt weighed 235 lbs (significant 13% in 5 months) per RD note via chart review.  Pt states that she has not been able to eat well at home d/t swallowing issue. Pt states that she was having purees at home but the  broke and then the Pt mostly ate tomato soup and cream of wheat resulting in her weight loss starting in June/July 2022.  Pt receiving Boost glucose control but Pt dislikes them and would like them d/c'd. Pt would like to try magic cups. PO thus far an avg of ~50-75%. Nutrition representatives to see Pt for further preferences for pureed foods.  No indications of muscle loss or fat loss per nutrition focused physical exam.  Labs: Na 134 (L). Glucose 190 (H). Bun 38 (H). Creatinine 1.76 (H). GFR 31 (L).   Meds: SSI. Remeron. BM protocol.  Last BM: 10/30    Malnutrition Risk: Pt meets ASPEN criteria for moderate malnutrition in the context of social or environmental circumstances R/T poor nutrition quality of life secondary to swallowing difficultly AEB 13% severe weight loss in 5 months and <75% estimated energy requirements >/= 3 months.    Recommendations/Plan:  Magic cups BID  Encourage intake of all meals  Document intake of all meals as % taken in ADL's to provide interdisciplinary communication across all shifts.   Monitor weight.  Nutrition rep will continue to see patient for " ongoing meal and snack preferences.     RD following.

## 2022-11-01 NOTE — PROGRESS NOTES
"Hospital Medicine Daily Progress Note    Date of Service  11/1/2022    Chief Complaint  Asmita Willis is a 67 y.o. female admitted 10/30/2022 with CP/abd pain    Hospital Course  66yo PMHx HTN, IDDM, Sz disorder, NSTEMI, chronic back pain.  Recently inpt at [facility] for abd pain and found to have severe esophagitis/gastritis/duodenitis on EGD 6/15.  Underwent esophageal dialation 10/6.  Presented to an outlying facility with ongoing CP.  Sent to us with Dx of sepsis from abd source.  On presentation here found to be in AFib RVR which converted after Dig load.  Pt admitted to [facility] with Dx of AFib, sepsis from abd source, mild DKA    Interval Problem Update  \"Im hurting\"  pt states her chronic pain she's been having for several months is bothering her this am.  Pain is the same as it has been but she hasn't had her pain meds yet this am.  No SOB, N/V, diarrhea, fever/chills.    AFebrile  50s sinus  -140s  On 6 LNC    I have discussed this patient's plan of care and discharge plan at IDT rounds today with Case Management, Nursing, Nursing leadership, and other members of the IDT team.    Consultants/Specialty      Code Status  DNAR/DNI    Disposition  Patient is not medically cleared for discharge.   Anticipate discharge to to home with close outpatient follow-up.  I have placed the appropriate orders for post-discharge needs.    Review of Systems  Review of Systems   Constitutional:  Negative for chills and fever.   HENT:  Negative for nosebleeds and sore throat.    Eyes:  Negative for blurred vision and double vision.   Respiratory:  Negative for cough and shortness of breath.    Cardiovascular:  Positive for chest pain. Negative for palpitations and leg swelling.   Gastrointestinal:  Positive for abdominal pain. Negative for diarrhea, nausea and vomiting.   Genitourinary:  Negative for dysuria and urgency.   Musculoskeletal:  Negative for back pain.   Skin:  Negative for rash.   Neurological:  Negative for " dizziness, loss of consciousness and headaches.      Physical Exam  Temp:  [35.9 °C (96.6 °F)-36 °C (96.8 °F)] 36 °C (96.8 °F)  Pulse:  [52-66] 63  Resp:  [9-31] 17  BP: ()/(40-69) 119/59  SpO2:  [86 %-98 %] 95 %    Physical Exam  Constitutional:       General: She is not in acute distress.     Appearance: Normal appearance. She is well-developed. She is not diaphoretic.   HENT:      Head: Normocephalic and atraumatic.   Neck:      Vascular: No JVD.   Cardiovascular:      Rate and Rhythm: Normal rate and regular rhythm.      Heart sounds: Murmur heard.   Pulmonary:      Effort: Pulmonary effort is normal. No respiratory distress.      Breath sounds: No stridor. No wheezing or rales.   Abdominal:      Palpations: Abdomen is soft.      Tenderness: There is no abdominal tenderness. There is no guarding or rebound.   Musculoskeletal:         General: No tenderness.      Right lower leg: No edema.      Left lower leg: No edema.   Skin:     General: Skin is warm and dry.      Capillary Refill: Capillary refill takes less than 2 seconds.      Coloration: Skin is not jaundiced.      Findings: No rash.   Neurological:      Mental Status: She is alert and oriented to person, place, and time.   Psychiatric:         Mood and Affect: Mood normal.         Behavior: Behavior normal.         Thought Content: Thought content normal.       Fluids    Intake/Output Summary (Last 24 hours) at 11/1/2022 0657  Last data filed at 11/1/2022 0600  Gross per 24 hour   Intake --   Output 3150 ml   Net -3150 ml       Laboratory  Recent Labs     10/30/22  0702 10/31/22  0141 10/31/22  1135 11/01/22  0615   WBC 27.3* 23.2*  --  13.5*   RBC 5.67* 4.42  --  4.15*   HEMOGLOBIN 13.7 10.7* 10.5* 10.5*   HEMATOCRIT 44.0 36.1* 34.2* 33.0*   MCV 77.6* 81.7  --  79.5*   MCH 24.2* 24.2*  --  25.3*   MCHC 31.1* 29.6*  --  31.8*   RDW 58.1* 62.1*  --  60.5*   PLATELETCT 413 274  --  345   MPV 10.1 9.8  --  10.8     Recent Labs     10/30/22  3197  10/31/22  0141 10/31/22  1135 11/01/22  0615   SODIUM 135 132* 133*  --    POTASSIUM 4.0 4.5 4.8  --    CHLORIDE 109 101 101  --    CO2 15* 21 20  --    GLUCOSE 290* 76 77  --    BUN 49* 54* 48*  --    CREATININE 1.65* 2.07* 1.91* 1.76*   CALCIUM 6.5* 7.9* 8.3*  --      Recent Labs     10/30/22  0702   APTT 28.8   INR 1.16*               Imaging  CT-CHEST,ABDOMEN,PELVIS WITH   Final Result   Addendum (preliminary) 1 of 1   Additional history is provided that she has esophagitis and there is    recent esophageal dilation.      There is no mediastinal air. Therefore, there is no evidence for rupture.      The esophagus does appear to have wall thickening consistent with    inflammation.      Final      1.  No inflammatory process identified within the chest, abdomen, pelvis      2.  Mild intrahepatic dilation, nonspecific      3.  Mild bilateral atelectasis and small bilateral pleural effusion      4.  Marked left ventricular hypertrophy      5.  Small kidney consistent with sequela of chronic process      6.  Splenic calcification consistent with granuloma      7.  Enlarged uterus containing an 8.3 cm mass consistent with a fibroid      DX-CHEST-PORTABLE (1 VIEW)   Final Result         1.  No acute cardiopulmonary abnormality.   2.  Prominent pericardial fat again noted.      US-EXTREMITY VENOUS LOWER BILAT    (Results Pending)        Assessment/Plan  * Atrial fibrillation with rapid ventricular response (HCC)- (present on admission)  Assessment & Plan  Confirmed on EKG in ED 10/30/22  [id number] = 5, SQH for now  Metoprolol 25 q8h, titrate to HR <110 and monitor HR/BP in setting of sepsis  Heparin SC for now given esophagitis, hemetemesis     Fall- (present on admission)  Assessment & Plan  Due to severe sepsis and weakness  PT / OT eval    Chronic bilateral low back pain without sciatica- (present on admission)  Assessment & Plan  Likely due to obesity  APAP PRN  NSAIDs contraindicated due to TAO and  gastritis  Pain protocol - oxycodone and IV dilaudid PRN  Gabapentin deferred per patient protocol due to odynophagia    Anxiety and depression- (present on admission)  Assessment & Plan  Continue mirtazapine per patient preference  Restart SSRI / buspar when able to tolerate PO better    Esophageal thrush (HCC)- (present on admission)  Assessment & Plan  Visible oral thrush with reported odynophagia  Likely due to esophagitis and altered mucosal surface  Nystatin swish & swallow    Esophagitis- (present on admission)  Assessment & Plan  EGD 6/14 with Dr. Jo demonstrated severe Grade D esophagitis  Follows with Dr. Dos Santos, s/p dilation 10/6, has not been taking PPI  IV PPI, transition to PO BID when stable  Red flags of weight loss, odynophagia: GI consult - unlikely to need acute intervention  SLP eval for odynophagia  Sucralfate slurry    High anion gap metabolic acidosis- (present on admission)  Assessment & Plan  Due to lactic acidosis and mild DKA  30 cc/kg per sepsis protocol  resolved        Acute respiratory failure with hypoxemia (HCC)- (present on admission)  Assessment & Plan  Likely due to sepsis  CT chest negative for infiltrates, GGOs, only mild effusions  TTE June 2022 LVEF 70% with LV hypertrophy noted  Likely HFpEF,  DDimer ordered was elevated but it could be inflammatory process.  Oxygen saturation has been improving.  If her clinical condition get worse she may need CTA pulmonary.  I am holding to avoid contrast-induced nephropathy in the setting of TAO.  DVT studies pending  TAO make CTA problematic  Check BNP  Start heparin gtts      Diabetic ketoacidosis without coma associated with type 2 diabetes mellitus (HCC)- (present on admission)  Assessment & Plan  Mild  Hyperglycemic, BHB positive, AGMA  Does not meet criteria for insulin gtt, SSI q6h without glargine per intensivist and pharmacist recommendations  Now resolved.  BG's have been mid 100s  Follow AG/CO2    Hematemesis- (present on  admission)  Assessment & Plan  Likely due to gastritis and esophagitis seen on EGD 6/15  Hemoglobin remained stable.  IV PPI  Start sucralfate      Severe sepsis with acute organ dysfunction (HCC)- (present on admission)  Assessment & Plan  This is Severe Sepsis Present on admission  Source is Likely Gastrointestinal / Esophageal  Sepsis protocol initiated    CT CAP without apparent inflammatory etiology  Likely due to esophagitis  Reports no diarrhea in preceding 5 days, if 3+ BMs daily will order Cdiff  Empiric vancomycin & zosyn at [facility] ED  MRSA nares came back negative and discontinued vancomycin.  Procalcitonin low  Cultures neg  DC Pip/Tazo; start amp/sulbactam.  Plan 5 days total of therapy assuming she cont's to do well    Chest pain- (present on admission)  Assessment & Plan  The ASCVD Risk score (Filemon COOL, et al., 2019) failed to calculate for the following reasons:    The patient has a prior MI or stroke diagnosis    Likely due to esophagitis  Trop 200-300 range which appears to be her baseline on review of previous labs  negative stress test June 2012  EKG without ischemic signs  CXR unremarkable  Telemetry  GI cocktail contraindicated due to lidocaine allergy  IV PPI  Start sucralfate    Primary hypertension- (present on admission)  Assessment & Plan  DC amlodipine  Holding ACEI( due to TAO  Continue metoprolol tartrate 25 mg every 8 hourly for A. Fib.  Continue to monitor.    TAO (acute kidney injury) (HCC)- (present on admission)  Assessment & Plan  Due to severe sepsis and hypotension  CT scan abdomen and pelvis did not show hydronephrosis.  Baseline creat 0.9  Trending down  Cont to follow daily BMP  Renally dose medications as appropriate         VTE prophylaxis: heparin ppx    I have performed a physical exam and reviewed and updated ROS and Plan today (11/1/2022). In review of yesterday's note (10/31/2022), there are no changes except as documented above.

## 2022-11-02 ENCOUNTER — APPOINTMENT (OUTPATIENT)
Dept: RADIOLOGY | Facility: MEDICAL CENTER | Age: 67
DRG: 871 | End: 2022-11-02
Attending: HOSPITALIST
Payer: MEDICARE

## 2022-11-02 PROBLEM — D64.89 OTHER SPECIFIED ANEMIAS: Status: ACTIVE | Noted: 2022-11-02

## 2022-11-02 LAB
ANION GAP SERPL CALC-SCNC: 9 MMOL/L (ref 7–16)
BACTERIA UR CULT: NORMAL
BUN SERPL-MCNC: 26 MG/DL (ref 8–22)
CALCIUM SERPL-MCNC: 8.2 MG/DL (ref 8.5–10.5)
CHLORIDE SERPL-SCNC: 105 MMOL/L (ref 96–112)
CO2 SERPL-SCNC: 22 MMOL/L (ref 20–33)
CREAT SERPL-MCNC: 1.52 MG/DL (ref 0.5–1.4)
FERRITIN SERPL-MCNC: 41.7 NG/ML (ref 10–291)
GFR SERPLBLD CREATININE-BSD FMLA CKD-EPI: 37 ML/MIN/1.73 M 2
GLUCOSE BLD STRIP.AUTO-MCNC: 113 MG/DL (ref 65–99)
GLUCOSE BLD STRIP.AUTO-MCNC: 161 MG/DL (ref 65–99)
GLUCOSE BLD STRIP.AUTO-MCNC: 190 MG/DL (ref 65–99)
GLUCOSE BLD STRIP.AUTO-MCNC: 216 MG/DL (ref 65–99)
GLUCOSE BLD STRIP.AUTO-MCNC: 232 MG/DL (ref 65–99)
GLUCOSE SERPL-MCNC: 158 MG/DL (ref 65–99)
IRON SATN MFR SERPL: 6 % (ref 15–55)
IRON SERPL-MCNC: 17 UG/DL (ref 40–170)
POTASSIUM SERPL-SCNC: 4.1 MMOL/L (ref 3.6–5.5)
SIGNIFICANT IND 70042: NORMAL
SITE SITE: NORMAL
SODIUM SERPL-SCNC: 136 MMOL/L (ref 135–145)
SOURCE SOURCE: NORMAL
TIBC SERPL-MCNC: 282 UG/DL (ref 250–450)
UIBC SERPL-MCNC: 265 UG/DL (ref 110–370)

## 2022-11-02 PROCEDURE — 82728 ASSAY OF FERRITIN: CPT

## 2022-11-02 PROCEDURE — 97116 GAIT TRAINING THERAPY: CPT

## 2022-11-02 PROCEDURE — 36415 COLL VENOUS BLD VENIPUNCTURE: CPT

## 2022-11-02 PROCEDURE — 99232 SBSQ HOSP IP/OBS MODERATE 35: CPT | Performed by: HOSPITALIST

## 2022-11-02 PROCEDURE — 700111 HCHG RX REV CODE 636 W/ 250 OVERRIDE (IP): Performed by: STUDENT IN AN ORGANIZED HEALTH CARE EDUCATION/TRAINING PROGRAM

## 2022-11-02 PROCEDURE — A9270 NON-COVERED ITEM OR SERVICE: HCPCS | Performed by: INTERNAL MEDICINE

## 2022-11-02 PROCEDURE — A9270 NON-COVERED ITEM OR SERVICE: HCPCS | Performed by: HOSPITALIST

## 2022-11-02 PROCEDURE — A9270 NON-COVERED ITEM OR SERVICE: HCPCS | Performed by: STUDENT IN AN ORGANIZED HEALTH CARE EDUCATION/TRAINING PROGRAM

## 2022-11-02 PROCEDURE — 80048 BASIC METABOLIC PNL TOTAL CA: CPT

## 2022-11-02 PROCEDURE — 82962 GLUCOSE BLOOD TEST: CPT | Mod: 91

## 2022-11-02 PROCEDURE — 700102 HCHG RX REV CODE 250 W/ 637 OVERRIDE(OP): Performed by: STUDENT IN AN ORGANIZED HEALTH CARE EDUCATION/TRAINING PROGRAM

## 2022-11-02 PROCEDURE — 700102 HCHG RX REV CODE 250 W/ 637 OVERRIDE(OP): Performed by: HOSPITALIST

## 2022-11-02 PROCEDURE — 74220 X-RAY XM ESOPHAGUS 1CNTRST: CPT

## 2022-11-02 PROCEDURE — 97530 THERAPEUTIC ACTIVITIES: CPT

## 2022-11-02 PROCEDURE — 770020 HCHG ROOM/CARE - TELE (206)

## 2022-11-02 PROCEDURE — 83550 IRON BINDING TEST: CPT

## 2022-11-02 PROCEDURE — 700102 HCHG RX REV CODE 250 W/ 637 OVERRIDE(OP): Performed by: INTERNAL MEDICINE

## 2022-11-02 PROCEDURE — 83540 ASSAY OF IRON: CPT

## 2022-11-02 PROCEDURE — 92526 ORAL FUNCTION THERAPY: CPT

## 2022-11-02 RX ADMIN — NYSTATIN 500000 UNITS: 100000 SUSPENSION ORAL at 17:17

## 2022-11-02 RX ADMIN — SUCRALFATE 1 G: 1 SUSPENSION ORAL at 04:47

## 2022-11-02 RX ADMIN — HEPARIN SODIUM 5000 UNITS: 5000 INJECTION, SOLUTION INTRAVENOUS; SUBCUTANEOUS at 04:48

## 2022-11-02 RX ADMIN — OXYCODONE HYDROCHLORIDE 10 MG: 10 TABLET ORAL at 02:25

## 2022-11-02 RX ADMIN — OMEPRAZOLE 20 MG: 20 CAPSULE, DELAYED RELEASE ORAL at 17:16

## 2022-11-02 RX ADMIN — OMEPRAZOLE 20 MG: 20 CAPSULE, DELAYED RELEASE ORAL at 04:48

## 2022-11-02 RX ADMIN — INSULIN HUMAN 4 UNITS: 100 INJECTION, SOLUTION PARENTERAL at 17:20

## 2022-11-02 RX ADMIN — METOPROLOL TARTRATE 25 MG: 25 TABLET, FILM COATED ORAL at 14:20

## 2022-11-02 RX ADMIN — METOPROLOL TARTRATE 25 MG: 25 TABLET, FILM COATED ORAL at 21:01

## 2022-11-02 RX ADMIN — OXYCODONE HYDROCHLORIDE 10 MG: 10 TABLET ORAL at 14:23

## 2022-11-02 RX ADMIN — OXYCODONE HYDROCHLORIDE 10 MG: 10 TABLET ORAL at 11:05

## 2022-11-02 RX ADMIN — MIRTAZAPINE 15 MG: 15 TABLET, FILM COATED ORAL at 21:01

## 2022-11-02 RX ADMIN — INSULIN HUMAN 3 UNITS: 100 INJECTION, SOLUTION PARENTERAL at 23:09

## 2022-11-02 RX ADMIN — NYSTATIN 500000 UNITS: 100000 SUSPENSION ORAL at 14:22

## 2022-11-02 RX ADMIN — SUCRALFATE 1 G: 1 SUSPENSION ORAL at 12:00

## 2022-11-02 RX ADMIN — SUCRALFATE 1 G: 1 SUSPENSION ORAL at 23:07

## 2022-11-02 RX ADMIN — OXYCODONE HYDROCHLORIDE 10 MG: 10 TABLET ORAL at 05:40

## 2022-11-02 RX ADMIN — DIPHENHYDRAMINE HYDROCHLORIDE 25 MG: 25 TABLET ORAL at 21:01

## 2022-11-02 RX ADMIN — INSULIN HUMAN 4 UNITS: 100 INJECTION, SOLUTION PARENTERAL at 11:08

## 2022-11-02 RX ADMIN — SUCRALFATE 1 G: 1 SUSPENSION ORAL at 17:16

## 2022-11-02 RX ADMIN — OXYCODONE HYDROCHLORIDE 10 MG: 10 TABLET ORAL at 17:24

## 2022-11-02 RX ADMIN — METOPROLOL TARTRATE 25 MG: 25 TABLET, FILM COATED ORAL at 04:48

## 2022-11-02 RX ADMIN — HEPARIN SODIUM 5000 UNITS: 5000 INJECTION, SOLUTION INTRAVENOUS; SUBCUTANEOUS at 14:23

## 2022-11-02 RX ADMIN — HEPARIN SODIUM 5000 UNITS: 5000 INJECTION, SOLUTION INTRAVENOUS; SUBCUTANEOUS at 21:02

## 2022-11-02 RX ADMIN — ONDANSETRON 4 MG: 4 TABLET, ORALLY DISINTEGRATING ORAL at 19:21

## 2022-11-02 RX ADMIN — OXYCODONE HYDROCHLORIDE 10 MG: 10 TABLET ORAL at 21:02

## 2022-11-02 ASSESSMENT — ENCOUNTER SYMPTOMS
STRIDOR: 0
ABDOMINAL PAIN: 0
NAUSEA: 0
SHORTNESS OF BREATH: 0
BACK PAIN: 0
DIZZINESS: 0
COUGH: 0
NERVOUS/ANXIOUS: 0
WEIGHT LOSS: 1
HEADACHES: 0
FEVER: 0
SPEECH CHANGE: 0
PALPITATIONS: 0
SORE THROAT: 1
DIARRHEA: 0
EYE DISCHARGE: 0

## 2022-11-02 ASSESSMENT — PAIN DESCRIPTION - PAIN TYPE
TYPE: ACUTE PAIN

## 2022-11-02 ASSESSMENT — COGNITIVE AND FUNCTIONAL STATUS - GENERAL
MOBILITY SCORE: 21
SUGGESTED CMS G CODE MODIFIER MOBILITY: CJ
CLIMB 3 TO 5 STEPS WITH RAILING: A LITTLE
WALKING IN HOSPITAL ROOM: A LITTLE
STANDING UP FROM CHAIR USING ARMS: A LITTLE

## 2022-11-02 ASSESSMENT — FIBROSIS 4 INDEX: FIB4 SCORE: 0.75

## 2022-11-02 ASSESSMENT — GAIT ASSESSMENTS
DEVIATION: BRADYKINETIC
GAIT LEVEL OF ASSIST: CONTACT GUARD ASSIST
DISTANCE (FEET): 80
ASSISTIVE DEVICE: FRONT WHEEL WALKER

## 2022-11-02 ASSESSMENT — LIFESTYLE VARIABLES: SUBSTANCE_ABUSE: 1

## 2022-11-02 NOTE — PROGRESS NOTES
Hospital Medicine Daily Progress Note    Date of Service  11/2/2022    Chief Complaint  Transfer from [facility] for hypotension and irregular heart rhythm with new diagnosis of atrial fibrillation of the rapid ventricular heartbeat.    Hospital Course  Asmita Willis is a 67 y.o. female with a history of diabetes, hypertension, chronic pain, and seizure disorder that was admitted 10/30/2022 with new diagnosis of atrial fibrillation with a rapid ventricular heart rate that was diagnosed at [facility] and transferred here to [facility].  Also concerning for sepsis with leukocytosis and abdominal/chest pain.  Also of significance patient had been admitted to [facility] for abdominal pain Nisha 15, 2022 and underwent an EGD and found to have severe esophagitis/gastritis and duodenitis.  Per notes the patient had a esophageal dilation in [facility] in October 6th this year.  Per initial history and physical patient has had a 70 pound weight loss over the last 2 months due to difficulty taking any orals due to a dyne aphasia.  She had not been taking any of her oral medications except mirtazapine at night.  There is also reports she had been having diarrhea before hospitalization.    Patient was initiated on IV Zosyn IV fluids and IV diltiazem and transferred to [facility].  Patient was admitted for severe sepsis unclear etiology concerning for possible bacteremia given her GI history.  At [facility] ER she was given digoxin and aborted her atrial fibrillation.  Patient did receive IV fluid bolus and infusions and was initially transferred to the stepdown unit.    Interval Problem Update  11/2/2022: Is awake and alert sitting up at bedside eating a popsicle.  Still complains of sore throat but it is improving.  Patient states she has been able to take some of her medications with applesauce.    I have discussed this patient's plan of care and discharge plan at IDT rounds today with Case Management,  "Nursing, Nursing leadership, and other members of the IDT team.    Consultants/Specialty  None    Code Status  DNAR/DNI    Disposition  Patient is not medically cleared for discharge.   Anticipate discharge to  be determined .  I have placed the appropriate orders for post-discharge needs.    Review of Systems  Review of Systems   Constitutional:  Positive for weight loss. Negative for fever.   HENT:  Positive for sore throat (\"improving\").    Eyes:  Negative for discharge.   Respiratory:  Negative for cough, shortness of breath and stridor.    Cardiovascular:  Negative for chest pain, palpitations and leg swelling.   Gastrointestinal:  Negative for abdominal pain, diarrhea and nausea.   Genitourinary:  Negative for dysuria and hematuria.   Musculoskeletal:  Negative for back pain and joint pain.   Neurological:  Negative for dizziness, speech change and headaches.   Psychiatric/Behavioral:  Positive for substance abuse (marijauna). The patient is not nervous/anxious.       Physical Exam  Temp:  [36.6 °C (97.9 °F)-37.4 °C (99.3 °F)] 37 °C (98.6 °F)  Pulse:  [55-69] 60  Resp:  [16-20] 18  BP: (114-184)/(46-81) 114/64  SpO2:  [90 %-96 %] 96 %    Physical Exam    Fluids    Intake/Output Summary (Last 24 hours) at 11/2/2022 1323  Last data filed at 11/2/2022 1004  Gross per 24 hour   Intake 800 ml   Output --   Net 800 ml       Laboratory  Recent Labs     10/31/22  0141 10/31/22  1135 11/01/22  0615   WBC 23.2*  --  13.5*   RBC 4.42  --  4.15*   HEMOGLOBIN 10.7* 10.5* 10.5*   HEMATOCRIT 36.1* 34.2* 33.0*   MCV 81.7  --  79.5*   MCH 24.2*  --  25.3*   MCHC 29.6*  --  31.8*   RDW 62.1*  --  60.5*   PLATELETCT 274  --  345   MPV 9.8  --  10.8     Recent Labs     10/31/22  1135 11/01/22  0615 11/02/22  0116   SODIUM 133* 134* 136   POTASSIUM 4.8 4.7 4.1   CHLORIDE 101 102 105   CO2 20 22 22   GLUCOSE 77 190* 158*   BUN 48* 38* 26*   CREATININE 1.91* 1.76* 1.52*   CALCIUM 8.3* 8.1* 8.2*               "     Imaging  US-EXTREMITY VENOUS LOWER BILAT   Final Result      CT-CHEST,ABDOMEN,PELVIS WITH   Final Result   Addendum (preliminary) 1 of 1   Additional history is provided that she has esophagitis and there is    recent esophageal dilation.      There is no mediastinal air. Therefore, there is no evidence for rupture.      The esophagus does appear to have wall thickening consistent with    inflammation.      Final      1.  No inflammatory process identified within the chest, abdomen, pelvis      2.  Mild intrahepatic dilation, nonspecific      3.  Mild bilateral atelectasis and small bilateral pleural effusion      4.  Marked left ventricular hypertrophy      5.  Small kidney consistent with sequela of chronic process      6.  Splenic calcification consistent with granuloma      7.  Enlarged uterus containing an 8.3 cm mass consistent with a fibroid      DX-CHEST-PORTABLE (1 VIEW)   Final Result         1.  No acute cardiopulmonary abnormality.   2.  Prominent pericardial fat again noted.      DX-ESOPHAGUS BARIUM SWALLOW SINGLE CONTRAST    (Results Pending)        Assessment/Plan  * Atrial fibrillation with rapid ventricular response (HCC)- (present on admission)  Assessment & Plan  Confirmed on EKG in ED 10/30/22  [id number] = 5, SQH for now  Metoprolol 25 q8h, titrate to HR <110 and monitor HR/BP in setting of sepsis  Heparin SC for now given esophagitis, significant hemetemesis per patient.  Monitor on telemetry    Other specified anemias  Assessment & Plan  We will check an iron, TIBC, ferritin  Likely chronic blood loss due to history of severe esophagitis, hematemesis  Monitoring CBC    Fall- (present on admission)  Assessment & Plan  Due to severe sepsis and weakness  PT / OT eval    Chronic bilateral low back pain without sciatica- (present on admission)  Assessment & Plan  Likely due to obesity  APAP PRN  NSAIDs contraindicated due to TAO and gastritis  Pain protocol - oxycodone and IV dilaudid  PRN  Gabapentin deferred per patient protocol due to odynophagia  Cannot do lidocaine patch due to allergy     Anxiety and depression- (present on admission)  Assessment & Plan  Continue mirtazapine per patient preference  Restart SSRI / buspar when able to tolerate PO better    Esophageal thrush (HCC)- (present on admission)  Assessment & Plan  Visible oral thrush with reported odynophagia  Likely due to esophagitis and altered mucosal surface  Nystatin swish & swallow    Esophagitis- (present on admission)  Assessment & Plan  EGD 6/14 with Dr. Jo demonstrated severe Grade D esophagitis  Follows with Dr. Dos Santos, s/p dilation 10/6, has not been taking PPI  IV PPI, transition to PO BID when stable  Red flags of weight loss, odynophagia: GI consult - unlikely to need acute intervention  SLP eval for odynophagia  Sucralfate slurry  Checking esophogram.    High anion gap metabolic acidosis- (present on admission)  Assessment & Plan  Due to lactic acidosis and mild DKA  30 cc/kg per sepsis protocol  resolved    Acute respiratory failure with hypoxemia (HCC)- (present on admission)  Assessment & Plan  Likely due to sepsis  CT chest negative for infiltrates, only mild effusions  TTE June 2022 LVEF 70% with LV hypertrophy noted  Likely HFpEF  DDimer ordered was elevated but it could be inflammatory process.  Oxygen saturation has been improving.  Wean oxygen as tolerates to keep SPO2 greater than 88%  If her clinical condition get worse she may need CTA pulmonary.  I am holding to avoid contrast-induced nephropathy in the setting of TAO.  DVT studies pending  TAO make CTA problematic we will reconsider if renal function improves enough.      Diabetic ketoacidosis without coma associated with type 2 diabetes mellitus (HCC)- (present on admission)  Assessment & Plan  Mild  Hyperglycemic, BHB positive, AGMA  Does not meet criteria for insulin gtt, SSI q6h without glargine per intensivist and pharmacist recommendations  Now  resolved.  BG's have been mid 100s  Follow AG/CO2    Hematemesis- (present on admission)  Assessment & Plan  Likely due to gastritis and esophagitis seen on EGD 6/15  Hemoglobin remained stable.  IV PPI  Start sucralfate      Severe sepsis with acute organ dysfunction (HCC)- (present on admission)  Assessment & Plan  This is Severe Sepsis Present on admission  Source is Likely Gastrointestinal / Esophageal  Sepsis protocol initiated    CT CAP without apparent inflammatory etiology  Likely due to esophagitis  Reports no diarrhea in preceding 5 days, if 3+ BMs daily will order Cdiff  Empiric vancomycin & zosyn at [facility] ED  MRSA nares came back negative and discontinued vancomycin.  Procalcitonin low  Cultures neg  DC Pip/Tazo; start amp/sulbactam.  Plan 5 days total of therapy assuming she cont's to do well    Chest pain- (present on admission)  Assessment & Plan  The ASCVD Risk score (Filemon COOL, et al., 2019) failed to calculate for the following reasons:    The patient has a prior MI or stroke diagnosis    Likely due to esophagitis  Trop 200-300 range which appears to be her baseline on review of previous labs  negative stress test June 2012  EKG without ischemic signs  CXR unremarkable  Telemetry  GI cocktail contraindicated due to lidocaine allergy  IV PPI  Continue on sucralfate    Primary hypertension- (present on admission)  Assessment & Plan  DC amlodipine  Holding ACEI( due to TAO  Continue metoprolol tartrate 25 mg every 8 hourly for A. Fib.  Continue to monitor.    TAO (acute kidney injury) (HCC)- (present on admission)  Assessment & Plan  Due to severe sepsis and hypotension  CT scan abdomen and pelvis did not show hydronephrosis.  Continuing to treat with IV fluids  Baseline creat 0.9  11/2 BUN: 26, creatinine: 1.52  10/31 BUN: 48, creatinine: 1.91       VTE prophylaxis: SCDs/TEDs and heparin ppx    I have performed a physical exam and reviewed and updated ROS and Plan today (11/2/2022). In review  of yesterday's note (11/1/2022), there are no changes except as documented above.

## 2022-11-02 NOTE — DISCHARGE PLANNING
Agency/Facility Name: [facility]   Outcome: DPA left Vmail regarding Pt's baseline liter flow with request of call back.

## 2022-11-02 NOTE — PROGRESS NOTES
Received report from IMCU RN, pt care assumed, VSS, pt assessment complete. Pt AAOx4. No signs of acute distress noted at this time. Plan of care discussed with pt and verbalizes no questions. Pt denies any additional needs at this time. Bed locked/in lowest position, bed alarm on, pt educated on fall risk and verbalized understanding, call light within reach, hourly rounding initiated.

## 2022-11-02 NOTE — PROGRESS NOTES
4 Eyes Skin Assessment Completed by WILLY Landeros and WILLY De Los Santos.    Head WDL  Ears WDL  Nose WDL  Mouth WDL, coated white tongue   Neck WDL  Breast/Chest WDL  Shoulder Blades WDL  Spine WDL  (R) Arm/Elbow/Hand Bruising  (L) Arm/Elbow/Hand WDL  Abdomen WDL  Groin Redness and Blanching  Scrotum/Coccyx/Buttocks Redness and Blanching  (R) Leg WDL  (L) Leg WDL  (R) Heel/Foot/Toe WDL  (L) Heel/Foot/Toe WDL          Devices In Places Tele Box, Blood Pressure Cuff, and Pulse Ox      Interventions In Place Gray Ear Foams, Heel Mepilex, Sacral Mepilex, and Pressure Redistribution Mattress    Possible Skin Injury No    Pictures Uploaded Into Epic N/A  Wound Consult Placed N/A  RN Wound Prevention Protocol Ordered Yes

## 2022-11-02 NOTE — THERAPY
"Physical Therapy   Daily Treatment     Patient Name: Asmita Willis  Age:  67 y.o., Sex:  female  Medical Record #: [id number]  Today's Date: 11/2/2022     Precautions  Precautions: Fall Risk;Swallow Precautions ( See Comments)    Assessment    Patient seen for follow up PT treatment session and demos improved activity tolerance and independence with mobility. She was able to get to the EOB without assist and ambulate 80' w/FWW and no O2. She does fatigue and is limited by pain. However, she reports only household mobility at baseline. She does have the goal to ambulate in the community again. She will benefit from HHPT.     Plan    Continue current treatment plan.    DC Equipment Recommendations: None  Discharge Recommendations: Recommend home health for continued physical therapy services      Subjective    \"Is it time for the shower?\"     Objective       11/02/22 1500   Precautions   Precautions Fall Risk;Swallow Precautions ( See Comments)   Pain 0 - 10 Group   Location Throat;Back   Pain Rating Scale (NPRS) 4   Therapist Pain Assessment During Activity   Cognition    Cognition / Consciousness WDL   Level of Consciousness Alert   Comments pleasant and cooperative. tearful at times   Sitting Lower Body Exercises   Sitting Lower Body Exercises Yes   Ankle Pumps 1 set of 10;Bilateral   Long Arc Quad 1 set of 10;Bilateral   Comments pre-gait warm up   Neuro-Muscular Treatments   Neuro-Muscular Treatments Postural Facilitation;Weight Shift Right;Weight Shift Left;Verbal Cuing   Comments patient unable to tolerate full upright posture 2/2 spinal stenosis   Other Treatments   Other Treatments Provided Discussed shower set-up for later with RN and set-up chair so patient can have meals OOB   Balance   Sitting Balance (Static) Fair   Sitting Balance (Dynamic) Fair   Standing Balance (Static) Fair   Standing Balance (Dynamic) Fair   Weight Shift Sitting Good   Weight Shift Standing Good   Skilled Intervention Verbal Cuing "   Gait Analysis   Gait Level Of Assist Contact Guard Assist   Assistive Device Front Wheel Walker   Distance (Feet) 80   # of Times Distance was Traveled 1   Deviation Bradykinetic  (FF trunk)   Skilled Intervention Postural Facilitation;Tactile Cuing   Comments able to ambulate on RA and satting at 90%   Bed Mobility    Supine to Sit Supervised   Sit to Supine Supervised   Scooting Supervised   Rolling Supervised   Skilled Intervention Verbal Cuing   Functional Mobility   Sit to Stand Supervised   Bed, Chair, Wheelchair Transfer Supervised   How much difficulty does the patient currently have...   Turning over in bed (including adjusting bedclothes, sheets and blankets)? 4   Sitting down on and standing up from a chair with arms (e.g., wheelchair, bedside commode, etc.) 4   Moving from lying on back to sitting on the side of the bed? 4   How much help from another person does the patient currently need...   Moving to and from a bed to a chair (including a wheelchair)? 3   Need to walk in a hospital room? 3   Climbing 3-5 steps with a railing? 3   6 clicks Mobility Score 21   Activity Tolerance   Sitting in Chair refused   Sitting Edge of Bed 5 min   Standing 10 min   Short Term Goals    Short Term Goal # 1 Pt will ambulate x100 feet using FWW with supervision in 6 visits to improve functional indep.   Goal Outcome # 1 Progressing as expected   Short Term Goal # 2 Pt will negotiate 3 stairs with supervision in 6 visits to access home.   Goal Outcome # 2 Goal not met   Anticipated Discharge Equipment and Recommendations   DC Equipment Recommendations None   Discharge Recommendations Recommend home health for continued physical therapy services     Yolanda Ramirez, PT, DPT, GCS

## 2022-11-02 NOTE — CARE PLAN
Shift Goals  Clinical Goals: ADLs  Patient Goals: Shower and chapel  Family Goals: FRANCISCO    Medicating per MAR, utilizing non-pharm and pharm measures for anxiety, facilitating ADLs, fall, skin, and infection risk interventions in place.     Problem: Knowledge Deficit - Standard  Goal: Patient and family/care givers will demonstrate understanding of plan of care, disease process/condition, diagnostic tests and medications  Outcome: Progressing     Problem: Hemodynamics  Goal: Patient's hemodynamics, fluid balance and neurologic status will be stable or improve  Outcome: Met     Problem: Fluid Volume  Goal: Fluid volume balance will be maintained  Outcome: Met     Problem: Respiratory  Goal: Patient will achieve/maintain optimum respiratory ventilation and gas exchange  Outcome: Progressing     Problem: Mechanical Ventilation  Goal: Safe management of artificial airway and ventilation  Outcome: Met  Goal: Successful weaning off mechanical ventilator, spontaneously maintains adequate gas exchange  Outcome: Met  Goal: Patient will be able to express needs and understand communication  Outcome: Met

## 2022-11-02 NOTE — DISCHARGE PLANNING
Case Management Discharge Planning    Admission Date: 10/30/2022  GMLOS: 5  ALOS: 3    6-Clicks ADL Score: 17  6-Clicks Mobility Score: 23      PT and/or OT Eval ordered: Yes  Post-acute Referrals Ordered: No  Post-acute Choice Obtained: No  Has referral(s) been sent to post-acute provider:  No      Anticipated Discharge Dispo: Discharge Disposition: Discharged to home/self care (01)    DME Needed: Yes    DME Ordered: No  Pt uses oxygen at [facility] DME as baseline;    Action(s) Taken: OTHER    Escalations Completed: DME Company    Medically Clear: No    Next Steps: f/u with pt and medical team to discuss dc needs and barriers.    Barriers to Discharge: Medical clearance and DME

## 2022-11-03 LAB
ANION GAP SERPL CALC-SCNC: 9 MMOL/L (ref 7–16)
BUN SERPL-MCNC: 19 MG/DL (ref 8–22)
CALCIUM SERPL-MCNC: 8.5 MG/DL (ref 8.5–10.5)
CHLORIDE SERPL-SCNC: 106 MMOL/L (ref 96–112)
CO2 SERPL-SCNC: 24 MMOL/L (ref 20–33)
CREAT SERPL-MCNC: 1.17 MG/DL (ref 0.5–1.4)
ERYTHROCYTE [DISTWIDTH] IN BLOOD BY AUTOMATED COUNT: 58.7 FL (ref 35.9–50)
GFR SERPLBLD CREATININE-BSD FMLA CKD-EPI: 51 ML/MIN/1.73 M 2
GLUCOSE BLD STRIP.AUTO-MCNC: 119 MG/DL (ref 65–99)
GLUCOSE BLD STRIP.AUTO-MCNC: 158 MG/DL (ref 65–99)
GLUCOSE BLD STRIP.AUTO-MCNC: 172 MG/DL (ref 65–99)
GLUCOSE BLD STRIP.AUTO-MCNC: 217 MG/DL (ref 65–99)
GLUCOSE BLD STRIP.AUTO-MCNC: 263 MG/DL (ref 65–99)
GLUCOSE SERPL-MCNC: 164 MG/DL (ref 65–99)
HCT VFR BLD AUTO: 31.9 % (ref 37–47)
HGB BLD-MCNC: 9.9 G/DL (ref 12–16)
MCH RBC QN AUTO: 24.7 PG (ref 27–33)
MCHC RBC AUTO-ENTMCNC: 31 G/DL (ref 33.6–35)
MCV RBC AUTO: 79.6 FL (ref 81.4–97.8)
PLATELET # BLD AUTO: 299 K/UL (ref 164–446)
PMV BLD AUTO: 10.7 FL (ref 9–12.9)
POTASSIUM SERPL-SCNC: 4.3 MMOL/L (ref 3.6–5.5)
RBC # BLD AUTO: 4.01 M/UL (ref 4.2–5.4)
SODIUM SERPL-SCNC: 139 MMOL/L (ref 135–145)
WBC # BLD AUTO: 11.5 K/UL (ref 4.8–10.8)

## 2022-11-03 PROCEDURE — 770020 HCHG ROOM/CARE - TELE (206)

## 2022-11-03 PROCEDURE — A9270 NON-COVERED ITEM OR SERVICE: HCPCS | Performed by: STUDENT IN AN ORGANIZED HEALTH CARE EDUCATION/TRAINING PROGRAM

## 2022-11-03 PROCEDURE — 85027 COMPLETE CBC AUTOMATED: CPT

## 2022-11-03 PROCEDURE — 700102 HCHG RX REV CODE 250 W/ 637 OVERRIDE(OP): Performed by: STUDENT IN AN ORGANIZED HEALTH CARE EDUCATION/TRAINING PROGRAM

## 2022-11-03 PROCEDURE — 82962 GLUCOSE BLOOD TEST: CPT

## 2022-11-03 PROCEDURE — 700102 HCHG RX REV CODE 250 W/ 637 OVERRIDE(OP): Performed by: HOSPITALIST

## 2022-11-03 PROCEDURE — 99232 SBSQ HOSP IP/OBS MODERATE 35: CPT | Performed by: HOSPITALIST

## 2022-11-03 PROCEDURE — 36415 COLL VENOUS BLD VENIPUNCTURE: CPT

## 2022-11-03 PROCEDURE — 700111 HCHG RX REV CODE 636 W/ 250 OVERRIDE (IP): Performed by: STUDENT IN AN ORGANIZED HEALTH CARE EDUCATION/TRAINING PROGRAM

## 2022-11-03 PROCEDURE — 99232 SBSQ HOSP IP/OBS MODERATE 35: CPT | Performed by: PSYCHIATRY & NEUROLOGY

## 2022-11-03 PROCEDURE — 90834 PSYTX W PT 45 MINUTES: CPT | Performed by: SOCIAL WORKER

## 2022-11-03 PROCEDURE — A9270 NON-COVERED ITEM OR SERVICE: HCPCS | Performed by: HOSPITALIST

## 2022-11-03 PROCEDURE — 80048 BASIC METABOLIC PNL TOTAL CA: CPT

## 2022-11-03 RX ADMIN — SUCRALFATE 1 G: 1 SUSPENSION ORAL at 12:24

## 2022-11-03 RX ADMIN — HEPARIN SODIUM 5000 UNITS: 5000 INJECTION, SOLUTION INTRAVENOUS; SUBCUTANEOUS at 05:36

## 2022-11-03 RX ADMIN — OXYCODONE HYDROCHLORIDE 10 MG: 10 TABLET ORAL at 13:56

## 2022-11-03 RX ADMIN — OMEPRAZOLE 20 MG: 20 CAPSULE, DELAYED RELEASE ORAL at 05:32

## 2022-11-03 RX ADMIN — ONDANSETRON 4 MG: 4 TABLET, ORALLY DISINTEGRATING ORAL at 09:23

## 2022-11-03 RX ADMIN — OXYCODONE HYDROCHLORIDE 10 MG: 10 TABLET ORAL at 18:19

## 2022-11-03 RX ADMIN — SUCRALFATE 1 G: 1 SUSPENSION ORAL at 18:05

## 2022-11-03 RX ADMIN — OXYCODONE HYDROCHLORIDE 10 MG: 10 TABLET ORAL at 09:22

## 2022-11-03 RX ADMIN — METOPROLOL TARTRATE 25 MG: 25 TABLET, FILM COATED ORAL at 22:33

## 2022-11-03 RX ADMIN — METOPROLOL TARTRATE 25 MG: 25 TABLET, FILM COATED ORAL at 12:26

## 2022-11-03 RX ADMIN — HEPARIN SODIUM 5000 UNITS: 5000 INJECTION, SOLUTION INTRAVENOUS; SUBCUTANEOUS at 12:24

## 2022-11-03 RX ADMIN — NYSTATIN 500000 UNITS: 100000 SUSPENSION ORAL at 08:29

## 2022-11-03 RX ADMIN — METOPROLOL TARTRATE 25 MG: 25 TABLET, FILM COATED ORAL at 05:32

## 2022-11-03 RX ADMIN — HEPARIN SODIUM 5000 UNITS: 5000 INJECTION, SOLUTION INTRAVENOUS; SUBCUTANEOUS at 22:33

## 2022-11-03 RX ADMIN — SUCRALFATE 1 G: 1 SUSPENSION ORAL at 23:40

## 2022-11-03 RX ADMIN — SUCRALFATE 1 G: 1 SUSPENSION ORAL at 05:32

## 2022-11-03 RX ADMIN — NYSTATIN 500000 UNITS: 100000 SUSPENSION ORAL at 20:39

## 2022-11-03 RX ADMIN — INSULIN HUMAN 4 UNITS: 100 INJECTION, SOLUTION PARENTERAL at 18:08

## 2022-11-03 RX ADMIN — OXYCODONE HYDROCHLORIDE 10 MG: 10 TABLET ORAL at 05:32

## 2022-11-03 RX ADMIN — MIRTAZAPINE 15 MG: 15 TABLET, FILM COATED ORAL at 20:39

## 2022-11-03 RX ADMIN — NYSTATIN 500000 UNITS: 100000 SUSPENSION ORAL at 12:24

## 2022-11-03 RX ADMIN — OXYCODONE HYDROCHLORIDE 10 MG: 10 TABLET ORAL at 02:28

## 2022-11-03 RX ADMIN — INSULIN HUMAN 3 UNITS: 100 INJECTION, SOLUTION PARENTERAL at 23:40

## 2022-11-03 RX ADMIN — INSULIN HUMAN 7 UNITS: 100 INJECTION, SOLUTION PARENTERAL at 12:27

## 2022-11-03 RX ADMIN — OMEPRAZOLE 20 MG: 20 CAPSULE, DELAYED RELEASE ORAL at 18:05

## 2022-11-03 RX ADMIN — NYSTATIN 500000 UNITS: 100000 SUSPENSION ORAL at 18:05

## 2022-11-03 RX ADMIN — OXYCODONE HYDROCHLORIDE 10 MG: 10 TABLET ORAL at 22:33

## 2022-11-03 ASSESSMENT — ENCOUNTER SYMPTOMS
NAUSEA: 1
ABDOMINAL PAIN: 0
EYE DISCHARGE: 0
RESPIRATORY NEGATIVE: 1
WEIGHT LOSS: 1
NERVOUS/ANXIOUS: 0
HEADACHES: 0
INSOMNIA: 0
PALPITATIONS: 0
FEVER: 0
CARDIOVASCULAR NEGATIVE: 1
NAUSEA: 0
STRIDOR: 0
DEPRESSION: 1
DIZZINESS: 0
DIARRHEA: 0
CONSTIPATION: 0
COUGH: 0
SORE THROAT: 1
SPEECH CHANGE: 0
SHORTNESS OF BREATH: 0
VOMITING: 0
BACK PAIN: 0

## 2022-11-03 ASSESSMENT — COGNITIVE AND FUNCTIONAL STATUS - GENERAL
DAILY ACTIVITIY SCORE: 17
TOILETING: A LITTLE
MOBILITY SCORE: 21
STANDING UP FROM CHAIR USING ARMS: A LITTLE
HELP NEEDED FOR BATHING: A LOT
DRESSING REGULAR UPPER BODY CLOTHING: A LITTLE
SUGGESTED CMS G CODE MODIFIER DAILY ACTIVITY: CK
WALKING IN HOSPITAL ROOM: A LITTLE
DRESSING REGULAR LOWER BODY CLOTHING: A LOT
CLIMB 3 TO 5 STEPS WITH RAILING: A LITTLE
SUGGESTED CMS G CODE MODIFIER MOBILITY: CJ
PERSONAL GROOMING: A LITTLE

## 2022-11-03 ASSESSMENT — PAIN DESCRIPTION - PAIN TYPE
TYPE: ACUTE PAIN

## 2022-11-03 ASSESSMENT — LIFESTYLE VARIABLES: SUBSTANCE_ABUSE: 1

## 2022-11-03 NOTE — CARE PLAN
Problem: Knowledge Deficit - Standard  Goal: Patient and family/care givers will demonstrate understanding of plan of care, disease process/condition, diagnostic tests and medications  Outcome: Progressing     Problem: Respiratory  Goal: Patient will achieve/maintain optimum respiratory ventilation and gas exchange  Outcome: Progressing     Problem: Physical Regulation  Goal: Diagnostic test results will improve  Outcome: Progressing     Problem: Pain - Standard  Goal: Alleviation of pain or a reduction in pain to the patient’s comfort goal  Outcome: Progressing     Problem: Skin Integrity  Goal: Skin integrity is maintained or improved  Outcome: Progressing     Problem: Fall Risk  Goal: Patient will remain free from falls  Outcome: Progressing   The patient is Stable - Low risk of patient condition declining or worsening    Shift Goals  Clinical Goals: hemodynamic stability, rest  Patient Goals: rest  Family Goals: FRANCISCO

## 2022-11-03 NOTE — PROGRESS NOTES
Report received, pt care assumed, tele box on. VSS, pt assessment complete. Pt aaox4, no signs of distress noted at this time.Pt denies any additional needs at this time. Bed in lowest position, pt educated on fall risk and verbalized understanding, call light within reach.

## 2022-11-03 NOTE — DISCHARGE PLANNING
Case Management Discharge Planning    Admission Date: 10/30/2022  GMLOS: 5  ALOS: 4    6-Clicks ADL Score: 17  6-Clicks Mobility Score: 21  PT and/or OT Eval ordered: Yes  Post-acute Referrals Ordered: Yes  Post-acute Choice Obtained: Yes  Has referral(s) been sent to post-acute provider:  Yes      Anticipated Discharge Dispo: Discharge Disposition: D/T to home under [facility] care in anticipation of covered skilled care (06)    DME Needed: No    Action(s) Taken: LSW met with pt at bedside for [facility] choice. Pt reported she was previously on service with [facility] and verified her PCP. Choice form faxed to Linda BURNS.    Escalations Completed: None    Medically Clear: No    Next Steps: Care coordination will f/u with [facility] referral    Barriers to Discharge: Medical clearance    Is the patient up for discharge tomorrow: No
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
## 2022-11-03 NOTE — DISCHARGE PLANNING
Received Choice form at 0861  Agency/Facility Name: [facility]   Referral sent per Choice form @ [phone]  Agency[facility]Fac[facility] Name: [facility]   Spoke To: Lulu   Outcome: Lulu called to verify when will Pt DC. DPA informed Pt is not medically cleared yet. Lulu requested to fax DC summary when available to fax # [phone].

## 2022-11-03 NOTE — CONSULTS
"PSYCHIATRIC FOLLOW-UP:(established)  *Reason for admission:    severe sepsis, DKA, TAO, hematemesis    *Legal Hold Status: not on hold             Chart reviewed.         *HPI:      Pt reports having some sadness and anxiety due to her current medical complications and being far from family, who lives [place] state. She spoke with them this morning, and reported feeling better after the call. He requested ativan for anxiety. Pt denies any active or passive SI and agrees with doing psychotherapy. She has fair sleep.       Medical ROS (as pertinent):     Review of Systems   Respiratory: Negative.     Cardiovascular: Negative.    Gastrointestinal:  Positive for nausea. Negative for abdominal pain, constipation, diarrhea and vomiting.   Psychiatric/Behavioral:  Positive for depression. Negative for suicidal ideas. The patient does not have insomnia.          *Psychiatric Examination:  Vitals:   Vitals:    11/03/22 1226   BP: (!) 151/66   Pulse: 60   Resp:    Temp:    SpO2:        General Appearance: obese, sitting up on the edge of the bed having lunch. Calm and cooperative   Abnormal Movements: none    Gait and Posture: sitting on bed   Speech: slow, soft  Thought processes:linear   Associations: no loose associations   Abnormal or Psychotic Thoughts: denies AVH, no delusions or paranoia   Judgement and Insight: fair/fair  Orientation: grossly oriented   Recent and Remote Memory: appears intact  Attention Span and Concentration: intact  Language: fluid  Fund of Knowledge:not tested  Mood and Affect:\"sad\", constricted  SI/HI:denies      *Labs personally reviewed:   Recent Results (from the past 24 hour(s))   IRON/TOTAL IRON BIND    Collection Time: 11/02/22  3:31 PM   Result Value Ref Range    Iron 17 (L) 40 - 170 ug/dL    Total Iron Binding 282 250 - 450 ug/dL    Unsat Iron Binding 265 110 - 370 ug/dL    % Saturation 6 (L) 15 - 55 %   FERRITIN    Collection Time: 11/02/22  3:31 PM   Result Value Ref Range    Ferritin " 41.7 10.0 - 291.0 ng/mL   POCT glucose device results    Collection Time: 11/02/22  5:19 PM   Result Value Ref Range    POC Glucose, Blood 216 (H) 65 - 99 mg/dL   POCT glucose device results    Collection Time: 11/02/22 11:04 PM   Result Value Ref Range    POC Glucose, Blood 161 (H) 65 - 99 mg/dL   POCT glucose device results    Collection Time: 11/03/22  5:31 AM   Result Value Ref Range    POC Glucose, Blood 119 (H) 65 - 99 mg/dL   Basic Metabolic Panel (BMP)    Collection Time: 11/03/22  7:08 AM   Result Value Ref Range    Sodium 139 135 - 145 mmol/L    Potassium 4.3 3.6 - 5.5 mmol/L    Chloride 106 96 - 112 mmol/L    Co2 24 20 - 33 mmol/L    Glucose 164 (H) 65 - 99 mg/dL    Bun 19 8 - 22 mg/dL    Creatinine 1.17 0.50 - 1.40 mg/dL    Calcium 8.5 8.5 - 10.5 mg/dL    Anion Gap 9.0 7.0 - 16.0   CBC WITHOUT DIFFERENTIAL    Collection Time: 11/03/22  7:08 AM   Result Value Ref Range    WBC 11.5 (H) 4.8 - 10.8 K/uL    RBC 4.01 (L) 4.20 - 5.40 M/uL    Hemoglobin 9.9 (L) 12.0 - 16.0 g/dL    Hematocrit 31.9 (L) 37.0 - 47.0 %    MCV 79.6 (L) 81.4 - 97.8 fL    MCH 24.7 (L) 27.0 - 33.0 pg    MCHC 31.0 (L) 33.6 - 35.0 g/dL    RDW 58.7 (H) 35.9 - 50.0 fL    Platelet Count 299 164 - 446 K/uL    MPV 10.7 9.0 - 12.9 fL   ESTIMATED GFR    Collection Time: 11/03/22  7:08 AM   Result Value Ref Range    GFR (CKD-EPI) 51 (A) >60 mL/min/1.73 m 2   POCT glucose device results    Collection Time: 11/03/22  8:32 AM   Result Value Ref Range    POC Glucose, Blood 158 (H) 65 - 99 mg/dL   POCT glucose device results    Collection Time: 11/03/22 12:09 PM   Result Value Ref Range    POC Glucose, Blood 263 (H) 65 - 99 mg/dL       Current Facility-Administered Medications   Medication Dose Route Frequency Provider Last Rate Last Admin    dextrose 10 % BOLUS 250 mL  250 mL Intravenous Q15 MIN PRN Enrique Tobar D.O.        sucralfate (CARAFATE) 1 GM/10ML suspension 1 g  1 g Oral Q6HRS Evan Palmer D.O.   1 g at 11/03/22 9312     omeprazole (PRILOSEC) capsule 20 mg  20 mg Oral BID Evan Palmer DNatiONati   20 mg at 11/03/22 0532    diphenhydrAMINE (BENADRYL) tablet/capsule 25 mg  25 mg Oral Q6HRS PRN Herman Fields D.O.   25 mg at 11/02/22 2101    insulin NPH (HumuLIN N,NovoLIN N) injection  10 Units Subcutaneous BID INSULIN Fartun Whatley M.D.   10 Units at 11/03/22 0838    mirtazapine (Remeron) tablet 15 mg  15 mg Oral Nightly Tye Koehler M.D.   15 mg at 11/02/22 2101    senna-docusate (PERICOLACE or SENOKOT S) 8.6-50 MG per tablet 2 Tablet  2 Tablet Oral BID PRN Tye Koehler M.D.        And    polyethylene glycol/lytes (MIRALAX) PACKET 1 Packet  1 Packet Oral QDAY PRN Tye Koehler M.D.        And    magnesium hydroxide (MILK OF MAGNESIA) suspension 30 mL  30 mL Oral QDAY PRN Tye Koehler M.D.        And    bisacodyl (DULCOLAX) suppository 10 mg  10 mg Rectal QDAY PRN Tye Koehler M.D.        heparin injection 5,000 Units  5,000 Units Subcutaneous Q8HRS Tye Koehler M.D.   5,000 Units at 11/03/22 1224    acetaminophen (TYLENOL) tablet 1,000 mg  1,000 mg Oral Q6HRS PRN Tye Koehler M.D.        ondansetron (ZOFRAN ODT) dispertab 4 mg  4 mg Oral Q4HRS PRN Tye Koehler M.D.   4 mg at 11/03/22 0923    nystatin (MYCOSTATIN) 888140 UNIT/ML suspension 500,000 Units  5 mL Swish & Swallow 4X/DAY Tye Koehler M.D.   500,000 Units at 11/03/22 1224    metoprolol tartrate (LOPRESSOR) tablet 25 mg  25 mg Oral Q8HRS Tye Koehler M.D.   25 mg at 11/03/22 1226    Pharmacy Consult Request ...Pain Management Review 1 Each  1 Each Other PHARMACY TO DOSE Tye M Koehler, M.D.        oxyCODONE immediate-release (ROXICODONE) tablet 5 mg  5 mg Oral Q3HRS PRN Tye Koehler M.D.        Or    oxyCODONE immediate release (ROXICODONE) tablet 10 mg  10 mg Oral Q3HRS PRN Tye Koehler M.D.   10 mg at 11/03/22 0922    Or    HYDROmorphone (Dilaudid) injection 0.5 mg  0.5 mg Intravenous Q3HRS PRN Tye Koehler M.D.        insulin regular  (HumuLIN R,NovoLIN R) injection  3-14 Units Subcutaneous Q6HRS Tye Koehler M.D.   7 Units at 11/03/22 1227         Assessment: Continues to deny SI/HI. She is accepting medical care and appears to have family support. Pt referred to psychotherapy, case discussed with Dr Paz.       Dx:  Adjustment disorder with depressive features  History of major depressive disorder   History of generalized anxiety disorder  History of insomnia    Medical:  Atrial fibrillation with RVR, present on admission  Diabetic ketoacidosis, present on admission  High anion gap metabolic acidosis , present on admission  Type 2 DM, chronic  Severe sepsis with acute organ dysfunction, present on admission  Acute kidney injury, present on admission  Acute respiratory failure with hypoxemia, present on admission  Esophageal thrush, present on admission  Esophagitis, present on admission  Hematemesis, present on admission  Chest pain, present on admission  Chronic bilateral low back pain without sciatica  Hx of hypertension  Hx of cannabinoid hyperemesis syndrome      Plan:  1- Legal hold:n/a  2- Psychotropic medications: consider discounting Remeron if QTC continues to be prolonged. Last . Repeat EKG.   3- pt referred to psychotherapy, will start today  4- Discussed the case with:  5- Psychiatry signing off.     Thank you for the consult.

## 2022-11-03 NOTE — FACE TO FACE
Face to Face Supporting Documentation - Home Health    The encounter with this patient was in whole or in part the primary reason for home health admission.    Date of encounter:   Patient:                    MRN:                       YOB: 2021  Asmita Willis  [id number]  1955     Home health to see patient for:  Physical Therapy evaluation and treatment and Occupational therapy evaluation and treatment    Skilled need for:  Comment: physical debility    Skilled nursing interventions to include:  Comment: Stregthening    Homebound status evidenced by:  Needs the assistance of another person in order to leave the home. Leaving home requires a considerable and taxing effort. There is a normal inability to leave the home.    Community Physician to provide follow up care: Pcp Pt States None     Optional Interventions? No      I certify the face to face encounter for this home health care referral meets the CMS requirements and the encounter/clinical assessment with the patient was, in whole, or in part, for the medical condition(s) listed above, which is the primary reason for home health care. Based on my clinical findings: the service(s) are medically necessary, support the need for home health care, and the homebound criteria are met.  I certify that this patient has had a face to face encounter by myself.  Enrique Tobar D.O. - NPI: [id number]

## 2022-11-03 NOTE — PROGRESS NOTES
Hospital Medicine Daily Progress Note    Date of Service  11/3/2022    Chief Complaint  Transfer from [facility] for hypotension and irregular heart rhythm with new diagnosis of atrial fibrillation of the rapid ventricular heartbeat.    Hospital Course  Asmita Willis is a 67 y.o. female with a history of diabetes, hypertension, chronic pain, and seizure disorder that was admitted 10/30/2022 with new diagnosis of atrial fibrillation with a rapid ventricular heart rate that was diagnosed at [facility] and transferred here to [facility].  Also concerning for sepsis with leukocytosis and abdominal/chest pain.  Also of significance patient had been admitted to [facility] for abdominal pain Nisha 15, 2022 and underwent an EGD and found to have severe esophagitis/gastritis and duodenitis.  Per notes the patient had a esophageal dilation in [facility] in October 6th this year.  Per initial history and physical patient has had a 70 pound weight loss over the last 2 months due to difficulty taking any orals due to a dyne aphasia.  She had not been taking any of her oral medications except mirtazapine at night.  There is also reports she had been having diarrhea before hospitalization.    Patient was initiated on IV Zosyn IV fluids and IV diltiazem and transferred to [facility].  Patient was admitted for severe sepsis unclear etiology concerning for possible bacteremia given her GI history.  At [facility] ER she was given digoxin and aborted her atrial fibrillation.  Patient did receive IV fluid bolus and infusions and was initially transferred to the stepdown unit.    Interval Problem Update  11/3/2022: physical therapy note recommend home health. Barium swallow showed GERD and presbyesophagus. Hgb:9.9, wbc:11.5    I have discussed this patient's plan of care and discharge plan at IDT rounds today with Case Management, Nursing, Nursing leadership, and other members of the IDT  "team.    Consultants/Specialty  None    Code Status  DNAR/DNI    Disposition  Patient is not medically cleared for discharge.   Anticipate discharge to  be determined .  I have placed the appropriate orders for post-discharge needs.    Review of Systems  Review of Systems   Constitutional:  Positive for weight loss. Negative for fever.   HENT:  Positive for sore throat (\"improving\").    Eyes:  Negative for discharge.   Respiratory:  Negative for cough, shortness of breath and stridor.    Cardiovascular:  Negative for chest pain, palpitations and leg swelling.   Gastrointestinal:  Negative for abdominal pain, diarrhea and nausea.   Genitourinary:  Negative for dysuria and hematuria.   Musculoskeletal:  Negative for back pain and joint pain.   Neurological:  Negative for dizziness, speech change and headaches.   Psychiatric/Behavioral:  Positive for substance abuse (marijauna). The patient is not nervous/anxious.       Physical Exam  Temp:  [36.2 °C (97.2 °F)-36.8 °C (98.2 °F)] 36.2 °C (97.2 °F)  Pulse:  [59-65] 60  Resp:  [16-18] 17  BP: (135-167)/(56-70) 151/66  SpO2:  [90 %-95 %] 90 %    Physical Exam  Vitals reviewed.   Constitutional:       Appearance: Normal appearance. She is obese. She is not diaphoretic.   HENT:      Head: Normocephalic and atraumatic.      Nose: Nose normal.      Mouth/Throat:      Mouth: Mucous membranes are moist.      Pharynx: No oropharyngeal exudate.   Eyes:      General: No scleral icterus.        Right eye: No discharge.         Left eye: No discharge.      Extraocular Movements: Extraocular movements intact.      Conjunctiva/sclera: Conjunctivae normal.   Cardiovascular:      Rate and Rhythm: Normal rate and regular rhythm.      Pulses:           Radial pulses are 2+ on the right side and 2+ on the left side.        Dorsalis pedis pulses are 2+ on the right side and 2+ on the left side.      Heart sounds: Normal heart sounds. No murmur heard.  Pulmonary:      Effort: Pulmonary effort " is normal. No respiratory distress.      Breath sounds: Normal breath sounds. No wheezing.   Abdominal:      General: Bowel sounds are normal. There is no distension.      Palpations: Abdomen is soft.      Tenderness: There is no abdominal tenderness.   Musculoskeletal:         General: No swelling or tenderness.      Cervical back: Neck supple. No tenderness. No muscular tenderness.      Right lower leg: No edema.      Left lower leg: No edema.   Skin:     Coloration: Skin is not jaundiced or pale.   Neurological:      General: No focal deficit present.      Mental Status: She is alert and oriented to person, place, and time. Mental status is at baseline.      Cranial Nerves: No cranial nerve deficit.   Psychiatric:         Mood and Affect: Mood normal.         Behavior: Behavior normal.       Fluids    Intake/Output Summary (Last 24 hours) at 11/3/2022 1228  Last data filed at 11/3/2022 0200  Gross per 24 hour   Intake 240 ml   Output --   Net 240 ml       Laboratory  Recent Labs     11/01/22  0615 11/03/22  0708   WBC 13.5* 11.5*   RBC 4.15* 4.01*   HEMOGLOBIN 10.5* 9.9*   HEMATOCRIT 33.0* 31.9*   MCV 79.5* 79.6*   MCH 25.3* 24.7*   MCHC 31.8* 31.0*   RDW 60.5* 58.7*   PLATELETCT 345 299   MPV 10.8 10.7     Recent Labs     11/01/22  0615 11/02/22  0116 11/03/22  0708   SODIUM 134* 136 139   POTASSIUM 4.7 4.1 4.3   CHLORIDE 102 105 106   CO2 22 22 24   GLUCOSE 190* 158* 164*   BUN 38* 26* 19   CREATININE 1.76* 1.52* 1.17   CALCIUM 8.1* 8.2* 8.5                   Imaging  DX-ESOPHAGUS BARIUM SWALLOW SINGLE CONTRAST   Final Result      1.  Presbyesophagus   2.  Gastroesophageal reflux   3.  Consider speech pathology evaluation      US-EXTREMITY VENOUS LOWER BILAT   Final Result      CT-CHEST,ABDOMEN,PELVIS WITH   Final Result   Addendum (preliminary) 1 of 1   Additional history is provided that she has esophagitis and there is    recent esophageal dilation.      There is no mediastinal air. Therefore, there is no  evidence for rupture.      The esophagus does appear to have wall thickening consistent with    inflammation.      Final      1.  No inflammatory process identified within the chest, abdomen, pelvis      2.  Mild intrahepatic dilation, nonspecific      3.  Mild bilateral atelectasis and small bilateral pleural effusion      4.  Marked left ventricular hypertrophy      5.  Small kidney consistent with sequela of chronic process      6.  Splenic calcification consistent with granuloma      7.  Enlarged uterus containing an 8.3 cm mass consistent with a fibroid      DX-CHEST-PORTABLE (1 VIEW)   Final Result         1.  No acute cardiopulmonary abnormality.   2.  Prominent pericardial fat again noted.           Assessment/Plan  * Atrial fibrillation with rapid ventricular response (HCC)- (present on admission)  Assessment & Plan  Confirmed on EKG in ED 10/30/22  [id number] = 5, SQH for now  Metoprolol 25 q8h, titrate to HR <110 and monitor HR/BP in setting of sepsis  Heparin SC for now given esophagitis, significant hemetemesis per patient.  Monitor on telemetry    Other specified anemias  Assessment & Plan  We will check an iron, TIBC, ferritin  Likely chronic blood loss due to history of severe esophagitis, hematemesis  Monitoring CBC    Fall- (present on admission)  Assessment & Plan  Due to severe sepsis and weakness  PT / OT eval    Chronic bilateral low back pain without sciatica- (present on admission)  Assessment & Plan  Likely due to obesity  APAP PRN  NSAIDs contraindicated due to TAO and gastritis  Pain protocol - oxycodone and IV dilaudid PRN  Gabapentin deferred per patient protocol due to odynophagia  Cannot do lidocaine patch due to allergy     Anxiety and depression- (present on admission)  Assessment & Plan  Continue mirtazapine per patient preference  Restart SSRI / buspar when able to tolerate PO better    Esophageal thrush (HCC)- (present on admission)  Assessment & Plan  Visible oral thrush with  reported odynophagia  Likely due to esophagitis and altered mucosal surface  Nystatin swish & swallow    Esophagitis- (present on admission)  Assessment & Plan  EGD 6/14 with Dr. Jo demonstrated severe Grade D esophagitis  Follows with Dr. Dos Santos, s/p dilation 10/6, has not been taking PPI  IV PPI, transition to PO BID when stable  Red flags of weight loss, odynophagia: GI consult - unlikely to need acute intervention  SLP eval for odynophagia  Sucralfate slurry  Checking esophogram.    High anion gap metabolic acidosis- (present on admission)  Assessment & Plan  Due to lactic acidosis and mild DKA  30 cc/kg per sepsis protocol  resolved    Acute respiratory failure with hypoxemia (HCC)- (present on admission)  Assessment & Plan  Likely due to sepsis  CT chest negative for infiltrates, only mild effusions  TTE June 2022 LVEF 70% with LV hypertrophy noted  Likely HFpEF  DDimer ordered was elevated but it could be inflammatory process.  Oxygen saturation has been improving.  Wean oxygen as tolerates to keep SPO2 greater than 88%  If her clinical condition get worse she may need CTA pulmonary.  I am holding to avoid contrast-induced nephropathy in the setting of TAO.  DVT studies pending  TAO make CTA problematic we will reconsider if renal function improves enough.    Diabetic ketoacidosis without coma associated with type 2 diabetes mellitus (HCC)- (present on admission)  Assessment & Plan  Mild  Hyperglycemic, BHB positive, AGMA  Does not meet criteria for insulin gtt, SSI q6h without glargine per intensivist and pharmacist recommendations  Now resolved.  BG's have been mid 100s  Follow AG/CO2    Hematemesis- (present on admission)  Assessment & Plan  Likely due to gastritis and esophagitis seen on EGD 6/15  Hemoglobin remained stable.  IV PPI  sucralfate    Severe sepsis with acute organ dysfunction (HCC)- (present on admission)  Assessment & Plan  This is Severe Sepsis Present on admission  Source is Likely  Gastrointestinal / Esophageal  Sepsis protocol initiated    CT CAP without apparent inflammatory etiology  Likely due to esophagitis  Reports no diarrhea in preceding 5 days, if 3+ BMs daily will order Cdiff  Empiric vancomycin & zosyn at [facility] ED  MRSA nares came back negative and discontinued vancomycin.  Procalcitonin low  Cultures neg  DC Pip/Tazo; start amp/sulbactam.  Plan 5 days total of therapy assuming she cont's to do well    Chest pain- (present on admission)  Assessment & Plan  The ASCVD Risk score (Filemon COOL, et al., 2019) failed to calculate for the following reasons:    The patient has a prior MI or stroke diagnosis  Likely due to esophagitis  Trop 200-300 range which appears to be her baseline on review of previous labs  negative stress test June 2012  EKG without ischemic signs  CXR unremarkable  Telemetry  GI cocktail contraindicated due to lidocaine allergy  IV PPI  Continue on sucralfate    Primary hypertension- (present on admission)  Assessment & Plan  DC amlodipine  Holding ACEI (due to TAO)  Continue metoprolol tartrate 25 mg every 8 hourly for A. Fib.  Continue to monitor.    TAO (acute kidney injury) (HCC)- (present on admission)  Assessment & Plan  Due to severe sepsis and hypotension  CT scan abdomen and pelvis did not show hydronephrosis.  Continuing to treat with IV fluids  Baseline creat 0.9  11/3 BUN:19, Cr:1.17  11/2 BUN: 26, creatinine: 1.52  10/31 BUN: 48, creatinine: 1.91       VTE prophylaxis: SCDs/TEDs and heparin ppx    I have performed a physical exam and reviewed and updated ROS and Plan today (11/3/2022). In review of yesterday's note (11/2/2022), there are no changes except as documented above.

## 2022-11-04 PROBLEM — E11.10 DIABETIC KETOACIDOSIS WITHOUT COMA ASSOCIATED WITH TYPE 2 DIABETES MELLITUS (HCC): Status: RESOLVED | Noted: 2022-06-13 | Resolved: 2022-11-04

## 2022-11-04 PROBLEM — E87.29 HIGH ANION GAP METABOLIC ACIDOSIS: Status: RESOLVED | Noted: 2022-10-30 | Resolved: 2022-11-04

## 2022-11-04 PROBLEM — B37.81 ESOPHAGEAL THRUSH (HCC): Status: RESOLVED | Noted: 2022-10-30 | Resolved: 2022-11-04

## 2022-11-04 PROBLEM — A41.9 SEVERE SEPSIS WITH ACUTE ORGAN DYSFUNCTION (HCC): Status: RESOLVED | Noted: 2022-06-11 | Resolved: 2022-11-04

## 2022-11-04 PROBLEM — I48.91 ATRIAL FIBRILLATION WITH RAPID VENTRICULAR RESPONSE (HCC): Status: RESOLVED | Noted: 2022-10-30 | Resolved: 2022-11-04
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
## 2022-11-04 PROBLEM — R65.20 SEVERE SEPSIS WITH ACUTE ORGAN DYSFUNCTION (HCC): Status: RESOLVED | Noted: 2022-06-11 | Resolved: 2022-11-04

## 2022-11-04 PROBLEM — N17.9 AKI (ACUTE KIDNEY INJURY) (HCC): Status: RESOLVED | Noted: 2022-06-11 | Resolved: 2022-11-04

## 2022-11-04 PROBLEM — K92.0 HEMATEMESIS: Status: RESOLVED | Noted: 2022-06-11 | Resolved: 2022-11-04

## 2022-11-04 PROBLEM — R07.9 CHEST PAIN: Status: RESOLVED | Noted: 2022-06-11 | Resolved: 2022-11-04

## 2022-11-04 LAB
ANION GAP SERPL CALC-SCNC: 9 MMOL/L (ref 7–16)
BACTERIA BLD CULT: NORMAL
BACTERIA BLD CULT: NORMAL
BUN SERPL-MCNC: 18 MG/DL (ref 8–22)
CALCIUM SERPL-MCNC: 8.6 MG/DL (ref 8.5–10.5)
CHLORIDE SERPL-SCNC: 105 MMOL/L (ref 96–112)
CO2 SERPL-SCNC: 25 MMOL/L (ref 20–33)
CREAT SERPL-MCNC: 1.14 MG/DL (ref 0.5–1.4)
EKG IMPRESSION: NORMAL
GFR SERPLBLD CREATININE-BSD FMLA CKD-EPI: 53 ML/MIN/1.73 M 2
GLUCOSE BLD STRIP.AUTO-MCNC: 184 MG/DL (ref 65–99)
GLUCOSE BLD STRIP.AUTO-MCNC: 274 MG/DL (ref 65–99)
GLUCOSE SERPL-MCNC: 113 MG/DL (ref 65–99)
POTASSIUM SERPL-SCNC: 4.8 MMOL/L (ref 3.6–5.5)
SIGNIFICANT IND 70042: NORMAL
SIGNIFICANT IND 70042: NORMAL
SITE SITE: NORMAL
SITE SITE: NORMAL
SODIUM SERPL-SCNC: 139 MMOL/L (ref 135–145)
SOURCE SOURCE: NORMAL
SOURCE SOURCE: NORMAL

## 2022-11-04 PROCEDURE — 99239 HOSP IP/OBS DSCHRG MGMT >30: CPT | Performed by: HOSPITALIST

## 2022-11-04 PROCEDURE — 700111 HCHG RX REV CODE 636 W/ 250 OVERRIDE (IP): Performed by: STUDENT IN AN ORGANIZED HEALTH CARE EDUCATION/TRAINING PROGRAM

## 2022-11-04 PROCEDURE — 36415 COLL VENOUS BLD VENIPUNCTURE: CPT

## 2022-11-04 PROCEDURE — 93010 ELECTROCARDIOGRAM REPORT: CPT | Performed by: INTERNAL MEDICINE

## 2022-11-04 PROCEDURE — A9270 NON-COVERED ITEM OR SERVICE: HCPCS | Performed by: STUDENT IN AN ORGANIZED HEALTH CARE EDUCATION/TRAINING PROGRAM

## 2022-11-04 PROCEDURE — 82962 GLUCOSE BLOOD TEST: CPT | Mod: 91

## 2022-11-04 PROCEDURE — A9270 NON-COVERED ITEM OR SERVICE: HCPCS | Performed by: HOSPITALIST

## 2022-11-04 PROCEDURE — 80048 BASIC METABOLIC PNL TOTAL CA: CPT

## 2022-11-04 PROCEDURE — 700102 HCHG RX REV CODE 250 W/ 637 OVERRIDE(OP): Performed by: STUDENT IN AN ORGANIZED HEALTH CARE EDUCATION/TRAINING PROGRAM

## 2022-11-04 PROCEDURE — 93005 ELECTROCARDIOGRAM TRACING: CPT | Performed by: HOSPITALIST

## 2022-11-04 PROCEDURE — 700102 HCHG RX REV CODE 250 W/ 637 OVERRIDE(OP): Performed by: HOSPITALIST

## 2022-11-04 RX ORDER — OMEPRAZOLE 20 MG/1
20 CAPSULE, DELAYED RELEASE ORAL 2 TIMES DAILY
Qty: 60 CAPSULE | Refills: 1 | Status: SHIPPED | OUTPATIENT
Start: 2022-11-04 | End: 2022-12-04

## 2022-11-04 RX ORDER — SUCRALFATE ORAL 1 G/10ML
1 SUSPENSION ORAL EVERY 6 HOURS
Qty: 560 ML | Refills: 0 | Status: SHIPPED | OUTPATIENT
Start: 2022-11-04 | End: 2022-11-18

## 2022-11-04 RX ADMIN — METOPROLOL TARTRATE 25 MG: 25 TABLET, FILM COATED ORAL at 05:21

## 2022-11-04 RX ADMIN — OMEPRAZOLE 20 MG: 20 CAPSULE, DELAYED RELEASE ORAL at 05:21

## 2022-11-04 RX ADMIN — NYSTATIN 500000 UNITS: 100000 SUSPENSION ORAL at 12:46

## 2022-11-04 RX ADMIN — HEPARIN SODIUM 5000 UNITS: 5000 INJECTION, SOLUTION INTRAVENOUS; SUBCUTANEOUS at 05:21

## 2022-11-04 RX ADMIN — NYSTATIN 500000 UNITS: 100000 SUSPENSION ORAL at 08:42

## 2022-11-04 RX ADMIN — OXYCODONE HYDROCHLORIDE 10 MG: 10 TABLET ORAL at 03:54

## 2022-11-04 RX ADMIN — INSULIN HUMAN 7 UNITS: 100 INJECTION, SOLUTION PARENTERAL at 12:47

## 2022-11-04 RX ADMIN — OXYCODONE HYDROCHLORIDE 10 MG: 10 TABLET ORAL at 15:50

## 2022-11-04 RX ADMIN — HEPARIN SODIUM 5000 UNITS: 5000 INJECTION, SOLUTION INTRAVENOUS; SUBCUTANEOUS at 12:47

## 2022-11-04 RX ADMIN — METOPROLOL TARTRATE 25 MG: 25 TABLET, FILM COATED ORAL at 12:46

## 2022-11-04 RX ADMIN — SUCRALFATE 1 G: 1 SUSPENSION ORAL at 05:20

## 2022-11-04 RX ADMIN — OXYCODONE HYDROCHLORIDE 10 MG: 10 TABLET ORAL at 12:47

## 2022-11-04 RX ADMIN — OXYCODONE HYDROCHLORIDE 10 MG: 10 TABLET ORAL at 08:42

## 2022-11-04 RX ADMIN — SUCRALFATE 1 G: 1 SUSPENSION ORAL at 12:46

## 2022-11-04 ASSESSMENT — PAIN DESCRIPTION - PAIN TYPE
TYPE: ACUTE PAIN

## 2022-11-04 NOTE — DOCUMENTATION QUERY
"                                                                         [facility]                                                                       Query Response Note      PATIENT:               TJ LUCERO  ACCT #:                  [id number]  MRN:                     [id number]  :                      1955  ADMIT DATE:       10/30/2022 6:46 AM  DISCH DATE:          RESPONDING  PROVIDER #:        [id number]           QUERY TEXT:    Malnutrition is documented in the Medical Record.  After further review, please specify the severity.  NOTE:  If an appropriate response is not listed below, please respond with a new note.    The patient's Clinical Indicators include:  Contact me with questions.     Thank you,  Ju Ortiz, FNP, CDI  [email or web address]    66yo with dx of sepsis, DM2, afib, CKD     RD note \" Malnutrition Risk: Pt meets ASPEN criteria for moderate malnutrition in the context of social or environmental circumstances R/T poor nutrition quality of life secondary to swallowing difficultly AEB 13% severe weight loss in 5 months and <75% estimated energy requirements >/= 3 months.\"    Risk factors: DM2, TAO on CKD, diastolic heart failure, chronic pain  Treatments: RD consultation, Magic Cups BID, monitor  Options provided:   -- Mild protein calorie malnutrition   -- Moderate protein calorie malnutrition   -- Severe protein calorie malnutrition   -- Other explantion:Other explanation-, please specify   -- Unable to determine      Query created by: Ju Ortiz on 2022 5:44 AM    RESPONSE TEXT:    Moderate protein calorie malnutrition          Electronically signed by:  NATALIE VILLARREAL MD 11/3/2022 10:36 PM              "

## 2022-11-04 NOTE — CARE PLAN
The patient is   Problem: Knowledge Deficit - Standard  Goal: Patient and family/care givers will demonstrate understanding of plan of care, disease process/condition, diagnostic tests and medications  Outcome: Met     Problem: Respiratory  Goal: Patient will achieve/maintain optimum respiratory ventilation and gas exchange  Outcome: Met     Problem: Physical Regulation  Goal: Diagnostic test results will improve  Outcome: Met  Goal: Signs and symptoms of infection will decrease  Outcome: Met     Problem: Pain - Standard  Goal: Alleviation of pain or a reduction in pain to the patient’s comfort goal  Outcome: Met     Problem: Skin Integrity  Goal: Skin integrity is maintained or improved  Outcome: Met     Problem: Fall Risk  Goal: Patient will remain free from falls  Outcome: Met     Problem: Depression  Goal: Patient and family/caregiver will verbalize accurate information about at least two of the possible causes of depression, three-four of the signs and symptoms of depression  Outcome: Met     Problem: Provide Safe Environment  Goal: Suicide environmental safety, protocols, policies, and practices will be implemented  Outcome: Met     Problem: Psychosocial  Goal: Patient's ability to identify and develop effective coping behaviors will improve  Outcome: Met  Goal: Patient's ability to identify and utilize available support systems will improve  Outcome: Met       Shift Goals  Clinical Goals:  (Pain management)  Patient Goals:  (Rest)  Family Goals: FRANCISCO    Progress made toward(s) clinical / shift goals:  Met    Patient is not progressing towards the following goals:

## 2022-11-04 NOTE — DISCHARGE SUMMARY
Discharge Summary    CHIEF COMPLAINT ON ADMISSION  Chief Complaint   Patient presents with    Irregular Heart Beat     Transfer from [facility] fib RVR, hypotension, epigastric pain    Hypotension     Septic protocol ordered for Jemison       Reason for Admission  Irregular heartbeat and hypotension    Admission Date  10/30/2022    CODE STATUS  DNAR/DNI    HPI & HOSPITAL COURSE  Asmita Willis is a 67 y.o. female with a history of diabetes, hypertension, chronic pain, and seizure disorder that was admitted 10/30/2022 with new diagnosis of atrial fibrillation with a rapid ventricular heart rate that was diagnosed at [facility] and transferred here to [facility].  Also concerning for sepsis with leukocytosis and abdominal/chest pain.  Also of significance patient had been admitted to [facility] for abdominal pain Nisha 15, 2022 and underwent an EGD and found to have severe esophagitis/gastritis and duodenitis.  Per notes the patient had a esophageal dilation in [facility] in October 6th this year.  Per initial history and physical patient has had a 70 pound weight loss over the last 2 months due to difficulty taking any orals due to a dyne aphasia.  She had not been taking any of her oral medications except mirtazapine at night.  There is also reports she had been having diarrhea before hospitalization.  Unfortunately patient states she has not been able to take sure oral medications at home due to difficulty swallowing pills.  Since this admission in the hospital she has had improvement of her swallowing abilities and we discussed the importance of being on proton pump inhibitor as well as her Carafate for ongoing healing of her esophagitis.  She states she had been having episodes of hematemesis and coughing up blood from her throat.     Patient was initiated on IV Zosyn IV fluids and IV diltiazem and transferred to [facility].  Patient was admitted for severe sepsis unclear etiology concerning  for possible bacteremia given her GI history.  At [facility] she was given digoxin and aborted her atrial fibrillation.  Patient did receive IV fluid bolus and infusions and was initially transferred to the stepdown unit.  During the patient's hospitalization her blood and urine cultures remain negative to date.  Review of the patient's past EGD from June 2022 showed inflamed granulation tissue with reactive change in the architecture was distorted raising the possibility of hyperplastic polyp however intraoperative correlation was requested.  No evidence otherwise of intestinal metaplasia, dysplasia or malignancy.  During the patient's hospitalization hemoglobin hematocrit remained relatively stable.  On 11/3 her hemoglobin was 9.9.  She had a significant improvement throughout her hospitalization with initial 10/30 WBC of 27.3 and 11/3 WBC: 11.5.  Patient remained afebrile.  There is a question of sepsis due to to her esophagitis.  Patient did have findings of iron deficiency and was initiated on sorbic acid as well as iron.  A barium swallow was performed during hospitalization showing presbyesophagus, GERD.  Patient was seen by speech therapy patient was placed on puréed foods.  She was given Carafate for ongoing odynophagia.  She could not be given a GI cocktail due to lidocaine allergies.  The patient was initiated on metoprolol 25 mg 3 times daily.  She had conversion back to sinus rhythm during hospitalization.  Due to her recent hematemesis decision was made to hold current anticoagulation until taking further oral medications successfully without any additional signs of significant GI bleeding.  Psychiatry did consult patient was on Remeron 50 mg nightly.  Discussion with the patient in regards to her atrial fibrillation and anticoagulation with risk factors of acute stroke were discussed with her.  Decision was made to hold anticoagulation until her esophagitis has been improving and no further signs of  hematemesis.  I have recommended her to follow-up with her primary care promptly for this.  She is also recommended follow-up with GI in [facility] in the next month.  We discussed medical compliance and recommended return to the hospital if any acute worsening of symptoms.  Patient is on home oxygen her family is bringing a tank to help transport her back to [place].    Therefore, she is discharged in good and stable condition to home with close outpatient follow-up.    The patient met 2-midnight criteria for an inpatient stay at the time of discharge.    Discharge Date  11/04/22      FOLLOW UP ITEMS POST DISCHARGE  None    DISCHARGE DIAGNOSES  Principal Problem (Resolved):    Atrial fibrillation with rapid ventricular response (HCC) POA: Yes  Active Problems:    Primary hypertension POA: Yes    Acute respiratory failure with hypoxemia ([facility]) POA: Yes    Esophagitis POA: Yes    Anxiety and depression POA: Yes    Chronic bilateral low back pain without sciatica POA: Yes    Other specified anemias POA: Unknown  Resolved Problems:    TAO (acute kidney injury) ([facility]) POA: Yes    Chest pain POA: Yes    Severe sepsis with acute organ dysfunction ([facility]) POA: Yes    Hematemesis POA: Yes    Diabetic ketoacidosis without coma associated with type 2 diabetes mellitus ([facility]) POA: Yes    High anion gap metabolic acidosis POA: Yes    Esophageal thrush (HCC) POA: Yes    Fall POA: Yes      FOLLOW UP  No future appointments.  No follow-up provider specified.    MEDICATIONS ON DISCHARGE     Medication List        START taking these medications        Instructions   metoprolol tartrate 25 MG Tabs  Commonly known as: LOPRESSOR   Take 1 Tablet by mouth every 8 hours for 30 days.  Dose: 25 mg     omeprazole 20 MG delayed-release capsule  Commonly known as: PRILOSEC   Take 1 Capsule by mouth 2 times a day for 30 days.  Dose: 20 mg     sucralfate 1 GM/10ML Susp  Commonly known as: CARAFATE   Take 10 mL by mouth every 6 hours for 14  days.  Dose: 1 g            CHANGE how you take these medications        Instructions   insulin  UNIT/ML Susp  What changed: how much to take  Commonly known as: HumuLIN/NovoLIN   Inject 10 Units under the skin 2 times a day.  Dose: 10 Units            CONTINUE taking these medications        Instructions   insulin regular human  Commonly known as: HUMULIN/NOVOLIN R   Inject 10 Units under the skin 3 times a day with meals.  Dose: 10 Units     mirtazapine 15 MG Tabs  Commonly known as: Remeron   Take 15 mg by mouth every evening.  Dose: 15 mg              Allergies  Allergies   Allergen Reactions    Lidocaine Anaphylaxis    Novocain Anaphylaxis       DIET  Orders Placed This Encounter   Procedures    Diet Order Diet: Level 4 - Pureed (float small/crush large pills in applesauce); Liquid level: Level 0 - Thin     Standing Status:   Standing     Number of Occurrences:   1     Order Specific Question:   Diet:     Answer:   Level 4 - Pureed [25]     Comments:   float small/crush large pills in applesauce     Order Specific Question:   Liquid level     Answer:   Level 0 - Thin       ACTIVITY  As tolerated.  Weight bearing as tolerated    CONSULTATIONS  Psychiatry    PROCEDURES  Barium swallow    LABORATORY  Lab Results   Component Value Date    SODIUM 139 11/04/2022    POTASSIUM 4.8 11/04/2022    CHLORIDE 105 11/04/2022    CO2 25 11/04/2022    GLUCOSE 113 (H) 11/04/2022    BUN 18 11/04/2022    CREATININE 1.14 11/04/2022        Lab Results   Component Value Date    WBC 11.5 (H) 11/03/2022    HEMOGLOBIN 9.9 (L) 11/03/2022    HEMATOCRIT 31.9 (L) 11/03/2022    PLATELETCT 299 11/03/2022      DX-ESOPHAGUS BARIUM SWALLOW SINGLE CONTRAST   Final Result      1.  Presbyesophagus   2.  Gastroesophageal reflux   3.  Consider speech pathology evaluation      US-EXTREMITY VENOUS LOWER BILAT   Final Result      CT-CHEST,ABDOMEN,PELVIS WITH   Final Result   Addendum (preliminary) 1 of 1   Additional history is provided that she  has esophagitis and there is    recent esophageal dilation.      There is no mediastinal air. Therefore, there is no evidence for rupture.      The esophagus does appear to have wall thickening consistent with    inflammation.      Final      1.  No inflammatory process identified within the chest, abdomen, pelvis      2.  Mild intrahepatic dilation, nonspecific      3.  Mild bilateral atelectasis and small bilateral pleural effusion      4.  Marked left ventricular hypertrophy      5.  Small kidney consistent with sequela of chronic process      6.  Splenic calcification consistent with granuloma      7.  Enlarged uterus containing an 8.3 cm mass consistent with a fibroid      DX-CHEST-PORTABLE (1 VIEW)   Final Result         1.  No acute cardiopulmonary abnormality.   2.  Prominent pericardial fat again noted.            Total time of the discharge process exceeds 31 minutes.

## 2022-11-04 NOTE — CARE PLAN
Problem: Knowledge Deficit - Standard  Goal: Patient and family/care givers will demonstrate understanding of plan of care, disease process/condition, diagnostic tests and medications  Outcome: Progressing     Problem: Respiratory  Goal: Patient will achieve/maintain optimum respiratory ventilation and gas exchange  Outcome: Progressing     Problem: Physical Regulation  Goal: Diagnostic test results will improve  Outcome: Progressing     Problem: Pain - Standard  Goal: Alleviation of pain or a reduction in pain to the patient’s comfort goal  Outcome: Progressing     Problem: Skin Integrity  Goal: Skin integrity is maintained or improved  Outcome: Progressing     Problem: Fall Risk  Goal: Patient will remain free from falls  Outcome: Progressing     Problem: Depression  Goal: Patient and family/caregiver will verbalize accurate information about at least two of the possible causes of depression, three-four of the signs and symptoms of depression  Outcome: Progressing     Problem: Provide Safe Environment  Goal: Suicide environmental safety, protocols, policies, and practices will be implemented  Outcome: Progressing     Problem: Psychosocial  Goal: Patient's ability to identify and develop effective coping behaviors will improve  Outcome: Progressing   The patient is Stable - Low risk of patient condition declining or worsening    Shift Goals  Clinical Goals: pain management, rest  Patient Goals: rest  Family Goals: FRANCISCO

## 2022-11-04 NOTE — DISCHARGE PLANNING
Case Management Discharge Planning    Admission Date: 10/30/2022  GMLOS: 5  ALOS: 5    6-Clicks ADL Score: 17  6-Clicks Mobility Score: 21  PT and/or OT Eval ordered: yes  Post-acute Referrals Ordered: yes  Post-acute Choice Obtained: yes  Has referral(s) been sent to post-acute provider:  yes      Anticipated Discharge Dispo: Discharge Disposition: D/T to home under A care in anticipation of covered skilled care (06)    [facility] has accepted        DME Needed: [facility] will deliver a tank for discharge.    Action(s) Taken: Discussed with IDT and per Dr. Tobar , pt is clear for discharge. Discussed IMM for pt and she is agreeable to go home. Her daughter Radha will .     Escalations Completed: n/a    Medically Clear: yes    Next Steps: make sure pt has O2 upon discharge.     Barriers to Discharge: none    Is the patient up for discharge tomorrow: today

## 2022-11-04 NOTE — DIETARY
Nutrition Services: Update   Day 5 of admit.  Asmita Willis is a 67 y.o. female with admitting DX of A-fib     Problem: Nutritional:  Goal: Achieve adequate nutritional intake  Description: Patient will consume >50% of meals  Outcome: Met    Pt is currently on pureed diet with thin liquids. PO consistently >50% for meals charted per ADLs. Pt likely meeting nutrition needs at this time.     Malnutrition Risk: see diagnosis 11/1    Recommendations/Plan:  Encourage intake of meals  Document intake of all meals as % taken in ADL's to provide interdisciplinary communication across all shifts.   Monitor weight.  Nutrition rep will continue to see patient for ongoing meal and snack preferences.    RD to monitor weekly per department policy, please re-consult as indicated

## 2022-11-04 NOTE — DISCHARGE INSTRUCTIONS
Sepsis, Diagnosis, Adult  Sepsis is a serious bodily reaction to an infection. The infection that triggers sepsis may be from a bacteria, virus, or fungus. Sepsis can result from an infection in any part of your body. Infections that commonly lead to sepsis include skin, lung, and urinary tract infections.  Sepsis is a medical emergency that must be treated right away in a hospital. In severe cases, it can lead to septic shock. Septic shock can weaken your heart and cause your blood pressure to drop. This can cause your central nervous system and your body's organs to stop working.  What are the causes?  This condition is caused by a severe reaction to infections from bacteria, viruses, or fungus. The germs that most often lead to sepsis include:  Escherichia coli (E. coli) bacteria.  Staphylococcus aureus (staph) bacteria.  Some types of Streptococcus bacteria.  The most common infections affect these organs:  The lung (pneumonia).  The kidneys or bladder (urinary tract infection).  The skin (cellulitis).  The bowel, gallbladder, or pancreas.  What increases the risk?  You are more likely to develop this condition if:  Your body's disease-fighting system (immune system) is weakened.  You are age 65 or older.  You are male.  You had surgery or you have been hospitalized.  You have these devices inserted into your body:  A small, thin tube (catheter).  IV line.  Breathing tube.  Drainage tube.  You are not getting enough nutrients from food (malnourished).  You have a long-term (chronic) disease, such as cancer, lung disease, kidney disease, or diabetes.  You are .  What are the signs or symptoms?  Symptoms of this condition may include:  Fever.  Chills or feeling very cold.  Confusion or anxiety.  Fatigue.  Muscle aches.  Shortness of breath.  Nausea and vomiting.  Urinating much less than usual.  Fast heart rate (tachycardia).  Rapid breathing (hyperventilation).  Changes in skin color. Your skin  may look blotchy, pale, or blue.  Cool, clammy, or sweaty skin.  Skin rash.  Other symptoms depend on the source of your infection.  How is this diagnosed?  This condition is diagnosed based on:  Your symptoms.  Your medical history.  A physical exam.  Other tests may also be done to find out the cause of the infection and how severe the sepsis is. These tests may include:  Blood tests.  Urine tests.  Swabs from other areas of your body that may have an infection. These samples may be tested (cultured) to find out what type of bacteria is causing the infection.  Chest X-ray to check for pneumonia. Other imaging tests, such as a CT scan, may also be done.  Lumbar puncture. This removes a small amount of the fluid that surrounds your brain and spinal cord. The fluid is then examined for infection.  How is this treated?  This condition must be treated in a hospital. Based on the cause of your infection, you may be given an antibiotic, antiviral, or antifungal medicine.  You may also receive:  Fluids through an IV.  Oxygen and breathing assistance.  Medicines to increase your blood pressure.  Kidney dialysis. This process cleans your blood if your kidneys have failed.  Surgery to remove infected tissue.  Blood transfusion if needed.  Medicine to prevent blood clots.  Nutrients to correct imbalances in basic body function (metabolism). You may:  Receive important salts and minerals (electrolytes) through an IV.  Have your blood sugar level adjusted.  Follow these instructions at home:  Medicines    Take over-the-counter and prescription medicines only as told by your health care provider.  If you were prescribed an antibiotic, antiviral, or antifungal medicine, take it as told by your health care provider. Do not stop taking the medicine even if you start to feel better.  General instructions  If you have a catheter or other indwelling device, ask to have it removed as soon as possible.  Keep all follow-up visits as told  by your health care provider. This is important.  Contact a health care provider if:  You do not feel like you are getting better or regaining strength.  You are having trouble coping with your recovery.  You frequently feel tired.  You feel worse or do not seem to get better after surgery.  You think you may have an infection after surgery.  Get help right away if:  You have any symptoms of sepsis.  You have difficulty breathing.  You have a rapid or skipping heartbeat.  You become confused or disoriented.  You have a high fever.  Your skin becomes blotchy, pale, or blue.  You have an infection that is getting worse or not getting better.  These symptoms may represent a serious problem that is an emergency. Do not wait to see if the symptoms will go away. Get medical help right away. Call your local emergency services (911 in the [place] Do not drive yourself to the hospital.  Summary  Sepsis is a medical emergency that requires immediate treatment in a hospital.  This condition is caused by a severe reaction to infections from bacteria, viruses, or fungus.  Based on the cause of your infection, you may be given an antibiotic, antiviral, or antifungal medicine.  Treatment may also include IV fluids, breathing assistance, and kidney dialysis.  This information is not intended to replace advice given to you by your health care provider. Make sure you discuss any questions you have with your health care provider.  Document Released: 09/15/2004 Document Revised: 07/26/2019 Document Reviewed: 07/26/2019  [employer] Patient Education © 2020 [employer]  Atrial Fibrillation    Atrial fibrillation is a type of heartbeat that is irregular or fast (rapid). If you have this condition, your heart beats without any order. This makes it hard for your heart to pump blood in a normal way. Having this condition gives you more risk for stroke, heart failure, and other heart problems.  Atrial fibrillation may start all of a sudden and  then stop on its own, or it may become a long-lasting problem.  What are the causes?  This condition may be caused by heart conditions, such as:  High blood pressure.  Heart failure.  Heart valve disease.  Heart surgery.  Other causes include:  Pneumonia.  Obstructive sleep apnea.  Lung cancer.  Thyroid disease.  Drinking too much alcohol.  Sometimes the cause is not known.  What increases the risk?  You are more likely to develop this condition if:  You smoke.  You are older.  You have diabetes.  You are overweight.  You have a family history of this condition.  You exercise often and hard.  What are the signs or symptoms?  Common symptoms of this condition include:  A feeling like your heart is beating very fast.  Chest pain.  Feeling short of breath.  Feeling light-headed or weak.  Getting tired easily.  Follow these instructions at home:  Medicines  Take over-the-counter and prescription medicines only as told by your doctor.  If your doctor gives you a blood-thinning medicine, take it exactly as told. Taking too much of it can cause bleeding. Taking too little of it does not protect you against clots. Clots can cause a stroke.  Lifestyle         Do not use any tobacco products. These include cigarettes, chewing tobacco, and e-cigarettes. If you need help quitting, ask your doctor.  Do not drink alcohol.  Do not drink beverages that have caffeine. These include coffee, soda, and tea.  Follow diet instructions as told by your doctor.  Exercise regularly as told by your doctor.  General instructions  If you have a condition that causes breathing to stop for a short period of time (apnea), treat it as told by your doctor.  Keep a healthy weight. Do not use diet pills unless your doctor says they are safe for you. Diet pills may make heart problems worse.  Keep all follow-up visits as told by your doctor. This is important.  Contact a doctor if:  You notice a change in the speed, rhythm, or strength of your  "heartbeat.  You are taking a blood-thinning medicine and you see more bruising.  You get tired more easily when you move or exercise.  You have a sudden change in weight.  Get help right away if:    You have pain in your chest or your belly (abdomen).  You have trouble breathing.  You have blood in your vomit, poop, or pee (urine).  You have any signs of a stroke. \"BE FAST\" is an easy way to remember the main warning signs:  B - Balance. Signs are dizziness, sudden trouble walking, or loss of balance.  E - Eyes. Signs are trouble seeing or a change in how you see.  F - Face. Signs are sudden weakness or loss of feeling in the face, or the face or eyelid drooping on one side.  A - Arms. Signs are weakness or loss of feeling in an arm. This happens suddenly and usually on one side of the body.  S - Speech. Signs are sudden trouble speaking, slurred speech, or trouble understanding what people say.  T - Time. Time to call emergency services. Write down what time symptoms started.  You have other signs of a stroke, such as:  A sudden, very bad headache with no known cause.  Feeling sick to your stomach (nausea).  Throwing up (vomiting).  Jerky movements you cannot control (seizure).  These symptoms may be an emergency. Do not wait to see if the symptoms will go away. Get medical help right away. Call your local emergency services (911 in the [place] Do not drive yourself to the hospital.  Summary  Atrial fibrillation is a type of heartbeat that is irregular or fast (rapid).  You are at higher risk of this condition if you smoke, are older, have diabetes, or are overweight.  Follow your doctor's instructions about medicines, diet, exercise, and follow-up visits.  Get help right away if you think that you have signs of a stroke.  This information is not intended to replace advice given to you by your health care provider. Make sure you discuss any questions you have with your health care provider.  Document Released: " 09/26/2009 Document Revised: 02/21/2019 Document Reviewed: 02/08/2019  [employer] Patient Education © 2020 [employer]  Diabetic Ketoacidosis  Diabetic ketoacidosis is a serious complication of diabetes. This condition develops when there is not enough insulin in the body. Insulin is an hormone that regulates blood sugar levels in the body. Normally, insulin allows glucose to enter the cells in the body. The cells break down glucose for energy. Without enough insulin, the body cannot break down glucose, so it breaks down fats instead. This leads to high blood glucose levels in the body and the production of acids that are called ketones. Ketones are poisonous at high levels.  If diabetic ketoacidosis is not treated, it can cause severe dehydration and can lead to a coma or death.  What are the causes?  This condition develops when a lack of insulin causes the body to break down fats instead of glucose. This may be triggered by:  Stress on the body. This stress is brought on by an illness.  Infection.  Medicines that raise blood glucose levels.  Not taking diabetes medicine.  New onset of type 1 diabetes mellitus.  What are the signs or symptoms?  Symptoms of this condition include:  Fatigue.  Weight loss.  Excessive thirst.  Light-headedness.  Fruity or sweet-smelling breath.  Excessive urination.  Vision changes.  Confusion or irritability.  Nausea.  Vomiting.  Rapid breathing.  Abdominal pain.  Feeling flushed.  How is this diagnosed?  This condition is diagnosed based on your medical history, a physical exam, and blood tests. You may also have a urine test to check for ketones.  How is this treated?  This condition may be treated with:  Fluid replacement. This may be done to correct dehydration.  Insulin injections. These may be given through the skin or through an IV tube.  Electrolyte replacement. Electrolytes are minerals in your blood. Electrolytes such as potassium and sodium may be given in pill form or  through an IV tube.  Antibiotic medicines. These may be prescribed if your condition was caused by an infection.  Diabetic ketoacidosis is a serious medical condition. You may need emergency treatment in the hospital to monitor your condition.  Follow these instructions at home:  Eating and drinking  Drink enough fluids to keep your urine clear or pale yellow.  If you are not able to eat, drink clear fluids in small amounts as you are able. Clear fluids include water, ice chips, fruit juice with water added (diluted), and low-calorie sports drinks. You may also have sugar-free jello or popsicles.  If you are able to eat, follow your usual diet and drink sugar-free liquids, such as water.  Medicines  Take over-the-counter and prescription medicines only as told by your health care provider.  Continue to take insulin and other diabetes medicines as told by your health care provider.  If you were prescribed an antibiotic, take it as told by your health care provider. Do not stop taking the antibiotic even if you start to feel better.  General instructions    Check your urine for ketones when you are ill and as told by your health care provider.  If your blood glucose is 240 mg/dL (13.3 mmol/L) or higher, check your urine ketones every 4-6 hours.  Check your blood glucose every day, as often as told by your health care provider.  If your blood glucose is high, drink plenty of fluids. This helps to flush out ketones.  If your blood glucose is above your target for 2 tests in a row, contact your health care provider.  Carry a medical alert card or wear medical alert jewelry that says that you have diabetes.  Rest and exercise only as told by your health care provider. Do not exercise when your blood glucose is high and you have ketones in your urine.  If you get sick, call your health care provider and begin treatment quickly. Your body often needs extra insulin to fight an illness. Check your blood glucose every 4-6  hours when you are sick.  Keep all follow-up visits as told by your health care provider. This is important.  Contact a health care provider if:  Your blood glucose level is higher than 240 mg/dL (13.3 mmol/L) for 2 days in a row.  You have moderate or large ketones in your urine.  You have a fever.  You cannot eat or drink without vomiting.  You have been vomiting for more than 2 hours.  You continue to have symptoms of diabetic ketoacidosis.  You develop new symptoms.  Get help right away if:  Your blood glucose monitor reads “high” even when you are taking insulin.  You faint.  You have chest pain.  You have trouble breathing.  You have sudden trouble speaking or swallowing.  You have vomiting or diarrhea that gets worse after 3 hours.  You are unable to stay awake.  You have trouble thinking.  You are severely dehydrated. Symptoms of severe dehydration include:  Extreme thirst.  Dry mouth.  Rapid breathing.  These symptoms may represent a serious problem that is an emergency. Do not wait to see if the symptoms will go away. Get medical help right away. Call your local emergency services (911 in the [place] Do not drive yourself to the hospital.  Summary  Diabetic ketoacidosis is a serious complication of diabetes. This condition develops when there is not enough insulin in the body.  This condition is diagnosed based on your medical history, a physical exam, and blood tests. You may also have a urine test to check for ketones.  Diabetic ketoacidosis is a serious medical condition. You may need emergency treatment in the hospital to monitor your condition.  Contact your health care provider if your blood glucose is higher than 240 mg/dl for 2 days in a row or if you have moderate or large ketones in your urine.  This information is not intended to replace advice given to you by your health care provider. Make sure you discuss any questions you have with your health care provider.  Document Released: 12/15/2001  Document Revised: 02/02/2018 Document Reviewed: 01/22/2018  [employer] Patient Education © 2020 [employer]  Dysphagia Eating Plan, Pureed  This diet is helpful for people with moderate to severe swallowing problems. Pureed foods are smooth and are prepared without lumps so that they can be swallowed safely. Work with your health care provider and your diet and nutrition specialist (dietitian) to make sure that you are following the diet safely and getting all the nutrients you need.  What are tips for following this plan?  General instructions  You may eat foods that are soft and have a pudding-like texture.  Do not eat foods that you have to chew. If you have to chew the food, then you cannot eat it.  Avoid foods that are hard, dry, sticky, chunky, lumpy, or stringy. Also avoid foods with nuts, seeds, raisins, skins, or pulp.  You may be instructed to thicken liquids. Follow your health care provider's instructions about how to do this and to what consistency.  Cooking    If a food is not originally a smooth texture, you may be able to eat the food after:  Pureeing it. This can be done with a .  Moistening it. This can be done by adding juice, cooking liquid, gravy, or sauce to a dry food and then pureeing it. For example, you may have bread if you soak it in milk and puree it.  If a food is too thin, you may add a commercial thickener, corn starch, rice cereal, or potato flakes to thicken it.  Strain and throw away any liquid that separates from a solid pureed food before eating.  Strain lumps, chunks, pulp, and seeds from pureed foods before eating.  Reheat foods slowly to prevent a tough crust from forming.  Meal planning  Eat a variety of foods to get all the nutrients you need.  Add dry milk or protein powder to food to increase calories and protein content.  Follow your meal plan as told by your dietitian.  What foods are allowed?  The items listed may not be a complete list. Talk with your dietitian  about what dietary choices are best for you.  Grains  Soft breads, pancakes, [place] toast, muffins, and bread stuffing pureed to a smooth, moist texture, without nuts or seeds. Cooked cereals that have a pudding-like consistency, such as cream of wheat or farina. Pureed oatmeal. Pureed, well-cooked pasta and rice.  Vegetables  Pureed vegetables. Smooth tomato paste or sauce. Mashed or pureed potatoes without skin.  Fruits  Pureed fruits such as melons and apples without seeds or pulp. Mashed bananas. Mashed avocado. Fruit juices without pulp or seeds.  Meats and other protein foods  Pureed meat, poultry, and fish. Smooth messina or liverwurst. Smooth souffles. Pureed beans (such as lentils). Pureed eggs. Smooth nut and seed butters. Pureed tofu.  Dairy  Yogurt. Milk. Pureed cottage cheese. Nutritional dairy drinks or shakes. Cream cheese. Smooth pudding, ice cream, sherbet, and malts.  Fats and oils  Butter. Margarine. Vegetable oils. Smooth and strained gravy. Sour cream. Mayonnaise. Smooth sauces such as white sauce, cheese sauce, or hollandaise sauce.  Sweets and desserts  Moistened and pureed cookies and cakes. Whipped topping. Gelatin. Pudding pops.  Seasoning and other foods  Finely ground spices. Jelly. Honey. Pureed casseroles. Strained soups. Pureed sandwiches.  Beverages  Anything prepared at the consistency recommended by your dietitian.  What foods are not allowed?  The items listed may not be a complete list. Talk with your dietitian about what dietary choices are best for you.  Grains  Oatmeal. Dry cereals. Hard breads. Breads with seeds or nuts. Whole pasta, rice, or other grains. Whole pancakes, waffles, biscuits, muffins, or rolls.  Vegetables  Whole vegetables. Stringy vegetables (such as celery). Tomatoes or tomato sauce with seeds. Fried vegetables.  Fruits  Whole fresh, frozen, canned, or dried fruits that have not been pureed. Stringy fruits, such as pineapple or coconut. Watermelon with seeds.  Dried fruit or fruit leather.  Meat and other protein foods  Whole or ground meat, fish, or poultry. Dried or cooked lentils or legumes that have been cooked but not mashed or pureed. Non-pureed eggs. Nuts and seeds. Crunchy peanut butter. Whole tofu or other meat alternatives.  Dairy  Cheese cubes or slices. Non-pureed cottage cheese. Yogurt with fruit chunks.  Fats and oils  All fats and sauces that have lumps or chunks.  Sweets and desserts  Solid desserts. Sticky, chewy sweets (such as licorice and caramel). Candy with nuts or coconut.  Seasoning and other foods  Coarse or seeded herbs and spices. [place] preserves. Jams with seeds. Whole sandwiches. Non-pureed casseroles. [place] soups.  Summary  Pureed foods can be helpful for people with moderate to severe swallowing problems.  On the dysphagia eating plan, you may eat foods that are soft and have a pudding-like texture. You should avoid foods that you have to chew. If you have to chew the food, then you cannot eat it.  You may be instructed to thicken liquids. Follow your health care provider's instructions about how to do this and to what consistency.  This information is not intended to replace advice given to you by your health care provider. Make sure you discuss any questions you have with your health care provider.  Document Released: 12/18/2006 Document Revised: 04/09/2020 Document Reviewed: 02/20/2018  [employer] Patient Education © 2020 [employer]

## 2022-11-04 NOTE — PROGRESS NOTES
Report received, pt care assumed, tele box on. VSS, pt assessment complete. Pt aaox4, no signs of distress noted at this time. POC discussed with pt and verbalizes no questions.  Pt denies any additional needs at this time. Bed in lowest position, bed alarm on, pt educated on fall risk and verbalized understanding, call light within reach..

## 2022-11-04 NOTE — PROGRESS NOTES
Pt given discharge instructions. Discussed diet, activity, follow up appointments, symptoms and management, and prescriptions provided.  Packet sent with patient.  IV d/c'd, tele box off, and all questions answered.

## 2022-11-05 LAB — GLUCOSE BLD STRIP.AUTO-MCNC: 133 MG/DL (ref 65–99)

## 2023-01-10 VITALS
DIASTOLIC BLOOD PRESSURE: 54 MMHG | TEMPERATURE: 97.7 F | RESPIRATION RATE: 20 BRPM | HEART RATE: 64 BPM | OXYGEN SATURATION: 93 % | BODY MASS INDEX: 33.66 KG/M2 | WEIGHT: 209.44 LBS | HEIGHT: 66 IN | SYSTOLIC BLOOD PRESSURE: 133 MMHG

## 2023-01-10 ASSESSMENT — FIBROSIS 4 INDEX: FIB4 SCORE: 0.87

## 2023-01-11 NOTE — PROGRESS NOTES
Lakhwinder Officer note    Pt with Hx of gastroparesis and was in the Hospital in [place] with a recurrence of this after being there last week with the same thing.  Hgb on admission 12.  Vomitted twice with coffee ground emesis on both occasions and repeat Hgb 9.  No blood thinners beyong enoxaparin prophylaxis  VSS  Has had EGD in the last 2 months showing gastritis

## 2023-11-28 ENCOUNTER — APPOINTMENT (OUTPATIENT)
Dept: CARDIOLOGY | Facility: MEDICAL CENTER | Age: 68
DRG: 683 | End: 2023-11-28
Attending: STUDENT IN AN ORGANIZED HEALTH CARE EDUCATION/TRAINING PROGRAM
Payer: MEDICARE

## 2023-11-28 ENCOUNTER — HOSPITAL ENCOUNTER (INPATIENT)
Facility: MEDICAL CENTER | Age: 68
LOS: 1 days | DRG: 683 | End: 2023-11-29
Attending: STUDENT IN AN ORGANIZED HEALTH CARE EDUCATION/TRAINING PROGRAM | Admitting: STUDENT IN AN ORGANIZED HEALTH CARE EDUCATION/TRAINING PROGRAM
Payer: MEDICARE

## 2023-11-28 ENCOUNTER — APPOINTMENT (OUTPATIENT)
Dept: RADIOLOGY | Facility: MEDICAL CENTER | Age: 68
DRG: 683 | End: 2023-11-28
Payer: MEDICARE

## 2023-11-28 ENCOUNTER — APPOINTMENT (OUTPATIENT)
Dept: RADIOLOGY | Facility: MEDICAL CENTER | Age: 68
DRG: 683 | End: 2023-11-28
Attending: STUDENT IN AN ORGANIZED HEALTH CARE EDUCATION/TRAINING PROGRAM
Payer: MEDICARE

## 2023-11-28 DIAGNOSIS — R11.2 INTRACTABLE NAUSEA AND VOMITING: ICD-10-CM

## 2023-11-28 PROBLEM — R13.10 DYSPHAGIA: Status: ACTIVE | Noted: 2023-11-28

## 2023-11-28 PROBLEM — N18.9 ACUTE KIDNEY INJURY SUPERIMPOSED ON CKD (HCC): Status: ACTIVE | Noted: 2022-06-11

## 2023-11-28 PROBLEM — I13.10 CARDIORENAL SYNDROME: Status: ACTIVE | Noted: 2023-11-28

## 2023-11-28 PROBLEM — N17.9 AKI (ACUTE KIDNEY INJURY) (HCC): Status: ACTIVE | Noted: 2023-11-28

## 2023-11-28 PROBLEM — J96.11 CHRONIC RESPIRATORY FAILURE WITH HYPOXIA (HCC): Status: ACTIVE | Noted: 2023-11-28

## 2023-11-28 PROBLEM — F39 MOOD DISORDER (HCC): Status: ACTIVE | Noted: 2023-11-28

## 2023-11-28 PROBLEM — R79.89 ELEVATED TROPONIN: Status: ACTIVE | Noted: 2023-11-28

## 2023-11-28 PROBLEM — Z71.89 ACP (ADVANCE CARE PLANNING): Status: ACTIVE | Noted: 2023-11-28

## 2023-11-28 LAB
ALBUMIN SERPL BCP-MCNC: 3.8 G/DL (ref 3.2–4.9)
ALBUMIN SERPL BCP-MCNC: 4 G/DL (ref 3.2–4.9)
ALBUMIN/GLOB SERPL: 1.5 G/DL
ALP SERPL-CCNC: 73 U/L (ref 30–99)
ALT SERPL-CCNC: 11 U/L (ref 2–50)
ANION GAP SERPL CALC-SCNC: 12 MMOL/L (ref 7–16)
APPEARANCE UR: CLEAR
AST SERPL-CCNC: 15 U/L (ref 12–45)
BACTERIA #/AREA URNS HPF: NEGATIVE /HPF
BASOPHILS # BLD AUTO: 0.2 % (ref 0–1.8)
BASOPHILS # BLD: 0.02 K/UL (ref 0–0.12)
BILIRUB SERPL-MCNC: 0.8 MG/DL (ref 0.1–1.5)
BILIRUB UR QL STRIP.AUTO: NEGATIVE
BUN SERPL-MCNC: 36 MG/DL (ref 8–22)
BUN SERPL-MCNC: 37 MG/DL (ref 8–22)
CALCIUM ALBUM COR SERPL-MCNC: 9.5 MG/DL (ref 8.5–10.5)
CALCIUM ALBUM COR SERPL-MCNC: 9.5 MG/DL (ref 8.5–10.5)
CALCIUM SERPL-MCNC: 9.3 MG/DL (ref 8.5–10.5)
CALCIUM SERPL-MCNC: 9.5 MG/DL (ref 8.5–10.5)
CHLORIDE SERPL-SCNC: 103 MMOL/L (ref 96–112)
CHLORIDE SERPL-SCNC: 103 MMOL/L (ref 96–112)
CHOLEST SERPL-MCNC: 169 MG/DL (ref 100–199)
CO2 SERPL-SCNC: 24 MMOL/L (ref 20–33)
CO2 SERPL-SCNC: 25 MMOL/L (ref 20–33)
COLOR UR: YELLOW
CREAT SERPL-MCNC: 2.23 MG/DL (ref 0.5–1.4)
CREAT SERPL-MCNC: 2.35 MG/DL (ref 0.5–1.4)
CREAT UR-MCNC: 121.23 MG/DL
CRP SERPL HS-MCNC: 0.47 MG/DL (ref 0–0.75)
D DIMER PPP IA.FEU-MCNC: 0.75 UG/ML (FEU) (ref 0–0.5)
EKG IMPRESSION: NORMAL
EOSINOPHIL # BLD AUTO: 0 K/UL (ref 0–0.51)
EOSINOPHIL NFR BLD: 0 % (ref 0–6.9)
EPI CELLS #/AREA URNS HPF: ABNORMAL /HPF
ERYTHROCYTE [DISTWIDTH] IN BLOOD BY AUTOMATED COUNT: 49.1 FL (ref 35.9–50)
EST. AVERAGE GLUCOSE BLD GHB EST-MCNC: 171 MG/DL
GFR SERPLBLD CREATININE-BSD FMLA CKD-EPI: 22 ML/MIN/1.73 M 2
GFR SERPLBLD CREATININE-BSD FMLA CKD-EPI: 23 ML/MIN/1.73 M 2
GLOBULIN SER CALC-MCNC: 2.6 G/DL (ref 1.9–3.5)
GLUCOSE BLD STRIP.AUTO-MCNC: 184 MG/DL (ref 65–99)
GLUCOSE BLD STRIP.AUTO-MCNC: 207 MG/DL (ref 65–99)
GLUCOSE SERPL-MCNC: 218 MG/DL (ref 65–99)
GLUCOSE SERPL-MCNC: 223 MG/DL (ref 65–99)
GLUCOSE UR STRIP.AUTO-MCNC: NEGATIVE MG/DL
HBA1C MFR BLD: 7.6 % (ref 4–5.6)
HCT VFR BLD AUTO: 34.7 % (ref 37–47)
HDLC SERPL-MCNC: 81 MG/DL
HGB BLD-MCNC: 11.4 G/DL (ref 12–16)
HYALINE CASTS #/AREA URNS LPF: ABNORMAL /LPF
IMM GRANULOCYTES # BLD AUTO: 0.04 K/UL (ref 0–0.11)
IMM GRANULOCYTES NFR BLD AUTO: 0.4 % (ref 0–0.9)
INR PPP: 1.14 (ref 0.87–1.13)
KETONES UR STRIP.AUTO-MCNC: NEGATIVE MG/DL
LACTATE SERPL-SCNC: 1.1 MMOL/L (ref 0.5–2)
LDLC SERPL CALC-MCNC: 74 MG/DL
LEUKOCYTE ESTERASE UR QL STRIP.AUTO: NEGATIVE
LV EJECT FRACT  99904: 65
LV EJECT FRACT MOD 2C 99903: 59.38
LV EJECT FRACT MOD 4C 99902: 69.67
LV EJECT FRACT MOD BP 99901: 66.09
LYMPHOCYTES # BLD AUTO: 2.26 K/UL (ref 1–4.8)
LYMPHOCYTES NFR BLD: 20.7 % (ref 22–41)
MAGNESIUM SERPL-MCNC: 1.9 MG/DL (ref 1.5–2.5)
MCH RBC QN AUTO: 28.9 PG (ref 27–33)
MCHC RBC AUTO-ENTMCNC: 32.9 G/DL (ref 32.2–35.5)
MCV RBC AUTO: 87.8 FL (ref 81.4–97.8)
MICRO URNS: ABNORMAL
MONOCYTES # BLD AUTO: 0.94 K/UL (ref 0–0.85)
MONOCYTES NFR BLD AUTO: 8.6 % (ref 0–13.4)
NEUTROPHILS # BLD AUTO: 7.66 K/UL (ref 1.82–7.42)
NEUTROPHILS NFR BLD: 70.1 % (ref 44–72)
NITRITE UR QL STRIP.AUTO: NEGATIVE
NRBC # BLD AUTO: 0 K/UL
NRBC BLD-RTO: 0 /100 WBC (ref 0–0.2)
NT-PROBNP SERPL IA-MCNC: ABNORMAL PG/ML (ref 0–125)
PH UR STRIP.AUTO: 5.5 [PH] (ref 5–8)
PHOSPHATE SERPL-MCNC: 3.5 MG/DL (ref 2.5–4.5)
PHOSPHATE SERPL-MCNC: 4.1 MG/DL (ref 2.5–4.5)
PLATELET # BLD AUTO: 230 K/UL (ref 164–446)
PMV BLD AUTO: 10.3 FL (ref 9–12.9)
POTASSIUM SERPL-SCNC: 4.7 MMOL/L (ref 3.6–5.5)
POTASSIUM SERPL-SCNC: 4.9 MMOL/L (ref 3.6–5.5)
PROT SERPL-MCNC: 6.6 G/DL (ref 6–8.2)
PROT UR QL STRIP: 100 MG/DL
PROTHROMBIN TIME: 14.8 SEC (ref 12–14.6)
RBC # BLD AUTO: 3.95 M/UL (ref 4.2–5.4)
RBC # URNS HPF: ABNORMAL /HPF
RBC UR QL AUTO: NEGATIVE
SODIUM SERPL-SCNC: 139 MMOL/L (ref 135–145)
SODIUM SERPL-SCNC: 139 MMOL/L (ref 135–145)
SODIUM UR-SCNC: 71 MMOL/L
SP GR UR STRIP.AUTO: 1.02
TRIGL SERPL-MCNC: 70 MG/DL (ref 0–149)
TROPONIN T SERPL-MCNC: 42 NG/L (ref 6–19)
TROPONIN T SERPL-MCNC: 48 NG/L (ref 6–19)
UROBILINOGEN UR STRIP.AUTO-MCNC: 0.2 MG/DL
WBC # BLD AUTO: 10.9 K/UL (ref 4.8–10.8)
WBC #/AREA URNS HPF: ABNORMAL /HPF

## 2023-11-28 PROCEDURE — 85610 PROTHROMBIN TIME: CPT

## 2023-11-28 PROCEDURE — 82570 ASSAY OF URINE CREATININE: CPT

## 2023-11-28 PROCEDURE — 85379 FIBRIN DEGRADATION QUANT: CPT

## 2023-11-28 PROCEDURE — 84484 ASSAY OF TROPONIN QUANT: CPT | Mod: 91

## 2023-11-28 PROCEDURE — 96372 THER/PROPH/DIAG INJ SC/IM: CPT

## 2023-11-28 PROCEDURE — 93306 TTE W/DOPPLER COMPLETE: CPT | Mod: 26 | Performed by: INTERNAL MEDICINE

## 2023-11-28 PROCEDURE — 85025 COMPLETE CBC W/AUTO DIFF WBC: CPT

## 2023-11-28 PROCEDURE — 93306 TTE W/DOPPLER COMPLETE: CPT

## 2023-11-28 PROCEDURE — 93005 ELECTROCARDIOGRAM TRACING: CPT | Performed by: STUDENT IN AN ORGANIZED HEALTH CARE EDUCATION/TRAINING PROGRAM

## 2023-11-28 PROCEDURE — 700102 HCHG RX REV CODE 250 W/ 637 OVERRIDE(OP): Performed by: STUDENT IN AN ORGANIZED HEALTH CARE EDUCATION/TRAINING PROGRAM

## 2023-11-28 PROCEDURE — 700111 HCHG RX REV CODE 636 W/ 250 OVERRIDE (IP): Mod: JZ | Performed by: STUDENT IN AN ORGANIZED HEALTH CARE EDUCATION/TRAINING PROGRAM

## 2023-11-28 PROCEDURE — 83735 ASSAY OF MAGNESIUM: CPT

## 2023-11-28 PROCEDURE — 96375 TX/PRO/DX INJ NEW DRUG ADDON: CPT

## 2023-11-28 PROCEDURE — A9270 NON-COVERED ITEM OR SERVICE: HCPCS

## 2023-11-28 PROCEDURE — 99497 ADVNCD CARE PLAN 30 MIN: CPT | Performed by: STUDENT IN AN ORGANIZED HEALTH CARE EDUCATION/TRAINING PROGRAM

## 2023-11-28 PROCEDURE — 78452 HT MUSCLE IMAGE SPECT MULT: CPT

## 2023-11-28 PROCEDURE — 86140 C-REACTIVE PROTEIN: CPT

## 2023-11-28 PROCEDURE — 71045 X-RAY EXAM CHEST 1 VIEW: CPT

## 2023-11-28 PROCEDURE — 770020 HCHG ROOM/CARE - TELE (206)

## 2023-11-28 PROCEDURE — 83605 ASSAY OF LACTIC ACID: CPT

## 2023-11-28 PROCEDURE — 700105 HCHG RX REV CODE 258: Performed by: STUDENT IN AN ORGANIZED HEALTH CARE EDUCATION/TRAINING PROGRAM

## 2023-11-28 PROCEDURE — 80053 COMPREHEN METABOLIC PANEL: CPT

## 2023-11-28 PROCEDURE — 96374 THER/PROPH/DIAG INJ IV PUSH: CPT

## 2023-11-28 PROCEDURE — 80061 LIPID PANEL: CPT

## 2023-11-28 PROCEDURE — 83036 HEMOGLOBIN GLYCOSYLATED A1C: CPT

## 2023-11-28 PROCEDURE — 93010 ELECTROCARDIOGRAM REPORT: CPT | Performed by: INTERNAL MEDICINE

## 2023-11-28 PROCEDURE — 76775 US EXAM ABDO BACK WALL LIM: CPT

## 2023-11-28 PROCEDURE — 84100 ASSAY OF PHOSPHORUS: CPT

## 2023-11-28 PROCEDURE — 82962 GLUCOSE BLOOD TEST: CPT

## 2023-11-28 PROCEDURE — 700111 HCHG RX REV CODE 636 W/ 250 OVERRIDE (IP): Mod: JZ

## 2023-11-28 PROCEDURE — 700102 HCHG RX REV CODE 250 W/ 637 OVERRIDE(OP)

## 2023-11-28 PROCEDURE — 81001 URINALYSIS AUTO W/SCOPE: CPT

## 2023-11-28 PROCEDURE — 99222 1ST HOSP IP/OBS MODERATE 55: CPT | Mod: 25,AI | Performed by: STUDENT IN AN ORGANIZED HEALTH CARE EDUCATION/TRAINING PROGRAM

## 2023-11-28 PROCEDURE — A9270 NON-COVERED ITEM OR SERVICE: HCPCS | Performed by: STUDENT IN AN ORGANIZED HEALTH CARE EDUCATION/TRAINING PROGRAM

## 2023-11-28 PROCEDURE — 700105 HCHG RX REV CODE 258

## 2023-11-28 PROCEDURE — 80069 RENAL FUNCTION PANEL: CPT

## 2023-11-28 PROCEDURE — 83880 ASSAY OF NATRIURETIC PEPTIDE: CPT

## 2023-11-28 PROCEDURE — 84300 ASSAY OF URINE SODIUM: CPT

## 2023-11-28 RX ORDER — SUCRALFATE ORAL 1 G/10ML
1 SUSPENSION ORAL
Status: DISCONTINUED | OUTPATIENT
Start: 2023-11-28 | End: 2023-11-28

## 2023-11-28 RX ORDER — HEPARIN SODIUM 5000 [USP'U]/ML
5000 INJECTION, SOLUTION INTRAVENOUS; SUBCUTANEOUS EVERY 8 HOURS
Status: DISCONTINUED | OUTPATIENT
Start: 2023-11-28 | End: 2023-11-29 | Stop reason: HOSPADM

## 2023-11-28 RX ORDER — SUCRALFATE ORAL 1 G/10ML
1 SUSPENSION ORAL
Status: DISCONTINUED | OUTPATIENT
Start: 2023-11-28 | End: 2023-11-29 | Stop reason: HOSPADM

## 2023-11-28 RX ORDER — SODIUM CHLORIDE 9 MG/ML
INJECTION, SOLUTION INTRAVENOUS CONTINUOUS
Status: DISCONTINUED | OUTPATIENT
Start: 2023-11-28 | End: 2023-11-28

## 2023-11-28 RX ORDER — BUSPIRONE HYDROCHLORIDE 10 MG/1
10 TABLET ORAL 2 TIMES DAILY
Status: DISCONTINUED | OUTPATIENT
Start: 2023-11-28 | End: 2023-11-29 | Stop reason: HOSPADM

## 2023-11-28 RX ORDER — SODIUM CHLORIDE 9 MG/ML
INJECTION, SOLUTION INTRAVENOUS CONTINUOUS
Status: ACTIVE | OUTPATIENT
Start: 2023-11-28 | End: 2023-11-29

## 2023-11-28 RX ORDER — PANTOPRAZOLE SODIUM 40 MG/1
40 TABLET, DELAYED RELEASE ORAL 2 TIMES DAILY
Status: DISCONTINUED | OUTPATIENT
Start: 2023-11-28 | End: 2023-11-28

## 2023-11-28 RX ORDER — QUETIAPINE FUMARATE 100 MG/1
200 TABLET, FILM COATED ORAL NIGHTLY
Status: DISCONTINUED | OUTPATIENT
Start: 2023-11-28 | End: 2023-11-29 | Stop reason: HOSPADM

## 2023-11-28 RX ORDER — ONDANSETRON 4 MG/1
4 TABLET, ORALLY DISINTEGRATING ORAL EVERY 4 HOURS PRN
Status: DISCONTINUED | OUTPATIENT
Start: 2023-11-28 | End: 2023-11-29 | Stop reason: HOSPADM

## 2023-11-28 RX ORDER — URSODIOL 300 MG/1
500 CAPSULE ORAL
Status: DISCONTINUED | OUTPATIENT
Start: 2023-11-28 | End: 2023-11-29 | Stop reason: HOSPADM

## 2023-11-28 RX ORDER — HYDROCODONE BITARTRATE AND ACETAMINOPHEN 5; 325 MG/1; MG/1
1 TABLET ORAL 3 TIMES DAILY PRN
COMMUNITY

## 2023-11-28 RX ORDER — SODIUM CHLORIDE 9 MG/ML
500 INJECTION, SOLUTION INTRAVENOUS ONCE
Status: COMPLETED | OUTPATIENT
Start: 2023-11-28 | End: 2023-11-28

## 2023-11-28 RX ORDER — REGADENOSON 0.08 MG/ML
0.4 INJECTION, SOLUTION INTRAVENOUS ONCE
Status: COMPLETED | OUTPATIENT
Start: 2023-11-28 | End: 2023-11-28

## 2023-11-28 RX ORDER — BUSPIRONE HYDROCHLORIDE 10 MG/1
10 TABLET ORAL 2 TIMES DAILY
COMMUNITY

## 2023-11-28 RX ORDER — ACETAMINOPHEN 325 MG/1
650 TABLET ORAL EVERY 6 HOURS PRN
Status: DISCONTINUED | OUTPATIENT
Start: 2023-11-28 | End: 2023-11-29 | Stop reason: HOSPADM

## 2023-11-28 RX ORDER — QUETIAPINE FUMARATE 200 MG/1
200 TABLET, FILM COATED ORAL
COMMUNITY

## 2023-11-28 RX ORDER — GABAPENTIN 300 MG/1
300 CAPSULE ORAL 2 TIMES DAILY
COMMUNITY

## 2023-11-28 RX ORDER — URSODIOL 300 MG/1
500 CAPSULE ORAL
COMMUNITY

## 2023-11-28 RX ORDER — SUCRALFATE ORAL 1 G/10ML
1 SUSPENSION ORAL
COMMUNITY

## 2023-11-28 RX ORDER — MORPHINE SULFATE 4 MG/ML
2-4 INJECTION INTRAVENOUS
Status: DISCONTINUED | OUTPATIENT
Start: 2023-11-28 | End: 2023-11-29

## 2023-11-28 RX ORDER — ATORVASTATIN CALCIUM 10 MG/1
10 TABLET, FILM COATED ORAL DAILY
Status: DISCONTINUED | OUTPATIENT
Start: 2023-11-28 | End: 2023-11-29 | Stop reason: HOSPADM

## 2023-11-28 RX ORDER — MIRTAZAPINE 15 MG/1
15 TABLET, FILM COATED ORAL NIGHTLY
Status: DISCONTINUED | OUTPATIENT
Start: 2023-11-28 | End: 2023-11-29 | Stop reason: HOSPADM

## 2023-11-28 RX ORDER — PANTOPRAZOLE SODIUM 40 MG/1
40 TABLET, DELAYED RELEASE ORAL 2 TIMES DAILY
COMMUNITY

## 2023-11-28 RX ORDER — BISACODYL 10 MG
10 SUPPOSITORY, RECTAL RECTAL
Status: DISCONTINUED | OUTPATIENT
Start: 2023-11-28 | End: 2023-11-29 | Stop reason: HOSPADM

## 2023-11-28 RX ORDER — ONDANSETRON 2 MG/ML
4 INJECTION INTRAMUSCULAR; INTRAVENOUS EVERY 4 HOURS PRN
Status: DISCONTINUED | OUTPATIENT
Start: 2023-11-28 | End: 2023-11-29 | Stop reason: HOSPADM

## 2023-11-28 RX ORDER — ATORVASTATIN CALCIUM 40 MG/1
40 TABLET, FILM COATED ORAL EVERY EVENING
Status: DISCONTINUED | OUTPATIENT
Start: 2023-11-28 | End: 2023-11-28

## 2023-11-28 RX ORDER — AMOXICILLIN 250 MG
2 CAPSULE ORAL 2 TIMES DAILY
Status: DISCONTINUED | OUTPATIENT
Start: 2023-11-28 | End: 2023-11-29 | Stop reason: HOSPADM

## 2023-11-28 RX ORDER — GUAIFENESIN/DEXTROMETHORPHAN 100-10MG/5
10 SYRUP ORAL EVERY 6 HOURS PRN
Status: DISCONTINUED | OUTPATIENT
Start: 2023-11-28 | End: 2023-11-29 | Stop reason: HOSPADM

## 2023-11-28 RX ORDER — METOCLOPRAMIDE 10 MG/1
5 TABLET ORAL 2 TIMES DAILY
COMMUNITY

## 2023-11-28 RX ORDER — OMEPRAZOLE 20 MG/1
20 CAPSULE, DELAYED RELEASE ORAL 2 TIMES DAILY
Status: DISCONTINUED | OUTPATIENT
Start: 2023-11-28 | End: 2023-11-29 | Stop reason: HOSPADM

## 2023-11-28 RX ORDER — AMINOPHYLLINE 25 MG/ML
INJECTION, SOLUTION INTRAVENOUS
Status: COMPLETED
Start: 2023-11-28 | End: 2023-11-28

## 2023-11-28 RX ORDER — NITROGLYCERIN 0.4 MG/1
0.4 TABLET SUBLINGUAL
Status: DISCONTINUED | OUTPATIENT
Start: 2023-11-28 | End: 2023-11-29 | Stop reason: HOSPADM

## 2023-11-28 RX ORDER — ATORVASTATIN CALCIUM 10 MG/1
10 TABLET, FILM COATED ORAL DAILY
COMMUNITY

## 2023-11-28 RX ORDER — HYDRALAZINE HYDROCHLORIDE 20 MG/ML
10 INJECTION INTRAMUSCULAR; INTRAVENOUS EVERY 4 HOURS PRN
Status: DISCONTINUED | OUTPATIENT
Start: 2023-11-28 | End: 2023-11-29 | Stop reason: HOSPADM

## 2023-11-28 RX ORDER — QUETIAPINE FUMARATE 200 MG/1
200 TABLET, FILM COATED ORAL
Status: DISCONTINUED | OUTPATIENT
Start: 2023-11-28 | End: 2023-11-28

## 2023-11-28 RX ORDER — GABAPENTIN 300 MG/1
300 CAPSULE ORAL 2 TIMES DAILY
Status: DISCONTINUED | OUTPATIENT
Start: 2023-11-28 | End: 2023-11-29 | Stop reason: HOSPADM

## 2023-11-28 RX ORDER — ASPIRIN 81 MG/1
81 TABLET ORAL DAILY
Status: DISCONTINUED | OUTPATIENT
Start: 2023-11-29 | End: 2023-11-29 | Stop reason: HOSPADM

## 2023-11-28 RX ORDER — AMINOPHYLLINE 25 MG/ML
100 INJECTION, SOLUTION INTRAVENOUS
Status: COMPLETED | OUTPATIENT
Start: 2023-11-28 | End: 2023-11-28

## 2023-11-28 RX ORDER — IPRATROPIUM BROMIDE AND ALBUTEROL SULFATE 2.5; .5 MG/3ML; MG/3ML
3 SOLUTION RESPIRATORY (INHALATION)
Status: DISCONTINUED | OUTPATIENT
Start: 2023-11-28 | End: 2023-11-29 | Stop reason: HOSPADM

## 2023-11-28 RX ORDER — POLYETHYLENE GLYCOL 3350 17 G/17G
1 POWDER, FOR SOLUTION ORAL
Status: DISCONTINUED | OUTPATIENT
Start: 2023-11-28 | End: 2023-11-29 | Stop reason: HOSPADM

## 2023-11-28 RX ORDER — SODIUM CHLORIDE, SODIUM LACTATE, POTASSIUM CHLORIDE, AND CALCIUM CHLORIDE .6; .31; .03; .02 G/100ML; G/100ML; G/100ML; G/100ML
500 INJECTION, SOLUTION INTRAVENOUS
Status: DISCONTINUED | OUTPATIENT
Start: 2023-11-28 | End: 2023-11-29 | Stop reason: HOSPADM

## 2023-11-28 RX ORDER — REGADENOSON 0.08 MG/ML
INJECTION, SOLUTION INTRAVENOUS
Status: COMPLETED
Start: 2023-11-28 | End: 2023-11-28

## 2023-11-28 RX ORDER — HYDROCODONE BITARTRATE AND ACETAMINOPHEN 5; 325 MG/1; MG/1
1 TABLET ORAL 3 TIMES DAILY PRN
Status: DISCONTINUED | OUTPATIENT
Start: 2023-11-28 | End: 2023-11-29 | Stop reason: HOSPADM

## 2023-11-28 RX ORDER — METOCLOPRAMIDE HYDROCHLORIDE 5 MG/ML
5 INJECTION INTRAMUSCULAR; INTRAVENOUS EVERY 6 HOURS
Status: DISPENSED | OUTPATIENT
Start: 2023-11-28 | End: 2023-11-29

## 2023-11-28 RX ADMIN — SODIUM CHLORIDE 500 ML: 9 INJECTION, SOLUTION INTRAVENOUS at 04:22

## 2023-11-28 RX ADMIN — AMINOPHYLLINE 1 G: 25 INJECTION, SOLUTION INTRAVENOUS at 14:30

## 2023-11-28 RX ADMIN — OMEPRAZOLE 20 MG: 20 CAPSULE, DELAYED RELEASE ORAL at 18:37

## 2023-11-28 RX ADMIN — ATORVASTATIN CALCIUM 10 MG: 10 TABLET, FILM COATED ORAL at 06:19

## 2023-11-28 RX ADMIN — INSULIN HUMAN 3 UNITS: 100 INJECTION, SOLUTION PARENTERAL at 18:49

## 2023-11-28 RX ADMIN — BUSPIRONE HYDROCHLORIDE 10 MG: 10 TABLET ORAL at 06:20

## 2023-11-28 RX ADMIN — URSODIOL 600 MG: 300 CAPSULE ORAL at 18:37

## 2023-11-28 RX ADMIN — SODIUM CHLORIDE: 9 INJECTION, SOLUTION INTRAVENOUS at 22:01

## 2023-11-28 RX ADMIN — METOPROLOL TARTRATE 25 MG: 25 TABLET, FILM COATED ORAL at 21:47

## 2023-11-28 RX ADMIN — BUSPIRONE HYDROCHLORIDE 10 MG: 10 TABLET ORAL at 18:38

## 2023-11-28 RX ADMIN — GABAPENTIN 300 MG: 300 CAPSULE ORAL at 18:37

## 2023-11-28 RX ADMIN — METOCLOPRAMIDE 5 MG: 5 INJECTION, SOLUTION INTRAMUSCULAR; INTRAVENOUS at 06:19

## 2023-11-28 RX ADMIN — REGADENOSON 0.4 MG: 0.08 INJECTION, SOLUTION INTRAVENOUS at 13:59

## 2023-11-28 RX ADMIN — HEPARIN SODIUM 5000 UNITS: 5000 INJECTION, SOLUTION INTRAVENOUS; SUBCUTANEOUS at 21:47

## 2023-11-28 RX ADMIN — HYDROCODONE BITARTRATE AND ACETAMINOPHEN 1 TABLET: 5; 325 TABLET ORAL at 18:36

## 2023-11-28 RX ADMIN — HEPARIN SODIUM 5000 UNITS: 5000 INJECTION, SOLUTION INTRAVENOUS; SUBCUTANEOUS at 06:19

## 2023-11-28 RX ADMIN — SUCRALFATE 1 G: 1 SUSPENSION ORAL at 18:37

## 2023-11-28 RX ADMIN — OMEPRAZOLE 20 MG: 20 CAPSULE, DELAYED RELEASE ORAL at 06:19

## 2023-11-28 RX ADMIN — DOCUSATE SODIUM 50 MG AND SENNOSIDES 8.6 MG 2 TABLET: 8.6; 5 TABLET, FILM COATED ORAL at 06:19

## 2023-11-28 RX ADMIN — AMINOPHYLLINE 100 MG: 25 INJECTION, SOLUTION INTRAVENOUS at 14:24

## 2023-11-28 RX ADMIN — MIRTAZAPINE 15 MG: 15 TABLET, FILM COATED ORAL at 21:49

## 2023-11-28 RX ADMIN — ONDANSETRON 4 MG: 2 INJECTION INTRAMUSCULAR; INTRAVENOUS at 08:40

## 2023-11-28 RX ADMIN — ONDANSETRON 4 MG: 2 INJECTION INTRAMUSCULAR; INTRAVENOUS at 15:13

## 2023-11-28 RX ADMIN — GABAPENTIN 300 MG: 300 CAPSULE ORAL at 06:19

## 2023-11-28 RX ADMIN — QUETIAPINE FUMARATE 200 MG: 100 TABLET ORAL at 21:49

## 2023-11-28 RX ADMIN — METOCLOPRAMIDE 5 MG: 5 INJECTION, SOLUTION INTRAMUSCULAR; INTRAVENOUS at 18:41

## 2023-11-28 RX ADMIN — HYDROCODONE BITARTRATE AND ACETAMINOPHEN 1 TABLET: 5; 325 TABLET ORAL at 06:30

## 2023-11-28 RX ADMIN — SODIUM CHLORIDE: 9 INJECTION, SOLUTION INTRAVENOUS at 16:40

## 2023-11-28 ASSESSMENT — PATIENT HEALTH QUESTIONNAIRE - PHQ9
1. LITTLE INTEREST OR PLEASURE IN DOING THINGS: NOT AT ALL
SUM OF ALL RESPONSES TO PHQ9 QUESTIONS 1 AND 2: 0
SUM OF ALL RESPONSES TO PHQ9 QUESTIONS 1 AND 2: 0
1. LITTLE INTEREST OR PLEASURE IN DOING THINGS: NOT AT ALL
2. FEELING DOWN, DEPRESSED, IRRITABLE, OR HOPELESS: NOT AT ALL
2. FEELING DOWN, DEPRESSED, IRRITABLE, OR HOPELESS: NOT AT ALL

## 2023-11-28 ASSESSMENT — LIFESTYLE VARIABLES
TOTAL SCORE: 0
HAVE YOU EVER FELT YOU SHOULD CUT DOWN ON YOUR DRINKING: NO
HOW MANY TIMES IN THE PAST YEAR HAVE YOU HAD 5 OR MORE DRINKS IN A DAY: 0
TOTAL SCORE: 0
EVER FELT BAD OR GUILTY ABOUT YOUR DRINKING: NO
TOTAL SCORE: 0
EVER HAD A DRINK FIRST THING IN THE MORNING TO STEADY YOUR NERVES TO GET RID OF A HANGOVER: NO
HAVE PEOPLE ANNOYED YOU BY CRITICIZING YOUR DRINKING: NO
CONSUMPTION TOTAL: NEGATIVE
ALCOHOL_USE: NO
AVERAGE NUMBER OF DAYS PER WEEK YOU HAVE A DRINK CONTAINING ALCOHOL: 0
SUBSTANCE_ABUSE: 0
ON A TYPICAL DAY WHEN YOU DRINK ALCOHOL HOW MANY DRINKS DO YOU HAVE: 0

## 2023-11-28 ASSESSMENT — PAIN DESCRIPTION - PAIN TYPE
TYPE: CHRONIC PAIN

## 2023-11-28 ASSESSMENT — ENCOUNTER SYMPTOMS
DEPRESSION: 0
BLURRED VISION: 0
HEARTBURN: 1
MYALGIAS: 0
COUGH: 0
WEAKNESS: 0
NAUSEA: 1
CHILLS: 0
DIZZINESS: 0
BRUISES/BLEEDS EASILY: 0
WEAKNESS: 1
FEVER: 0
PALPITATIONS: 0
VOMITING: 1
SHORTNESS OF BREATH: 0
HEADACHES: 0
DOUBLE VISION: 0
ABDOMINAL PAIN: 0

## 2023-11-28 ASSESSMENT — FIBROSIS 4 INDEX
FIB4 SCORE: 0.96
FIB4 SCORE: 0.96

## 2023-11-28 NOTE — PROGRESS NOTES
2 RN Skin Assessment Completed by Rica RN and Dena RN.     Head: WDL  Ears: WDL  Nose: WDL  Mouth: WDL  Neck: WDL  Breasts/Chest: WDL  Shoulder Blades: WDL  Spine: WDL  (R) Arm/Elbow/hand: WDL  (L) Arm/Elbow/hand: WDL  Abdomen:WDL  Groin: WDL  Sacrum/Coccyx/Buttocks: blanching  (R) Leg: WDL  (L) Leg: WDL  (R) Heel/Foot/Toe: blanching  (L) Heel/Foot/Toe: blanching      Generalized: fragile, bruising        Devices in place: BP Cuff and Pulse Ox     Interventions in place: Gray ear foams and Pillows     Possible skin injury found: No     Pictures uploaded into Epic: N/A  Wound Consult Placed: N/A

## 2023-11-28 NOTE — PROGRESS NOTES
[facility] Medicine Daily Progress Note    Date of Service  11/28/2023    Chief Complaint  Asmita Willis is a 68 y.o. female admitted 11/28/2023 with elevated troponin    Hospital Course  Asmita Willis is a 68 y.o. female with history of diabetes, gastroparesis, hypertension, seizure disorder, dysphagia s/p g-tube, chronic hypoxic respiratory failure dependent on nocturnal 2-3L. CKD III who presented 11/28/2023 as a direct transfer from [place] for evaluation of elevated troponin.      Patient initially presented to [place] ER with intractable nausea vomiting, started 1 hour prior to ER arrival.  She denies chest pain, shortness of breath. Prior to her symptoms she had just recently restarted PO intake and since then has had epigastric pain and nausea. Work-up from [place] ER notable for WBC 10.3, hemoglobin 15.1, glucose 323, creatinine 1.6, bicarb 23, lactic acid 2.6 >> 2.2, Troponin I 0.06>>0.08. She was given aspirin 324 mg p.o., famotidine 20 mg IV, Zofran 4 mg IV x2, morphine 4 mg IV. Patient was ultimately transferred to [facility] for abnormal troponin results.      Patient was admitted directly to [facility] CDU to medicine service for further evaluation and treatment.     Interval Problem Update  11/28: Nausea has resolved. Denies any chest discomfort or dyspnea. Echocardiogram and stress test pending.     Repeat troponin has improved. Renal function also improved on repeat lab, still elevated above her apparent baseline. As she is asymptomatic, suspect her enzyme elevations may have been secondary to TAO due to nausea/vomiting. N/V likely due to gastroparesis. Will continue to monitor and trend labs, follow cardiac testing results, cautious rehydration, continue with reglan and pepcid.         I have discussed this patient's plan of care and discharge plan at IDT rounds today with Case Management, Nursing, Nursing leadership, and other members of the IDT team.    Consultants/Specialty  none    Code  Status  DNAR/DNI    Disposition  Medically Cleared  I have placed the appropriate orders for post-discharge needs.    Review of Systems  Review of Systems   Constitutional:  Positive for malaise/fatigue. Negative for chills and fever.   HENT:  Negative for hearing loss and tinnitus.    Eyes:  Negative for blurred vision and double vision.   Respiratory:  Negative for cough and shortness of breath.    Cardiovascular:  Negative for chest pain, palpitations and leg swelling.   Gastrointestinal:  Positive for heartburn, nausea and vomiting. Negative for abdominal pain.   Genitourinary:  Negative for dysuria and urgency.   Musculoskeletal:  Negative for joint pain and myalgias.   Skin:  Negative for itching and rash.   Neurological:  Negative for dizziness, weakness and headaches.   Endo/Heme/Allergies:  Negative for environmental allergies. Does not bruise/bleed easily.   Psychiatric/Behavioral:  Negative for depression and substance abuse.    All other systems reviewed and are negative.       Physical Exam  Temp:  [36.6 °C (97.9 °F)-36.7 °C (98.1 °F)] 36.7 °C (98.1 °F)  Pulse:  [58-73] 58  Resp:  [17-18] 18  BP: (116-153)/(69-72) 153/72  SpO2:  [95 %-98 %] 98 %    Physical Exam  Vitals and nursing note reviewed.   Constitutional:       General: She is not in acute distress.  HENT:      Head: Normocephalic and atraumatic.      Nose: Nose normal.      Mouth/Throat:      Mouth: Mucous membranes are moist.      Pharynx: Oropharynx is clear.   Eyes:      General: No scleral icterus.     Extraocular Movements: Extraocular movements intact.   Cardiovascular:      Rate and Rhythm: Normal rate and regular rhythm.      Pulses: Normal pulses.      Heart sounds:      No friction rub.   Pulmonary:      Effort: No respiratory distress.      Breath sounds: No wheezing or rales.   Chest:      Chest wall: No tenderness.   Abdominal:      General: Abdomen is flat.      Palpations: Abdomen is soft.      Tenderness: There is no abdominal  tenderness. There is no guarding or rebound.      Comments: PEG tube in LUQ   Musculoskeletal:         General: Normal range of motion.      Cervical back: Neck supple. No tenderness.      Right lower leg: No edema.      Left lower leg: No edema.   Skin:     General: Skin is warm and dry.      Capillary Refill: Capillary refill takes less than 2 seconds.   Neurological:      General: No focal deficit present.      Mental Status: She is alert and oriented to person, place, and time.   Psychiatric:         Mood and Affect: Mood normal.         Fluids  No intake or output data in the 24 hours ending 11/28/23 1015    Laboratory  Recent Labs     11/28/23 0339   WBC 10.9*   RBC 3.95*   HEMOGLOBIN 11.4*   HEMATOCRIT 34.7*   MCV 87.8   MCH 28.9   MCHC 32.9   RDW 49.1   PLATELETCT 230   MPV 10.3     Recent Labs     11/28/23 0339 11/28/23  0830   SODIUM 139 139   POTASSIUM 4.7 4.9   CHLORIDE 103 103   CO2 24 25   GLUCOSE 218* 223*   BUN 36* 37*   CREATININE 2.35* 2.23*   CALCIUM 9.5 9.3     Recent Labs     11/28/23 0339   INR 1.14*         Recent Labs     11/28/23 0339   TRIGLYCERIDE 70   HDL 81   LDL 74       Imaging  DX-CHEST-LIMITED (1 VIEW)   Final Result      1.  Decreased LEFT basilar atelectasis or airspace disease   2.  Possible small LEFT pleural effusion      US-RENAL   Final Result         1.  Borderline bilateral renal atrophy      EC-ECHOCARDIOGRAM COMPLETE W/O CONT    (Results Pending)   NM-CARDIAC STRESS TEST    (Results Pending)        Assessment/Plan  * Elevated troponin- (present on admission)  Assessment & Plan  Probable demand ischemia  Likely elevated in setting TAO/cardiorenal  ASA/statin  Trend CE, EKG  Check echo, MPS      Mood disorder (HCC)  Assessment & Plan  Continue Seroquel, Remeron    Chronic respiratory failure with hypoxia (HCC)  Assessment & Plan  Dependent on 2 to 3 L at nighttime --at baseline    Dysphagia  Assessment & Plan  S/p PEG  --able to tolerate small PO  --also uses  PEG    Intractable nausea and vomiting  Assessment & Plan  Likely secondary to gastroparesis  Advance diet when appropriate  Trial of Reglan, Pepcid  Supportive antiemetic    Acute kidney injury superimposed on CKD (HCC)- (present on admission)  Assessment & Plan  TAO on CKD III  With recent nausea/vomiting, volume loss  F/u FeNa, renal US  Minimize nephrotoxic agents, renally dose meds    DM (diabetes mellitus) (HCC)- (present on admission)  Assessment & Plan  ISS  statin         VTE prophylaxis:    heparin ppx      I have performed a physical exam and reviewed and updated ROS and Plan today (11/28/2023). In review of yesterday's note (11/27/2023), there are no changes except as documented above.

## 2023-11-28 NOTE — ASSESSMENT & PLAN NOTE
TAO on CKD III  With recent nausea/vomiting, volume loss  F/u FeNa, renal US  Minimize nephrotoxic agents, renally dose meds

## 2023-11-28 NOTE — ASSESSMENT & PLAN NOTE
Goal of care discussed with patient in CDU.  She does not wish for CPR/defibrillation/intubation or mechanical ventilation as needed. She is agreeable for feeding tube as she already has one.  Agreeable for any other noninvasive medical management as needed.  Diagnosis, prognosis, questions and concerns addressed.  Full CODE STATUS confirmed.  ACP: 16 minutes

## 2023-11-28 NOTE — ASSESSMENT & PLAN NOTE
Likely secondary to gastroparesis  Advance diet when appropriate  Trial of Reglan, Pepcid  Supportive antiemetic

## 2023-11-28 NOTE — PROGRESS NOTES
Monitor summary per monitor tech report:    Sinus Rhythm, 1st degree, rate 66-76  No ectopy  .21/.06/.46

## 2023-11-28 NOTE — PROGRESS NOTES
Assessment completed. Pt A&Ox4. Respirations are even and unlabored on 2L n/c. Pt reports chronic back and abdominal pain at this time-medication per MAR. Denies N/V/D at this time. Monitors applied, VS stable, call light and belongings within reach. POC updated (echo, tele monitoring). Pt educated on room and call light, pt verbalized understanding. Communication board updated. Needs met. NPO at this time.

## 2023-11-28 NOTE — HOSPITAL COURSE
Asmita Willis is a 68 y.o. female with history of diabetes, gastroparesis, hypertension, seizure disorder, dysphagia s/p g-tube, chronic hypoxic respiratory failure dependent on nocturnal 2-3L. CKD III who presented 11/28/2023 as a direct transfer from [place] for evaluation of elevated troponin.      Patient initially presented to [place] ER with intractable nausea vomiting, started 1 hour prior to ER arrival.  She denies chest pain, shortness of breath. Prior to her symptoms she had just recently restarted PO intake and since then has had epigastric pain and nausea. Work-up from [place] ER notable for WBC 10.3, hemoglobin 15.1, glucose 323, creatinine 1.6, bicarb 23, lactic acid 2.6 >> 2.2, Troponin I 0.06>>0.08. She was given aspirin 324 mg p.o., famotidine 20 mg IV, Zofran 4 mg IV x2, morphine 4 mg IV. Patient was ultimately transferred to [facility] for abnormal troponin results.      Patient was admitted directly to [facility] CDU to medicine service for further evaluation and treatment. Echocardiogram with LVEF 65% and normal regional wall motion, no significant valvular abnormalities. Pharmacologic nuclear stress test revealed no evidence of significant jeopardized viable myocardium or prior myocardial infarction. Normal left ventricular size, ejection fraction, and wall motion. Negative stress ECG for ischemia. She also had a cough, but on room air and afebrile. Flu/Covid swab negative.    Suspect troponin elevation due to TAO on CKD due to volume depletion. Renal ultrasound without evidence of obstruction, did note bilateral borderline atrophy. Treated with IV fluids with subsequent significant improvement in creatinine from 2.35 > 1.64. On arrival to [facility] her gastrostomy tube was incidentally found to be clogged and was thus successfully exchanged for a new 18 Fr tube by Dr. Carvajal in IR.     On 11/29, patient states she is feeling well and ready to go home, tolerating fluids well without nausea. She will  be discharged home to follow up with her regular PCP. Provided her with prescription for Zofran ODT, and educated on return to ED parameters.

## 2023-11-28 NOTE — PROGRESS NOTES
[facility] DIRECT ADMISSION REPORT  Transferring facility: [facility]  Transferring physician: PA Butler    Chief complaint: Abdominal pain  Pertinent history & patient course:   68-year-old female with a past medical history of atrial fibrillation, hypertension, diabetes, seizure disorder, GERD with severe esophagitis/gastritis and duodenitis s/p esophageal dilation in the past, she has had an NG tube in place for several months due to this.  Reportedly she has been allowed to start some p.o. intake recently, since then in the past day she been complaining of epigastric pain and left upper quadrant abdominal pain associated with nausea.  No bowel or urinary symptoms, no fever/chills, no chest pain.    Vitals on presentation, afebrile and hemodynamically stable.  Labs showed creatinine of 1.6 (baseline in our system 0.9-1.2), lactic acid 2.6.  She was given IV fluids and antibiotics.  Initial troponin was 0.006 and repeat was 0.08.  Again, no chest pain.  EKG not concerning for acute ischemia and appears similar to prior.    Transfer request for further cardiology evaluation due to rising troponin.    The admitting provider is the point of contact for questions or concerns regarding patient's care.

## 2023-11-28 NOTE — PROGRESS NOTES
Chart reviewed to find size of original PEG.     18 [place] gastrostomy tube was placed on 4/8/23 by Dr. Goodwin.      Addendum: IR notified. Procedure will likely be scheduled for tomorrow.

## 2023-11-28 NOTE — H&P
Hospital Medicine History & Physical Note    Date of Service  11/28/2023    Primary Care Physician  Lilian Sheriff N.P.    Consultants  None    Code Status  DNAR/DNI    Chief Complaint  No chief complaint on file.  Intractable nausea vomiting    History of Presenting Illness  Asmita Willis is a 68 y.o. female with history of diabetes, gastroparesis, hypertension, seizure disorder, dysphagia s/p g-tube, chronic hypoxic respiratory failure dependent on nocturnal 2-3L. CKD III who presented 11/28/2023 as a direct transfer from [place] for evaluation of elevated troponin.  Patient initially presented to [place] ER with intractable nausea vomiting, started 1 hour prior to ER arrival.  She denies chest pain, shortness of breath.    Work-up from [place] ER included:  CBC: WBC 10.3, hemoglobin 15.1, platelet 285  CMP: Glucose 323, BUN 31, creatinine 1.6, EGFR 35, sodium 140, potassium 4.3, chloride 103, bicarb 23, calcium 11.0, total protein 8.2, albumin 4.9, total bilirubin 1.3, AST 20, ALT 21, alkaline phosphatase 128  Venous lactic acid 2.6 >> 2.2  Troponin I 0.06>>0.08    She was given aspirin 324 mg p.o., famotidine 20 mg IV, Zofran 4 mg IV x2, morphine 4 mg IV  Patient was ultimately transferred to [facility] for abnormal troponin results.  Patient was seen by me in [facility] CDU,  admitted to medicine service for further evaluation and treatment. Repeat labs has been ordered.  No acute distress at time of evaluation.    I discussed the plan of care with patient, bedside RN, and pharmacy.    Review of Systems  Review of Systems   Constitutional:  Positive for malaise/fatigue. Negative for chills and fever.   HENT:  Negative for hearing loss and tinnitus.    Eyes:  Negative for blurred vision and double vision.   Respiratory:  Negative for cough and shortness of breath.    Cardiovascular:  Negative for chest pain, palpitations and leg swelling.   Gastrointestinal:  Positive for heartburn, nausea and vomiting. Negative for  abdominal pain.   Genitourinary:  Negative for dysuria and urgency.   Musculoskeletal:  Negative for joint pain and myalgias.   Skin:  Negative for itching and rash.   Neurological:  Positive for weakness. Negative for dizziness and headaches.   Endo/Heme/Allergies:  Negative for environmental allergies. Does not bruise/bleed easily.   Psychiatric/Behavioral:  Negative for depression and substance abuse.    All other systems reviewed and are negative.      Past Medical History   has a past medical history of Diabetes, Hypertension, and Seizure disorder (HCC).    Surgical History   has a past surgical history that includes pr upper gi endoscopy,diagnosis (N/A, 6/15/2022) and pr colonoscopy,diagnostic (N/A, 6/15/2022).     Family History  family history includes Diabetes in her mother and sister; Hypertension in her brother and father.   Family history reviewed with patient. There is family history that is pertinent to the chief complaint.     Social History   reports that she quit smoking about 6 years ago. Her smoking use included cigarettes. She started smoking about 57 years ago. She has a 102.0 pack-year smoking history. She has never used smokeless tobacco. She reports that she does not currently use alcohol. She reports current drug use. Drug: Inhaled.    Allergies  Allergies   Allergen Reactions    Lidocaine Anaphylaxis    Novocain Anaphylaxis       Medications  Prior to Admission Medications   Prescriptions Last Dose Informant Patient Reported? Taking?   insulin NPH (HUMULIN/NOVOLIN) 100 UNIT/ML Suspension   No No   Sig: Inject 10 Units under the skin 2 times a day.   insulin regular human (HUMULIN/NOVOLIN R)   Yes No   Sig: Inject 10 Units under the skin 3 times a day with meals.   mirtazapine (REMERON) 15 MG Tab   Yes No   Sig: Take 15 mg by mouth every evening.      Facility-Administered Medications: None       Physical Exam  Temp:  [36.6 °C (97.9 °F)] 36.6 °C (97.9 °F)  Pulse:  [73] 73  Resp:  [17]  17  BP: (116)/(69) 116/69  SpO2:  [95 %] 95 %                          Physical Exam  Vitals and nursing note reviewed.   Constitutional:       General: She is not in acute distress.  HENT:      Head: Normocephalic and atraumatic.      Nose: Nose normal.      Mouth/Throat:      Mouth: Mucous membranes are moist.      Pharynx: Oropharynx is clear.   Eyes:      General: No scleral icterus.     Extraocular Movements: Extraocular movements intact.   Cardiovascular:      Rate and Rhythm: Normal rate and regular rhythm.      Pulses: Normal pulses.      Heart sounds:      No friction rub.   Pulmonary:      Effort: No respiratory distress.      Breath sounds: No wheezing or rales.   Chest:      Chest wall: No tenderness.   Abdominal:      General: There is distension.      Tenderness: There is no abdominal tenderness. There is no guarding or rebound.      Comments: PEG tube in LUQ   Musculoskeletal:         General: Normal range of motion.      Cervical back: Neck supple. No tenderness.      Right lower leg: No edema.      Left lower leg: No edema.   Skin:     General: Skin is warm and dry.      Capillary Refill: Capillary refill takes less than 2 seconds.   Neurological:      General: No focal deficit present.      Mental Status: She is alert and oriented to person, place, and time.   Psychiatric:         Mood and Affect: Mood normal.         Laboratory:  Recent Labs     11/28/23 0339   WBC 10.9*   RBC 3.95*   HEMOGLOBIN 11.4*   HEMATOCRIT 34.7*   MCV 87.8   MCH 28.9   MCHC 32.9   RDW 49.1   PLATELETCT 230   MPV 10.3     Recent Labs     11/28/23 0339   SODIUM 139   POTASSIUM 4.7   CHLORIDE 103   CO2 24   GLUCOSE 218*   BUN 36*   CREATININE 2.35*   CALCIUM 9.5     Recent Labs     11/28/23 0339   ALTSGPT 11   ASTSGOT 15   ALKPHOSPHAT 73   TBILIRUBIN 0.8   GLUCOSE 218*     Recent Labs     11/28/23 0339   INR 1.14*     Recent Labs     11/28/23 0339   NTPROBNP 33949*     Recent Labs     11/28/23 0339   TRIGLYCERIDE 70    HDL 81   LDL 74     Recent Labs     11/28/23  0339   TROPONINT 48*       Imaging:  EC-ECHOCARDIOGRAM COMPLETE W/O CONT    (Results Pending)   DX-CHEST-LIMITED (1 VIEW)    (Results Pending)   US-RENAL    (Results Pending)   NM-CARDIAC STRESS TEST    (Results Pending)       X-Ray:  I have personally reviewed the images and compared with prior images.  EKG:  I have personally reviewed the images and compared with prior images.    Assessment/Plan:  Justification for Admission Status  I anticipate this patient is appropriate for observation status at this time.        * Elevated troponin- (present on admission)  Assessment & Plan  Probable demand ischemia  Likely elevated in setting TAO/cardiorenal  ASA/statin  Trend CE, EKG  Check echo, MPS      Dysphagia  Assessment & Plan  S/p PEG  --able to tolerate small PO  --also uses PEG    Intractable nausea and vomiting  Assessment & Plan  Likely secondary to gastroparesis  Advance diet when appropriate  Trial of Reglan, Pepcid  Supportive antiemetic    Cardiorenal syndrome  Assessment & Plan  Suspected  Gentle diuresis  Monitor renal function while on diuretic  F/u FeNa, renal US    Acute kidney injury superimposed on CKD (HCC)- (present on admission)  Assessment & Plan  TAO on CKD III  Probable cardiorenal  Minimize nephrotoxic agents, renally dose meds    DM (diabetes mellitus) (HCC)- (present on admission)  Assessment & Plan  ISS  statin    Mood disorder (HCC)  Assessment & Plan  Continue Seroquel Remeron    ACP (advance care planning)  Assessment & Plan  Goal of care discussed with patient in CDU.  She does not wish for CPR/defibrillation/intubation or mechanical ventilation as needed. She is agreeable for feeding tube as she already has one.  Agreeable for any other noninvasive medical management as needed.  Diagnosis, prognosis, questions and concerns addressed.  Full CODE STATUS confirmed.  ACP: 16 minutes    Chronic respiratory failure with hypoxia (HCC)  Assessment  & Plan  Dependent on 2 to 3 L at nighttime --at baseline        VTE prophylaxis: heparin ppx

## 2023-11-28 NOTE — ASSESSMENT & PLAN NOTE
Probable demand ischemia  Likely elevated in setting TAO/cardiorenal  ASA/statin  Trend CE, EKG  Check echo, MPS

## 2023-11-29 ENCOUNTER — APPOINTMENT (OUTPATIENT)
Dept: RADIOLOGY | Facility: MEDICAL CENTER | Age: 68
DRG: 683 | End: 2023-11-29
Payer: MEDICARE

## 2023-11-29 VITALS
OXYGEN SATURATION: 92 % | DIASTOLIC BLOOD PRESSURE: 69 MMHG | BODY MASS INDEX: 26.18 KG/M2 | TEMPERATURE: 97.6 F | RESPIRATION RATE: 16 BRPM | HEART RATE: 68 BPM | SYSTOLIC BLOOD PRESSURE: 145 MMHG | HEIGHT: 66 IN | WEIGHT: 162.92 LBS

## 2023-11-29 PROBLEM — N17.9 AKI (ACUTE KIDNEY INJURY) (HCC): Status: RESOLVED | Noted: 2023-11-28 | Resolved: 2023-11-29

## 2023-11-29 PROBLEM — N18.9 ACUTE KIDNEY INJURY SUPERIMPOSED ON CKD (HCC): Status: RESOLVED | Noted: 2022-06-11 | Resolved: 2023-11-29

## 2023-11-29 PROBLEM — N17.9 ACUTE KIDNEY INJURY SUPERIMPOSED ON CKD (HCC): Status: RESOLVED | Noted: 2022-06-11 | Resolved: 2023-11-29

## 2023-11-29 PROBLEM — R11.2 INTRACTABLE NAUSEA AND VOMITING: Status: RESOLVED | Noted: 2023-11-28 | Resolved: 2023-11-29

## 2023-11-29 PROBLEM — R79.89 ELEVATED TROPONIN: Status: RESOLVED | Noted: 2023-11-28 | Resolved: 2023-11-29

## 2023-11-29 LAB
ANION GAP SERPL CALC-SCNC: 12 MMOL/L (ref 7–16)
BUN SERPL-MCNC: 35 MG/DL (ref 8–22)
CALCIUM SERPL-MCNC: 9.1 MG/DL (ref 8.5–10.5)
CHLORIDE SERPL-SCNC: 106 MMOL/L (ref 96–112)
CO2 SERPL-SCNC: 22 MMOL/L (ref 20–33)
CREAT SERPL-MCNC: 1.64 MG/DL (ref 0.5–1.4)
ERYTHROCYTE [DISTWIDTH] IN BLOOD BY AUTOMATED COUNT: 49.6 FL (ref 35.9–50)
FLUAV RNA SPEC QL NAA+PROBE: NEGATIVE
FLUBV RNA SPEC QL NAA+PROBE: NEGATIVE
GFR SERPLBLD CREATININE-BSD FMLA CKD-EPI: 34 ML/MIN/1.73 M 2
GLUCOSE BLD STRIP.AUTO-MCNC: 217 MG/DL (ref 65–99)
GLUCOSE BLD STRIP.AUTO-MCNC: 220 MG/DL (ref 65–99)
GLUCOSE BLD STRIP.AUTO-MCNC: 220 MG/DL (ref 65–99)
GLUCOSE SERPL-MCNC: 257 MG/DL (ref 65–99)
HCT VFR BLD AUTO: 35.7 % (ref 37–47)
HGB BLD-MCNC: 11.7 G/DL (ref 12–16)
MCH RBC QN AUTO: 29.3 PG (ref 27–33)
MCHC RBC AUTO-ENTMCNC: 32.8 G/DL (ref 32.2–35.5)
MCV RBC AUTO: 89.5 FL (ref 81.4–97.8)
PLATELET # BLD AUTO: 178 K/UL (ref 164–446)
PMV BLD AUTO: 10.7 FL (ref 9–12.9)
POTASSIUM SERPL-SCNC: 4.8 MMOL/L (ref 3.6–5.5)
RBC # BLD AUTO: 3.99 M/UL (ref 4.2–5.4)
RSV RNA SPEC QL NAA+PROBE: NEGATIVE
SARS-COV-2 RNA RESP QL NAA+PROBE: NOTDETECTED
SODIUM SERPL-SCNC: 140 MMOL/L (ref 135–145)
SPECIMEN SOURCE: NORMAL
WBC # BLD AUTO: 7.5 K/UL (ref 4.8–10.8)

## 2023-11-29 PROCEDURE — 700111 HCHG RX REV CODE 636 W/ 250 OVERRIDE (IP): Performed by: RADIOLOGY

## 2023-11-29 PROCEDURE — 99239 HOSP IP/OBS DSCHRG MGMT >30: CPT | Performed by: HOSPITALIST

## 2023-11-29 PROCEDURE — 82962 GLUCOSE BLOOD TEST: CPT

## 2023-11-29 PROCEDURE — 0D20XUZ CHANGE FEEDING DEVICE IN UPPER INTESTINAL TRACT, EXTERNAL APPROACH: ICD-10-PCS | Performed by: RADIOLOGY

## 2023-11-29 PROCEDURE — C9803 HOPD COVID-19 SPEC COLLECT: HCPCS

## 2023-11-29 PROCEDURE — 49450 REPLACE G/C TUBE PERC: CPT

## 2023-11-29 PROCEDURE — 0241U HCHG SARS-COV-2 COVID-19 NFCT DS RESP RNA 4 TRGT MIC: CPT

## 2023-11-29 PROCEDURE — 80048 BASIC METABOLIC PNL TOTAL CA: CPT

## 2023-11-29 PROCEDURE — 700111 HCHG RX REV CODE 636 W/ 250 OVERRIDE (IP)

## 2023-11-29 PROCEDURE — 700111 HCHG RX REV CODE 636 W/ 250 OVERRIDE (IP): Performed by: STUDENT IN AN ORGANIZED HEALTH CARE EDUCATION/TRAINING PROGRAM

## 2023-11-29 PROCEDURE — 85027 COMPLETE CBC AUTOMATED: CPT

## 2023-11-29 PROCEDURE — 700111 HCHG RX REV CODE 636 W/ 250 OVERRIDE (IP): Mod: JZ

## 2023-11-29 RX ORDER — SODIUM CHLORIDE 9 MG/ML
500 INJECTION, SOLUTION INTRAVENOUS
Status: DISCONTINUED | OUTPATIENT
Start: 2023-11-29 | End: 2023-11-29 | Stop reason: HOSPADM

## 2023-11-29 RX ORDER — MORPHINE SULFATE 4 MG/ML
2 INJECTION INTRAVENOUS EVERY 4 HOURS PRN
Status: DISCONTINUED | OUTPATIENT
Start: 2023-11-29 | End: 2023-11-29 | Stop reason: HOSPADM

## 2023-11-29 RX ORDER — LABETALOL HYDROCHLORIDE 5 MG/ML
10 INJECTION, SOLUTION INTRAVENOUS EVERY 4 HOURS PRN
Status: DISCONTINUED | OUTPATIENT
Start: 2023-11-29 | End: 2023-11-29 | Stop reason: HOSPADM

## 2023-11-29 RX ORDER — ONDANSETRON 2 MG/ML
4 INJECTION INTRAMUSCULAR; INTRAVENOUS PRN
Status: DISCONTINUED | OUTPATIENT
Start: 2023-11-29 | End: 2023-11-29 | Stop reason: HOSPADM

## 2023-11-29 RX ORDER — ONDANSETRON 4 MG/1
4 TABLET, ORALLY DISINTEGRATING ORAL EVERY 6 HOURS PRN
Qty: 10 TABLET | Refills: 0 | Status: SHIPPED | OUTPATIENT
Start: 2023-11-29

## 2023-11-29 RX ORDER — MIDAZOLAM HYDROCHLORIDE 1 MG/ML
INJECTION INTRAMUSCULAR; INTRAVENOUS
Status: COMPLETED
Start: 2023-11-29 | End: 2023-11-29

## 2023-11-29 RX ORDER — MIDAZOLAM HYDROCHLORIDE 1 MG/ML
.5-2 INJECTION INTRAMUSCULAR; INTRAVENOUS PRN
Status: DISCONTINUED | OUTPATIENT
Start: 2023-11-29 | End: 2023-11-29 | Stop reason: HOSPADM

## 2023-11-29 RX ADMIN — FENTANYL CITRATE 25 MCG: 50 INJECTION, SOLUTION INTRAMUSCULAR; INTRAVENOUS at 10:15

## 2023-11-29 RX ADMIN — FENTANYL CITRATE 50 MCG: 50 INJECTION, SOLUTION INTRAMUSCULAR; INTRAVENOUS at 10:10

## 2023-11-29 RX ADMIN — LABETALOL HYDROCHLORIDE 10 MG: 5 INJECTION, SOLUTION INTRAVENOUS at 07:34

## 2023-11-29 RX ADMIN — HEPARIN 500 UNITS: 100 SYRINGE at 12:38

## 2023-11-29 RX ADMIN — MIDAZOLAM HYDROCHLORIDE 1 MG: 1 INJECTION, SOLUTION INTRAMUSCULAR; INTRAVENOUS at 10:15

## 2023-11-29 RX ADMIN — HYDRALAZINE HYDROCHLORIDE 10 MG: 20 INJECTION, SOLUTION INTRAMUSCULAR; INTRAVENOUS at 00:16

## 2023-11-29 RX ADMIN — INSULIN HUMAN 2 UNITS: 100 INJECTION, SOLUTION PARENTERAL at 00:07

## 2023-11-29 RX ADMIN — MIDAZOLAM HYDROCHLORIDE 1 MG: 1 INJECTION, SOLUTION INTRAMUSCULAR; INTRAVENOUS at 10:10

## 2023-11-29 RX ADMIN — MIDAZOLAM 1 MG: 1 INJECTION, SOLUTION INTRAMUSCULAR; INTRAVENOUS at 10:10

## 2023-11-29 RX ADMIN — METOCLOPRAMIDE 5 MG: 5 INJECTION, SOLUTION INTRAMUSCULAR; INTRAVENOUS at 00:11

## 2023-11-29 RX ADMIN — HYDRALAZINE HYDROCHLORIDE 10 MG: 20 INJECTION, SOLUTION INTRAMUSCULAR; INTRAVENOUS at 06:15

## 2023-11-29 RX ADMIN — MORPHINE SULFATE 2 MG: 4 INJECTION, SOLUTION INTRAMUSCULAR; INTRAVENOUS at 08:43

## 2023-11-29 ASSESSMENT — PAIN DESCRIPTION - PAIN TYPE
TYPE: CHRONIC PAIN
TYPE: ACUTE PAIN
TYPE: ACUTE PAIN

## 2023-11-29 NOTE — DISCHARGE INSTRUCTIONS
Nausea and Vomiting, Adult  Nausea is feeling that you have an upset stomach and that you are about to vomit. Vomiting is when food in your stomach forcefully comes out of your mouth. Vomiting can make you feel weak. If you vomit, or if you are not able to drink enough fluids, you may not have enough water in your body (get dehydrated). If you do not have enough water in your body, you may:  Feel tired.  Feel thirsty.  Have a dry mouth.  Have cracked lips.  Pee (urinate) less often.  Older adults and people with other diseases or a weak body defense system (immune system) are at higher risk for not having enough water in the body. If you feel like you may vomit or you vomit, it is important to follow instructions from your doctor about how to take care of yourself.  Follow these instructions at home:  Watch your symptoms for any changes. Tell your doctor about them.  Eating and drinking         Take an ORS (oral rehydration solution). This is a drink that is sold at pharmacies and stores.  Drink clear fluids in small amounts as you are able, such as:  Water.  Ice chips.  Fruit juice that has water added (diluted fruit juice).  Low-calorie sports drinks.  Eat bland, easy-to-digest foods in small amounts as you are able, such as:  Bananas.  Applesauce.  Rice.  Low-fat (lean) meats.  Toast.  Crackers.  Avoid drinking fluids that have a lot of sugar or caffeine in them. This includes energy drinks, sports drinks, and soda.  Avoid alcohol.  Avoid spicy or fatty foods.  General instructions  Take over-the-counter and prescription medicines only as told by your doctor.  Drink enough fluid to keep your pee (urine) pale yellow.  Wash your hands often with soap and water for at least 20 seconds. If you cannot use soap and water, use hand .  Make sure that everyone in your home washes their hands well and often.  Rest at home until you feel better.  Watch your condition for any changes.  Take slow and deep breaths  when you feel like you may vomit.  Keep all follow-up visits.  Contact a doctor if:  Your symptoms get worse.  You have new symptoms.  You have a fever.  You cannot drink fluids without vomiting.  You feel like you may vomit for more than 2 days.  You feel light-headed or dizzy.  You have a headache.  You have muscle cramps.  You have a rash.  You have pain while peeing.  Get help right away if:  You have pain in your chest, neck, arm, or jaw.  You feel very weak or you faint.  You vomit again and again.  You have vomit that is bright red or looks like black coffee grounds.  You have bloody or black poop (stools) or poop that looks like tar.  You have a very bad headache, a stiff neck, or both.  You have very bad pain, cramping, or bloating in your belly (abdomen).  You have trouble breathing.  You are breathing very quickly.  Your heart is beating very quickly.  Your skin feels cold and clammy.  You feel confused.  You have signs of losing too much water in your body, such as:  Dark pee, very little pee, or no pee.  Cracked lips.  Dry mouth.  Sunken eyes.  Sleepiness.  Weakness.  These symptoms may be an emergency. Get help right away. Call 911.  Do not wait to see if the symptoms will go away.  Do not drive yourself to the hospital.  Summary  Nausea is feeling that you have an upset stomach and that you are about to vomit. Vomiting is when food in your stomach comes out of your mouth.  Follow instructions from your doctor about eating and drinking.  Take over-the-counter and prescription medicines only as told by your doctor.  Contact your doctor if your symptoms get worse or you have new symptoms.  Keep all follow-up visits.  This information is not intended to replace advice given to you by your health care provider. Make sure you discuss any questions you have with your health care provider.  Document Revised: 06/24/2022 Document Reviewed: 06/24/2022  Elsevier Patient Education © 2023 [employer]

## 2023-11-29 NOTE — PROGRESS NOTES
Monitor summary:  Sinus rhythm with a 1st degree block  Rate 59-84  Rare PVCs and couplets  0.23/0.14/0.42

## 2023-11-29 NOTE — DISCHARGE SUMMARY
Discharge Summary    CHIEF COMPLAINT ON ADMISSION  No chief complaint on file.      Reason for Admission  NSTEMI     Admission Date  11/28/2023    CODE STATUS  DNAR/DNI    HPI & HOSPITAL COURSE    Asmita Willis is a 68 y.o. female with history of diabetes, gastroparesis, hypertension, seizure disorder, dysphagia s/p g-tube, chronic hypoxic respiratory failure dependent on nocturnal 2-3L. CKD III who presented 11/28/2023 as a direct transfer from [place] for evaluation of elevated troponin.      Patient initially presented to [facility] ER with intractable nausea vomiting, started 1 hour prior to ER arrival.  She denies chest pain, shortness of breath. Prior to her symptoms she had just recently restarted PO intake and since then has had epigastric pain and nausea. Work-up from [facility] ER notable for WBC 10.3, hemoglobin 15.1, glucose 323, creatinine 1.6, bicarb 23, lactic acid 2.6 >> 2.2, Troponin I 0.06>>0.08. She was given aspirin 324 mg p.o., famotidine 20 mg IV, Zofran 4 mg IV x2, morphine 4 mg IV. Patient was ultimately transferred to [facility] for abnormal troponin results.      Patient was admitted directly to [facility] to medicine service for further evaluation and treatment. Echocardiogram with LVEF 65% and normal regional wall motion, no significant valvular abnormalities. Pharmacologic nuclear stress test revealed no evidence of significant jeopardized viable myocardium or prior myocardial infarction. Normal left ventricular size, ejection fraction, and wall motion. Negative stress ECG for ischemia. She also had a cough, but on room air and afebrile. Flu/Covid swab negative.    Suspect troponin elevation due to TAO on CKD due to volume depletion. Renal ultrasound without evidence of obstruction, did note bilateral borderline atrophy. Treated with IV fluids with subsequent significant improvement in creatinine from 2.35 > 1.64. On arrival to [facility] her gastrostomy tube was incidentally found to be clogged  and was thus successfully exchanged for a new 18 Fr tube by Dr. Carvajal in IR.     On 11/29, patient states she is feeling well and ready to go home, tolerating fluids well without nausea. She will be discharged home to follow up with her regular PCP. Provided her with prescription for Zofran ODT, and educated on return to ED parameters.    Therefore, she is discharged in good and stable condition to home with close outpatient follow-up.    The patient met 2-midnight criteria for an inpatient stay at the time of discharge.    Discharge Date  11/29    FOLLOW UP ITEMS POST DISCHARGE      DISCHARGE DIAGNOSES  Principal Problem (Resolved):    Elevated troponin (POA: Yes)  Active Problems:    DM (diabetes mellitus) (HCC) (POA: Yes)    Dysphagia (POA: Unknown)    Chronic respiratory failure with hypoxia (HCC) (POA: Unknown)    Mood disorder (HCC) (POA: Unknown)  Resolved Problems:    Acute kidney injury superimposed on CKD (HCC) (POA: Yes)    Intractable nausea and vomiting (POA: Unknown)    TAO (acute kidney injury) (HCC) (POA: Yes)      FOLLOW UP  No future appointments.  No follow-up provider specified.    MEDICATIONS ON DISCHARGE     Medication List        START taking these medications        Instructions   ondansetron 4 MG Tbdp  Commonly known as: Zofran ODT   Take 1 Tablet by mouth every 6 hours as needed for Nausea/Vomiting.  Dose: 4 mg            CONTINUE taking these medications        Instructions   atorvastatin 10 MG Tabs  Commonly known as: Lipitor   Take 10 mg by mouth every day.  Dose: 10 mg     busPIRone 10 MG Tabs tablet  Commonly known as: Buspar   Take 10 mg by mouth 2 times a day.  Dose: 10 mg     gabapentin 300 MG Caps  Commonly known as: Neurontin   Take 300 mg by mouth 2 times a day.  Dose: 300 mg     HYDROcodone-acetaminophen 5-325 MG Tabs per tablet  Commonly known as: Norco   Take 1 Tablet by mouth 3 times a day as needed (pain).  Dose: 1 Tablet     insulin 70/30 (70-30) 100 UNIT/ML Susp  Commonly  known as: HumuLIN 70/30/NovoLIN 70/30   Inject 5 Units under the skin at bedtime.  Dose: 5 Units     insulin  UNIT/ML Susp  Commonly known as: HumuLIN/NovoLIN N   Inject 10 Units under the skin 2 times a day.  Dose: 10 Units     insulin regular human  Commonly known as: HumuLIN/NovoLIN R   Inject 3-5 Units under the skin 3 times a day with meals.  Dose: 3-5 Units     metoclopramide 10 MG Tabs  Commonly known as: Reglan   Take 5 mg by mouth 2 times a day.  Dose: 5 mg     metoprolol tartrate 25 MG Tabs  Commonly known as: Lopressor   Take 25 mg by mouth at bedtime.  Dose: 25 mg     mirtazapine 15 MG Tabs  Commonly known as: Remeron   Take 45 mg by mouth every evening.  Dose: 45 mg     pantoprazole 40 MG Tbec  Commonly known as: Protonix   Take 40 mg by mouth 2 times a day.  Dose: 40 mg     QUEtiapine 200 MG Tabs  Commonly known as: SEROquel   Take 200 mg by mouth at bedtime.  Dose: 200 mg     sucralfate 1 GM/10ML Susp  Commonly known as: Carafate   Take 1 g by mouth 3 times a day with meals.  Dose: 1 g     ursodiol 300 MG Caps  Commonly known as: Actigall   Take 500 mg by mouth 3 times a day with meals.  Dose: 500 mg              Allergies  Allergies   Allergen Reactions    Lidocaine Anaphylaxis    Novocain Anaphylaxis       DIET  Orders Placed This Encounter   Procedures    Diet Order Diet: Level 4 - Pureed; Liquid level: Level 0 - Thin     Standing Status:   Standing     Number of Occurrences:   1     Order Specific Question:   Diet:     Answer:   Level 4 - Pureed [25]     Order Specific Question:   Liquid level     Answer:   Level 0 - Thin       ACTIVITY  As tolerated.  Weight bearing as tolerated    CONSULTATIONS      PROCEDURES  Peg tube replacement    LABORATORY  Lab Results   Component Value Date    SODIUM 140 11/29/2023    POTASSIUM 4.8 11/29/2023    CHLORIDE 106 11/29/2023    CO2 22 11/29/2023    GLUCOSE 257 (H) 11/29/2023    BUN 35 (H) 11/29/2023    CREATININE 1.64 (H) 11/29/2023        Lab Results    Component Value Date    WBC 7.5 11/29/2023    HEMOGLOBIN 11.7 (L) 11/29/2023    HEMATOCRIT 35.7 (L) 11/29/2023    PLATELETCT 178 11/29/2023        Total time of the discharge process exceeds 39  minutes.

## 2023-11-29 NOTE — OR SURGEON
Immediate Post- Operative Note        Findings: Gastrostomy tube dysfunction.       Procedure(s): Gastrostomy tube exchange.       Estimated Blood Loss: Less than 5 ml        Complications: None            11/29/2023     1021 AM     Juan Carvajal M.D.

## 2023-11-29 NOTE — PROGRESS NOTES
Pt presents to IR3. Pt was consented by MD at bedside, confirmed by this RN and consent at bedside. Pt transferred to IR table in supine position. Patient underwent a gastric tube exchange by Dr. Carvajal. Procedure site was marked by MD and verified using imaging guidance. Pt placed on monitor, prepped and draped in a sterile fashion. Vitals were taken every 5 minutes and remained stable during procedure (see doc flow sheet for results). CO2 waveform capnography was monitored and remained WNL throughout procedure. Report called to kristina Hurtado RN. Pt transported by stretcher with RN to T216.       CHAPO GERMAN  Bolus Gastrostomy Feeding Tube  18Fr   REF: [id number]  LOT: [id number]  EXP: 05/15/2026

## 2023-11-29 NOTE — DISCHARGE PLANNING
Anticipate no needs @ present time. From [place] [facility] [place] [facility]. Has daughter as support. Has Medicare insurance. PEG tube replaced by IR.

## 2023-11-29 NOTE — PROGRESS NOTES
Pt is A&Ox4, uses the call light appropriately. Line assisting with ambulation.Pt cough more moist non productive sounding this morning, pt more malaise notified On Call provider. Pt needed PRN Hydralazine order twice. NPO since midnight. Please see pt chart for assessment and MAR. Report given to day shift Genesis AGUILERA.

## 2023-11-29 NOTE — PROGRESS NOTES
Report received. Assumed care. Pt AAOx4. Focused Assessment complete. VSS. Denies pain, No other complaints reported;NOC RN endorsed peg tube would not flush;Pt also reported tube has been giving her problems at home; NP, Lu updated Pt was update for the care for the day. White board updated, All question answered. Pt has call light within reach, bed is in the lowest position. Pt has no other needs at this time.

## 2023-11-29 NOTE — PROGRESS NOTES
Report received from night shift RN. Patient A&O, in bed resting comfortably. VSS, bed in lowest position and locked, call light in reach.

## (undated) DEVICE — ELECTRODE DUAL RETURN W/ CORD - (50/PK)

## (undated) DEVICE — SYRINGE DISP. 60 CC LL - (30/BX, 12BX/CA)**WHEN THESE ARE GONE ORDER #500206**

## (undated) DEVICE — TUBE CONNECTING SUCTION - CLEAR PLASTIC STERILE 72 IN (50EA/CA)

## (undated) DEVICE — SYRINGE SAFETY 3 ML 18 GA X 1 1/2 BLUNT LL (100/BX 8BX/CA)

## (undated) DEVICE — SET EXTENSION WITH 2 PORTS (48EA/CA) ***PART #2C8610 IS A SUBSTITUTE*****

## (undated) DEVICE — PAD PREP 24 X 48 CUFFED - (100/CA)

## (undated) DEVICE — BLOCK BITE ENDOSCOPIC 2809 - (100/BX) INTERMEDIATE

## (undated) DEVICE — WATER IRRIGATION STERILE 1000ML (12EA/CA)

## (undated) DEVICE — SYRINGE 12 CC LUER TIP - (80/BX) OBSOLETE ITEM

## (undated) DEVICE — GOWN SURGEONS LARGE - (32/CA)

## (undated) DEVICE — KIT  I.V. START (100EA/CA)

## (undated) DEVICE — CANISTER SUCTION RIGID RED 1500CC (40EA/CA)

## (undated) DEVICE — ELECTRODE 850 FOAM ADHESIVE - HYDROGEL RADIOTRNSPRNT (50/PK)

## (undated) DEVICE — MASK WITH FACE SHIELD (25/BX 4BX/CA)

## (undated) DEVICE — GLOVE, LITE (PAIR)

## (undated) DEVICE — TUBING CLEARLINK DUO-VENT - C-FLO (48EA/CA)

## (undated) DEVICE — FORCEP RADIAL JAW 4 STANDARD CAPACITY W/NEEDLE 240CM (40EA/BX)

## (undated) DEVICE — SYRINGE SAFETY 10 ML 18 GA X 1 1/2 BLUNT LL (100/BX 4BX/CA)

## (undated) DEVICE — TUBE SUCTION YANKAUER  1/4 X 6FT (20EA/CA)"

## (undated) DEVICE — SYRINGE SAFETY 5 ML 18 GA X 1-1/2 BLUNT LL (100/BX 4BX/CA)

## (undated) DEVICE — CANNULA O2 COMFORT SOFT EAR ADULT 7 FT TUBING (50/CA)

## (undated) DEVICE — KIT CUSTOM PROCEDURE SINGLE FOR ENDO  (15/CA)

## (undated) DEVICE — NEPTUNE 4 PORT MANIFOLD - (20/PK)

## (undated) DEVICE — GOWN WARMING STANDARD FLEX - (30/CA)

## (undated) DEVICE — SPONGE GAUZE NON-STERILE 4X4 - (2000/CA 10PK/CA)

## (undated) DEVICE — SENSOR SPO2 ADULT LNCS ADTX (20/BX) ORDER ITEM #19593

## (undated) DEVICE — LACTATED RINGERS INJ 1000 ML - (14EA/CA 60CA/PF)